# Patient Record
Sex: FEMALE | Race: WHITE | NOT HISPANIC OR LATINO | Employment: FULL TIME | URBAN - METROPOLITAN AREA
[De-identification: names, ages, dates, MRNs, and addresses within clinical notes are randomized per-mention and may not be internally consistent; named-entity substitution may affect disease eponyms.]

---

## 2023-02-10 ENCOUNTER — HOSPITAL ENCOUNTER (OUTPATIENT)
Dept: RADIOLOGY | Facility: CLINIC | Age: 63
Discharge: HOME | End: 2023-02-10
Attending: FAMILY MEDICINE
Payer: MEDICARE

## 2023-02-10 ENCOUNTER — OFFICE VISIT (OUTPATIENT)
Dept: FAMILY MEDICINE | Facility: CLINIC | Age: 63
End: 2023-02-10
Payer: MEDICARE

## 2023-02-10 VITALS
OXYGEN SATURATION: 97 % | HEART RATE: 92 BPM | HEIGHT: 66 IN | BODY MASS INDEX: 30.16 KG/M2 | WEIGHT: 187.63 LBS | SYSTOLIC BLOOD PRESSURE: 128 MMHG | DIASTOLIC BLOOD PRESSURE: 80 MMHG | TEMPERATURE: 98 F

## 2023-02-10 DIAGNOSIS — M79.89 MASS OF SOFT TISSUE: ICD-10-CM

## 2023-02-10 DIAGNOSIS — M62.838 MUSCLE SPASMS OF NECK: ICD-10-CM

## 2023-02-10 DIAGNOSIS — M79.89 MASS OF SOFT TISSUE: Primary | ICD-10-CM

## 2023-02-10 PROCEDURE — 99213 OFFICE O/P EST LOW 20 MIN: CPT | Mod: ,,, | Performed by: FAMILY MEDICINE

## 2023-02-10 PROCEDURE — 3074F SYST BP LT 130 MM HG: CPT | Mod: CPTII,,, | Performed by: FAMILY MEDICINE

## 2023-02-10 PROCEDURE — 3008F BODY MASS INDEX DOCD: CPT | Mod: CPTII,,, | Performed by: FAMILY MEDICINE

## 2023-02-10 PROCEDURE — 3008F PR BODY MASS INDEX (BMI) DOCUMENTED: ICD-10-PCS | Mod: CPTII,,, | Performed by: FAMILY MEDICINE

## 2023-02-10 PROCEDURE — 3079F DIAST BP 80-89 MM HG: CPT | Mod: CPTII,,, | Performed by: FAMILY MEDICINE

## 2023-02-10 PROCEDURE — 1159F MED LIST DOCD IN RCRD: CPT | Mod: CPTII,,, | Performed by: FAMILY MEDICINE

## 2023-02-10 PROCEDURE — 99213 PR OFFICE/OUTPT VISIT, EST, LEVL III, 20-29 MIN: ICD-10-PCS | Mod: ,,, | Performed by: FAMILY MEDICINE

## 2023-02-10 PROCEDURE — 3074F PR MOST RECENT SYSTOLIC BLOOD PRESSURE < 130 MM HG: ICD-10-PCS | Mod: CPTII,,, | Performed by: FAMILY MEDICINE

## 2023-02-10 PROCEDURE — 73140 XR FINGER 2 OR MORE VIEWS: ICD-10-PCS | Mod: 26,,, | Performed by: RADIOLOGY

## 2023-02-10 PROCEDURE — 73140 X-RAY EXAM OF FINGER(S): CPT | Mod: TC,LT,, | Performed by: FAMILY MEDICINE

## 2023-02-10 PROCEDURE — 73140 X-RAY EXAM OF FINGER(S): CPT | Mod: 26,,, | Performed by: RADIOLOGY

## 2023-02-10 PROCEDURE — 1159F PR MEDICATION LIST DOCUMENTED IN MEDICAL RECORD: ICD-10-PCS | Mod: CPTII,,, | Performed by: FAMILY MEDICINE

## 2023-02-10 PROCEDURE — 3079F PR MOST RECENT DIASTOLIC BLOOD PRESSURE 80-89 MM HG: ICD-10-PCS | Mod: CPTII,,, | Performed by: FAMILY MEDICINE

## 2023-02-10 PROCEDURE — 73140 XR FINGER 2 OR MORE VIEWS: ICD-10-PCS | Mod: TC,LT,, | Performed by: FAMILY MEDICINE

## 2023-02-10 RX ORDER — CYCLOBENZAPRINE HCL 10 MG
10 TABLET ORAL 3 TIMES DAILY PRN
Qty: 45 TABLET | Refills: 2 | Status: SHIPPED | OUTPATIENT
Start: 2023-02-10

## 2023-02-10 NOTE — PROGRESS NOTES
Subjective:       Patient ID: Regina Brown is a 63 y.o. female.    Chief Complaint: Establish Care (PT HERE TO EST CARE pt is fasting/C/o neck pain x years, would like to try flexeril)    Ms Brown is here today to establish care and be evaluated for a large cyst on her left index finger in between the proximal and the distal interphalangeal joint. She states it has been present since Ana, and has been growing ever since. She states that when shse first noticed it, it was like a little wart. She did go a dermatologist after it had been present for approx 5 years. She states that the dermatologist injected it with steroids and billed her for cauterizing it, although it was not cauterized per patient. It has continued to grow since that time.      Review of Systems   Constitutional: Negative.    HENT: Negative.     Eyes: Negative.    Respiratory: Negative.     Cardiovascular: Negative.    Gastrointestinal: Negative.    Endocrine: Negative.    Musculoskeletal:  Positive for arthralgias, back pain, myalgias, neck pain and neck stiffness.        Mass to left index finger, appears cystic      There is no problem list on file for this patient.      Objective:      Physical Exam  Constitutional:       Appearance: Normal appearance.   HENT:      Head: Normocephalic and atraumatic.      Mouth/Throat:      Mouth: Mucous membranes are moist.   Eyes:      Extraocular Movements: Extraocular movements intact.      Pupils: Pupils are equal, round, and reactive to light.   Abdominal:      Palpations: Abdomen is soft.   Musculoskeletal:      Cervical back: Tenderness present.      Comments: Left index finger with large cystic mass between the PIP and DIP joint   Skin:     General: Skin is warm.   Neurological:      General: No focal deficit present.      Mental Status: She is alert and oriented to person, place, and time.   Psychiatric:         Mood and Affect: Mood normal.         Thought Content: Thought content normal.        "  Judgment: Judgment normal.       Lab Results   Component Value Date     03/16/2014    K 4.2 03/16/2014     03/16/2014    CREATININE 0.8 03/16/2014    BUN 18 03/16/2014    CO2 24 03/16/2014     The ASCVD Risk score (Sima RAYMOND, et al., 2019) failed to calculate for the following reasons:    Cannot find a previous HDL lab    Cannot find a previous total cholesterol lab  Visit Vitals  /80 (BP Location: Left arm, Patient Position: Sitting, BP Method: Medium (Manual))   Pulse 92   Temp 98.4 °F (36.9 °C)   Ht 5' 6" (1.676 m)   Wt 85.1 kg (187 lb 9.8 oz)   SpO2 97%   BMI 30.28 kg/m²      Assessment:       1. Mass of soft tissue    2. Muscle spasms of neck        Plan:       1. Mass of soft tissue  -     Ambulatory referral/consult to Orthopedics; Future; Expected date: 02/17/2023  -     X-Ray Finger 2 or More Views; Future; Expected date: 02/10/2023    2. Muscle spasms of neck  -     cyclobenzaprine (FLEXERIL) 10 MG tablet; Take 1 tablet (10 mg total) by mouth 3 (three) times daily as needed for Muscle spasms.  Dispense: 45 tablet; Refill: 2       Follow up in about 3 months (around 5/10/2023), or if symptoms worsen or fail to improve.           "

## 2023-02-19 ENCOUNTER — PATIENT MESSAGE (OUTPATIENT)
Dept: ADMINISTRATIVE | Facility: HOSPITAL | Age: 63
End: 2023-02-19
Payer: MEDICARE

## 2023-02-19 DIAGNOSIS — Z12.11 SCREENING FOR COLON CANCER: ICD-10-CM

## 2023-02-27 DIAGNOSIS — M79.645 FINGER PAIN, LEFT: Primary | ICD-10-CM

## 2023-03-01 ENCOUNTER — OFFICE VISIT (OUTPATIENT)
Dept: ORTHOPEDICS | Facility: CLINIC | Age: 63
End: 2023-03-01
Payer: MEDICARE

## 2023-03-01 ENCOUNTER — HOSPITAL ENCOUNTER (OUTPATIENT)
Dept: RADIOLOGY | Facility: HOSPITAL | Age: 63
Discharge: HOME | End: 2023-03-01
Attending: PHYSICIAN ASSISTANT
Payer: MEDICARE

## 2023-03-01 DIAGNOSIS — M79.89 MASS OF SOFT TISSUE: ICD-10-CM

## 2023-03-01 DIAGNOSIS — M79.645 FINGER PAIN, LEFT: ICD-10-CM

## 2023-03-01 DIAGNOSIS — R22.32 MASS OF FINGER OF LEFT HAND: Primary | ICD-10-CM

## 2023-03-01 PROCEDURE — 73130 XR HAND COMPLETE 3 VIEW LEFT: ICD-10-PCS | Mod: 26,LT,, | Performed by: RADIOLOGY

## 2023-03-01 PROCEDURE — 99999 PR PBB SHADOW E&M-EST. PATIENT-LVL II: ICD-10-PCS | Mod: PBBFAC,,, | Performed by: PHYSICIAN ASSISTANT

## 2023-03-01 PROCEDURE — 99999 PR PBB SHADOW E&M-EST. PATIENT-LVL II: CPT | Mod: PBBFAC,,, | Performed by: PHYSICIAN ASSISTANT

## 2023-03-01 PROCEDURE — 99204 OFFICE O/P NEW MOD 45 MIN: CPT | Mod: S$GLB,,, | Performed by: PHYSICIAN ASSISTANT

## 2023-03-01 PROCEDURE — 73130 X-RAY EXAM OF HAND: CPT | Mod: TC,PN,LT

## 2023-03-01 PROCEDURE — 1159F PR MEDICATION LIST DOCUMENTED IN MEDICAL RECORD: ICD-10-PCS | Mod: CPTII,S$GLB,, | Performed by: PHYSICIAN ASSISTANT

## 2023-03-01 PROCEDURE — 1159F MED LIST DOCD IN RCRD: CPT | Mod: CPTII,S$GLB,, | Performed by: PHYSICIAN ASSISTANT

## 2023-03-01 PROCEDURE — 99204 PR OFFICE/OUTPT VISIT, NEW, LEVL IV, 45-59 MIN: ICD-10-PCS | Mod: S$GLB,,, | Performed by: PHYSICIAN ASSISTANT

## 2023-03-01 PROCEDURE — 73130 X-RAY EXAM OF HAND: CPT | Mod: 26,LT,, | Performed by: RADIOLOGY

## 2023-03-01 NOTE — PROGRESS NOTES
[facility]  History & Physical  Orthopedics    SUBJECTIVE:      Chief Complaint: Left index finger mass    Referring Provider: Colleen Licea Dr. is the supervising physician for this encounter/patient    History of Present Illness:  Patient is a 63 y.o. right hand dominant female who presents today with complaints of left index finger mass, present since 2005. She says it started as what looked like a small wart to the underside of the DIP, this then increased in size over a year and stayed the same. She has noticed some increased size recently. This causes no pain and she has full motion, however she would like it removed.     Onset of symptoms/DOI was 2005.    Symptoms are aggravated by  none .    Symptoms are alleviated by  none .    Symptoms consist of  mass .    The patient rates their pain as a 0/10.    Attempted treatment(s) and/or interventions include activity modifications, rest.     The patient denies any fevers, chills, N/V, D/C and presents for evaluation.       Past Medical History:   Diagnosis Date    HTN (hypertension)      Past Surgical History:   Procedure Laterality Date    APPENDECTOMY      ELBOW SURGERY Right 1998     Review of patient's allergies indicates:   Allergen Reactions    Codeine     Opioids-meperidine and related Rash     Social History     Social History Narrative    Not on file     No family history on file.      Current Outpatient Medications:     cyclobenzaprine (FLEXERIL) 10 MG tablet, Take 1 tablet (10 mg total) by mouth 3 (three) times daily as needed for Muscle spasms., Disp: 45 tablet, Rfl: 2      Review of Systems:  Constitutional: no fever or chills  Eyes: no visual changes  ENT: no nasal congestion or sore throat  Respiratory: no cough or shortness of breath  Cardiovascular: no chest pain  Gastrointestinal: no nausea or vomiting, tolerating diet  Musculoskeletal:  mass    OBJECTIVE:      Vital Signs (Most Recent):  There were no vitals  filed for this visit.  There is no height or weight on file to calculate BMI.      Physical Exam:  Constitutional: The patient appears well-developed and well-nourished. No distress.   Skin: No lesions appreciated  Head: Normocephalic and atraumatic.   Nose: Nose normal.   Ears: No deformities seen  Eyes: Conjunctivae and EOM are normal.   Neck: No tracheal deviation present.   Cardiovascular: Normal rate and intact distal pulses.    Pulmonary/Chest: Effort normal. No respiratory distress.   Abdominal: There is no guarding.   Neurological: The patient is alert.   Psychiatric: The patient has a normal mood and affect.     Left Hand/Wrist Examination:    Observation/Inspection:  Swelling  none    Deformity  Large mass noted to the volar surface of the index middle phalanx, this does appear to wrap around to the radial boarder as well.  Discoloration  none     Scars   none    Atrophy  none    HAND/WRIST EXAMINATION:  Finkelstein's Test   Neg  WHAT Test    Neg  Snuff box tenderness   Neg  Veloz's Test    Neg  Hook of Hamate Tenderness  Neg  CMC grind    Neg  Circumduction test   Neg  The mass is soft and NTTP    Neurovascular Exam:  Digits WWP, brisk CR < 3s throughout  NVI motor/LTS to M/R/U nerves, radial pulse 2+  Tinel's Test - Carpal Tunnel  Neg  Tinel's Test - Cubital Tunnel  Neg  Phalen's Test    Neg  Median Nerve Compression Test Neg    ROM hand full, painless    ROM wrist full, painless    ROM elbow full, painless    Abdomen not guarded  Respirations nonlabored  Perfusion intact    Diagnostic Results:     Imaging - I independently viewed the patient's imaging as well as the radiology report.      FINDINGS:  No acute fracture.  Slight ulnar minus variance.  Mild degenerative change thumb MCP joint and several finger IP joints.  Along the volar aspect of the index digit there is a rounded mass which is 2.1 x 1.4 cm slightly hyperdense to the adjacent soft tissues.  This is centered at the skin surface and there  is no obvious involvement of the subjacent index middle phalanx.     Impression:     Indeterminate soft tissue mass along the 2nd digit.  No clear bone involvement.  Ultrasound or contrasted MRI may add further characterization if indicated.      ASSESSMENT/PLAN:      63 y.o. yo female with Left index finger mass    Plan: The patient and I had a thorough discussion today.  We discussed the working diagnosis as well as several other potential alternative diagnoses.  Treatment options were discussed, both conservative and surgical.  Conservative treatment options would include things such as activity modifications, workplace modifications, a period of rest, oral vs topical OTC and prescription anti-inflammatory medications, occupational therapy, splinting/bracing, immobilization, corticosteroid injections, and others.  Surgical options were discussed as well.     At this time, the patient would like to proceed with a trial of MRI ordered for further evaluation and surgical planning, I will have her see Dr. Richards after to discuss and schedule surgery.    Should the patient's symptoms worsen, persist, or fail to improve they should return for reevaluation and I would be happy to see them back anytime.           Please do not hesitate to reach out to us via email, phone, or MyChart with any questions, concerns, or feedback.

## 2023-03-20 ENCOUNTER — TELEPHONE (OUTPATIENT)
Dept: ORTHOPEDICS | Facility: CLINIC | Age: 63
End: 2023-03-20
Payer: MEDICARE

## 2023-03-20 NOTE — TELEPHONE ENCOUNTER
Called and spoke with the pt. Pt wants to schedule her MRI as she got clearance from her insurance. Informed pt the first availability is on 04/09/23 at 8.30 am at [facility] which is across the street to the [facility] in [place],pt confirmed the available appt. I informed her that she needs to reschedule her f/u appt with  for MRI review. Informed pt the next availability is on 04/20/23 at 9.00 am at [facility].pt confirmed the available appt and I confirmed her appt. Pt verbalized understanding and was thankful.

## 2023-04-09 ENCOUNTER — HOSPITAL ENCOUNTER (OUTPATIENT)
Dept: RADIOLOGY | Facility: HOSPITAL | Age: 63
Discharge: HOME | End: 2023-04-09
Attending: PHYSICIAN ASSISTANT
Payer: MEDICARE

## 2023-04-09 DIAGNOSIS — R22.32 MASS OF FINGER OF LEFT HAND: ICD-10-CM

## 2023-04-09 PROCEDURE — 73220 MRI UPPR EXTREMITY W/O&W/DYE: CPT | Mod: 26,LT,, | Performed by: RADIOLOGY

## 2023-04-09 PROCEDURE — A9585 GADOBUTROL INJECTION: HCPCS | Performed by: PHYSICIAN ASSISTANT

## 2023-04-09 PROCEDURE — 73220 MRI UPPR EXTREMITY W/O&W/DYE: CPT | Mod: TC,LT

## 2023-04-09 PROCEDURE — 25500020 PHARM REV CODE 255: Performed by: PHYSICIAN ASSISTANT

## 2023-04-09 PROCEDURE — 73220 MRI HAND FINGERS W WO CONTRAST LEFT: ICD-10-PCS | Mod: 26,LT,, | Performed by: RADIOLOGY

## 2023-04-09 RX ORDER — GADOBUTROL 604.72 MG/ML
9 INJECTION INTRAVENOUS
Status: COMPLETED | OUTPATIENT
Start: 2023-04-09 | End: 2023-04-09

## 2023-04-09 RX ADMIN — GADOBUTROL 9 ML: 604.72 INJECTION INTRAVENOUS at 08:04

## 2023-04-11 ENCOUNTER — PATIENT MESSAGE (OUTPATIENT)
Dept: ADMINISTRATIVE | Facility: HOSPITAL | Age: 63
End: 2023-04-11
Payer: MEDICARE

## 2023-04-20 ENCOUNTER — OFFICE VISIT (OUTPATIENT)
Dept: ORTHOPEDICS | Facility: CLINIC | Age: 63
End: 2023-04-20
Payer: MEDICARE

## 2023-04-20 VITALS — HEIGHT: 66 IN | WEIGHT: 187 LBS | BODY MASS INDEX: 30.05 KG/M2

## 2023-04-20 DIAGNOSIS — R22.32 FINGER MASS, LEFT: ICD-10-CM

## 2023-04-20 DIAGNOSIS — R22.32 MASS OF FINGER OF LEFT HAND: ICD-10-CM

## 2023-04-20 DIAGNOSIS — M79.89 MASS OF SOFT TISSUE: Primary | ICD-10-CM

## 2023-04-20 PROCEDURE — 3008F BODY MASS INDEX DOCD: CPT | Mod: CPTII,S$GLB,, | Performed by: ORTHOPAEDIC SURGERY

## 2023-04-20 PROCEDURE — 99999 PR PBB SHADOW E&M-EST. PATIENT-LVL III: ICD-10-PCS | Mod: PBBFAC,,, | Performed by: ORTHOPAEDIC SURGERY

## 2023-04-20 PROCEDURE — 1159F PR MEDICATION LIST DOCUMENTED IN MEDICAL RECORD: ICD-10-PCS | Mod: CPTII,S$GLB,, | Performed by: ORTHOPAEDIC SURGERY

## 2023-04-20 PROCEDURE — 3008F PR BODY MASS INDEX (BMI) DOCUMENTED: ICD-10-PCS | Mod: CPTII,S$GLB,, | Performed by: ORTHOPAEDIC SURGERY

## 2023-04-20 PROCEDURE — 1159F MED LIST DOCD IN RCRD: CPT | Mod: CPTII,S$GLB,, | Performed by: ORTHOPAEDIC SURGERY

## 2023-04-20 PROCEDURE — 99214 OFFICE O/P EST MOD 30 MIN: CPT | Mod: S$GLB,,, | Performed by: ORTHOPAEDIC SURGERY

## 2023-04-20 PROCEDURE — 99999 PR PBB SHADOW E&M-EST. PATIENT-LVL III: CPT | Mod: PBBFAC,,, | Performed by: ORTHOPAEDIC SURGERY

## 2023-04-20 PROCEDURE — 99214 PR OFFICE/OUTPT VISIT, EST, LEVL IV, 30-39 MIN: ICD-10-PCS | Mod: S$GLB,,, | Performed by: ORTHOPAEDIC SURGERY

## 2023-04-20 RX ORDER — MUPIROCIN 20 MG/G
OINTMENT TOPICAL
Status: CANCELLED | OUTPATIENT
Start: 2023-04-20

## 2023-04-20 NOTE — H&P
[facility]  History & Physical  Orthopedics     SUBJECTIVE:       Chief Complaint: Left index finger mass     Referring Provider: Russo-Digeorge, Jamie L* Dr. Dunbar is the supervising physician for this encounter/patient     History of Present Illness:  Patient is a 63 y.o. right hand dominant female who presents today with complaints of left index finger mass, present since 2005. She says it started as what looked like a small wart to the underside of the DIP, this then increased in size over a year and stayed the same. She has noticed some increased size recently. This causes no pain and she has full motion, however she would like it removed.      Interval history April 20, 2023: The patient returns today for re-evaluation.  She notes that she has a longstanding history of left index finger mass.  She notes that this was initially needled by her dermatology provider to cauterize it and that she experience some growth subsequently thereafter.  She now notes that this mass has continued enlarging.  She had a recent MRI and returns for these results and re-evaluation.  No other complaints today.     Onset of symptoms/DOI was 2005.     Symptoms are aggravated by  none .     Symptoms are alleviated by  none .     Symptoms consist of  mass .     The patient rates their pain as a 0/10.     Attempted treatment(s) and/or interventions include activity modifications, rest.     The patient denies any fevers, chills, N/V, D/C and presents for evaluation.             Past Medical History:   Diagnosis Date    HTN (hypertension)              Past Surgical History:   Procedure Laterality Date    APPENDECTOMY        ELBOW SURGERY Right 1998           Review of patient's allergies indicates:   Allergen Reactions    Codeine      Opioids-meperidine and related Rash      Social History          Social History Narrative    Not on file      No family history on file.        Current Outpatient Medications:      "cyclobenzaprine (FLEXERIL) 10 MG tablet, Take 1 tablet (10 mg total) by mouth 3 (three) times daily as needed for Muscle spasms., Disp: 45 tablet, Rfl: 2        Review of Systems:  Constitutional: no fever or chills  Eyes: no visual changes  ENT: no nasal congestion or sore throat  Respiratory: no cough or shortness of breath  Cardiovascular: no chest pain  Gastrointestinal: no nausea or vomiting, tolerating diet  Musculoskeletal:  mass     OBJECTIVE:       Vital Signs (Most Recent):  Vitals       Vitals:     04/20/23 0848   Weight: 84.8 kg (187 lb)   Height: 5' 6" (1.676 m)         Body mass index is 30.18 kg/m².        Physical Exam:  Constitutional: The patient appears well-developed and well-nourished. No distress.   Skin: No lesions appreciated  Head: Normocephalic and atraumatic.   Nose: Nose normal.   Ears: No deformities seen  Eyes: Conjunctivae and EOM are normal.   Neck: No tracheal deviation present.   Cardiovascular: Normal rate and intact distal pulses.    Pulmonary/Chest: Effort normal. No respiratory distress.   Abdominal: There is no guarding.   Neurological: The patient is alert.   Psychiatric: The patient has a normal mood and affect.      Left Hand/Wrist Examination:     Observation/Inspection:  Swelling                       none                  Deformity                     Large mass noted to the volar surface of the index middle phalanx, this does appear to wrap around to the radial boarder as well.  Discoloration               none                  Scars                           none                  Atrophy                        none     HAND/WRIST EXAMINATION:  Finkelstein's Test                                Neg  WHAT Test                                         Neg  Snuff box tenderness                          Neg  Veloz's Test                                     Neg  Hook of Hamate Tenderness              Neg  CMC grind                                           Neg  Circumduction " test                              Neg  The mass is soft and NTTP     Neurovascular Exam:  Digits WWP, brisk CR < 3s throughout  NVI motor/LTS to M/R/U nerves, radial pulse 2+  Tinel's Test - Carpal Tunnel                Neg  Tinel's Test - Cubital Tunnel               Neg  Phalen's Test                                      Neg  Median Nerve Compression Test       Neg     ROM hand full, painless     ROM wrist full, painless    ROM elbow full, painless     Abdomen not guarded  Respirations nonlabored  Perfusion intact     Diagnostic Results:     Imaging - I independently viewed the patient's imaging as well as the radiology report.       FINDINGS:  No acute fracture.  Slight ulnar minus variance.  Mild degenerative change thumb MCP joint and several finger IP joints.  Along the volar aspect of the index digit there is a rounded mass which is 2.1 x 1.4 cm slightly hyperdense to the adjacent soft tissues.  This is centered at the skin surface and there is no obvious involvement of the subjacent index middle phalanx.     MRI left index finger-Impression:     1. 1.7 x 1.9 x 1.5 cm well-circumscribed lesion within the subcutaneous soft tissues along the volar and ulnar aspects of the 2nd middle phalanx/DIP joint that demonstrates MR imaging characteristics favoring a mucoid cyst.  No associated osseous remodeling.  Lesion abuts the adjacent 2nd flexor tendon without definite intratendinous extension.     Impression:     Indeterminate soft tissue mass along the 2nd digit.  No clear bone involvement.  Ultrasound or contrasted MRI may add further characterization if indicated.        ASSESSMENT/PLAN:       63 y.o. yo female with Left index finger mass     Plan: The patient and I had a thorough discussion today.  We discussed the working diagnosis as well as several other potential alternative diagnoses.  Treatment options were discussed, both conservative and surgical.  Conservative treatment options would include things such  as activity modifications, workplace modifications, a period of rest, oral vs topical OTC and prescription anti-inflammatory medications, occupational therapy, splinting/bracing, immobilization, corticosteroid injections, and others.  Surgical options were discussed as well.      At this time, the patient would like to proceed with excisional biopsy of her large left index finger mass.  We discussed the significant risk of neurovascular injury given the size of this mass and its location.  She would like to proceed with this on May 25, 2023 which I feel is reasonable.       The patient has not responded to adequate non operative treatment at this time and/or non operative treatment is not indicated. Thus, the risks, benefits and alternatives to surgery were discussed with the patient in detail.  Specific risks include but are not limited to bleeding, infection, vessel and/or nerve damage, pain, numbness, tingling, complex regional pain syndrome, compartment syndrome, failure to return to pre-injury and/or preoperative functional status, scar sensitivity, delayed healing, inability to return to work, pulley injury, tendon injury, bowstringing, partial and/or incomplete relief of symptoms, weakness, persistence of and/or worsening of symptoms, surgical failure, osteomyelitis, amputation, loss of function, stiffness, functional debility, dysfunction, decreased  strength, need for prolonged postoperative rehabilitation, need for further surgery, deep venous thrombosis, pulmonary embolism, arthritis and death.  The patient states an understanding and wishes to proceed with surgery.   All questions were answered.  No guarantees were implied or stated.  Written informed consent was obtained.        Please do not hesitate to reach out to us via email, phone, or [employer] with any questions, concerns, or feedback.

## 2023-04-20 NOTE — PROGRESS NOTES
[facility]  History & Physical  Orthopedics    SUBJECTIVE:      Chief Complaint: Left index finger mass    Referring Provider: Russo-Digeorge, Jamie L* Dr. Dunbar is the supervising physician for this encounter/patient    History of Present Illness:  Patient is a 63 y.o. right hand dominant female who presents today with complaints of left index finger mass, present since 2005. She says it started as what looked like a small wart to the underside of the DIP, this then increased in size over a year and stayed the same. She has noticed some increased size recently. This causes no pain and she has full motion, however she would like it removed.     Interval history April 20, 2023: The patient returns today for re-evaluation.  She notes that she has a longstanding history of left index finger mass.  She notes that this was initially needled by her dermatology provider to cauterize it and that she experience some growth subsequently thereafter.  She now notes that this mass has continued enlarging.  She had a recent MRI and returns for these results and re-evaluation.  No other complaints today.    Onset of symptoms/DOI was 2005.    Symptoms are aggravated by  none .    Symptoms are alleviated by  none .    Symptoms consist of  mass .    The patient rates their pain as a 0/10.    Attempted treatment(s) and/or interventions include activity modifications, rest.     The patient denies any fevers, chills, N/V, D/C and presents for evaluation.       Past Medical History:   Diagnosis Date    HTN (hypertension)      Past Surgical History:   Procedure Laterality Date    APPENDECTOMY      ELBOW SURGERY Right 1998     Review of patient's allergies indicates:   Allergen Reactions    Codeine     Opioids-meperidine and related Rash     Social History     Social History Narrative    Not on file     No family history on file.      Current Outpatient Medications:     cyclobenzaprine (FLEXERIL) 10 MG tablet,  "Take 1 tablet (10 mg total) by mouth 3 (three) times daily as needed for Muscle spasms., Disp: 45 tablet, Rfl: 2      Review of Systems:  Constitutional: no fever or chills  Eyes: no visual changes  ENT: no nasal congestion or sore throat  Respiratory: no cough or shortness of breath  Cardiovascular: no chest pain  Gastrointestinal: no nausea or vomiting, tolerating diet  Musculoskeletal:  mass    OBJECTIVE:      Vital Signs (Most Recent):  Vitals:    04/20/23 0848   Weight: 84.8 kg (187 lb)   Height: 5' 6" (1.676 m)     Body mass index is 30.18 kg/m².      Physical Exam:  Constitutional: The patient appears well-developed and well-nourished. No distress.   Skin: No lesions appreciated  Head: Normocephalic and atraumatic.   Nose: Nose normal.   Ears: No deformities seen  Eyes: Conjunctivae and EOM are normal.   Neck: No tracheal deviation present.   Cardiovascular: Normal rate and intact distal pulses.    Pulmonary/Chest: Effort normal. No respiratory distress.   Abdominal: There is no guarding.   Neurological: The patient is alert.   Psychiatric: The patient has a normal mood and affect.     Left Hand/Wrist Examination:    Observation/Inspection:  Swelling  none    Deformity  Large mass noted to the volar surface of the index middle phalanx, this does appear to wrap around to the radial boarder as well.  Discoloration  none     Scars   none    Atrophy  none    HAND/WRIST EXAMINATION:  Finkelstein's Test   Neg  WHAT Test    Neg  Snuff box tenderness   Neg  Veloz's Test    Neg  Hook of Hamate Tenderness  Neg  CMC grind    Neg  Circumduction test   Neg  The mass is soft and NTTP    Neurovascular Exam:  Digits WWP, brisk CR < 3s throughout  NVI motor/LTS to M/R/U nerves, radial pulse 2+  Tinel's Test - Carpal Tunnel  Neg  Tinel's Test - Cubital Tunnel  Neg  Phalen's Test    Neg  Median Nerve Compression Test Neg    ROM hand full, painless    ROM wrist full, painless    ROM elbow full, painless    Abdomen not " guarded  Respirations nonlabored  Perfusion intact    Diagnostic Results:     Imaging - I independently viewed the patient's imaging as well as the radiology report.      FINDINGS:  No acute fracture.  Slight ulnar minus variance.  Mild degenerative change thumb MCP joint and several finger IP joints.  Along the volar aspect of the index digit there is a rounded mass which is 2.1 x 1.4 cm slightly hyperdense to the adjacent soft tissues.  This is centered at the skin surface and there is no obvious involvement of the subjacent index middle phalanx.     MRI left index finger-Impression:     1. 1.7 x 1.9 x 1.5 cm well-circumscribed lesion within the subcutaneous soft tissues along the volar and ulnar aspects of the 2nd middle phalanx/DIP joint that demonstrates MR imaging characteristics favoring a mucoid cyst.  No associated osseous remodeling.  Lesion abuts the adjacent 2nd flexor tendon without definite intratendinous extension.    Impression:     Indeterminate soft tissue mass along the 2nd digit.  No clear bone involvement.  Ultrasound or contrasted MRI may add further characterization if indicated.      ASSESSMENT/PLAN:      63 y.o. yo female with Left index finger mass    Plan: The patient and I had a thorough discussion today.  We discussed the working diagnosis as well as several other potential alternative diagnoses.  Treatment options were discussed, both conservative and surgical.  Conservative treatment options would include things such as activity modifications, workplace modifications, a period of rest, oral vs topical OTC and prescription anti-inflammatory medications, occupational therapy, splinting/bracing, immobilization, corticosteroid injections, and others.  Surgical options were discussed as well.     At this time, the patient would like to proceed with excisional biopsy of her large left index finger mass.  We discussed the significant risk of neurovascular injury given the size of this mass  and its location.  She would like to proceed with this on May 25, 2023 which I feel is reasonable.      The patient has not responded to adequate non operative treatment at this time and/or non operative treatment is not indicated. Thus, the risks, benefits and alternatives to surgery were discussed with the patient in detail.  Specific risks include but are not limited to bleeding, infection, vessel and/or nerve damage, pain, numbness, tingling, complex regional pain syndrome, compartment syndrome, failure to return to pre-injury and/or preoperative functional status, scar sensitivity, delayed healing, inability to return to work, pulley injury, tendon injury, bowstringing, partial and/or incomplete relief of symptoms, weakness, persistence of and/or worsening of symptoms, surgical failure, osteomyelitis, amputation, loss of function, stiffness, functional debility, dysfunction, decreased  strength, need for prolonged postoperative rehabilitation, need for further surgery, deep venous thrombosis, pulmonary embolism, arthritis and death.  The patient states an understanding and wishes to proceed with surgery.   All questions were answered.  No guarantees were implied or stated.  Written informed consent was obtained.      Please do not hesitate to reach out to us via email, phone, or [email or web address] with any questions, concerns, or feedback.

## 2023-05-09 ENCOUNTER — OFFICE VISIT (OUTPATIENT)
Dept: FAMILY MEDICINE | Facility: CLINIC | Age: 63
End: 2023-05-09
Payer: MEDICARE

## 2023-05-09 ENCOUNTER — LAB VISIT (OUTPATIENT)
Dept: LAB | Facility: CLINIC | Age: 63
End: 2023-05-09
Payer: MEDICARE

## 2023-05-09 ENCOUNTER — HOSPITAL ENCOUNTER (OUTPATIENT)
Dept: RADIOLOGY | Facility: CLINIC | Age: 63
Discharge: HOME | End: 2023-05-09
Attending: FAMILY MEDICINE
Payer: MEDICARE

## 2023-05-09 ENCOUNTER — PATIENT MESSAGE (OUTPATIENT)
Dept: FAMILY MEDICINE | Facility: CLINIC | Age: 63
End: 2023-05-09

## 2023-05-09 VITALS
BODY MASS INDEX: 29.48 KG/M2 | DIASTOLIC BLOOD PRESSURE: 80 MMHG | OXYGEN SATURATION: 95 % | TEMPERATURE: 98 F | HEART RATE: 80 BPM | WEIGHT: 183.44 LBS | SYSTOLIC BLOOD PRESSURE: 124 MMHG | HEIGHT: 66 IN

## 2023-05-09 DIAGNOSIS — Z13.1 DIABETES MELLITUS SCREENING: ICD-10-CM

## 2023-05-09 DIAGNOSIS — R01.1 HEART MURMUR: ICD-10-CM

## 2023-05-09 DIAGNOSIS — Z11.59 ENCOUNTER FOR HEPATITIS C SCREENING TEST FOR LOW RISK PATIENT: ICD-10-CM

## 2023-05-09 DIAGNOSIS — Z13.29 THYROID DISORDER SCREEN: ICD-10-CM

## 2023-05-09 DIAGNOSIS — Z12.31 BREAST CANCER SCREENING BY MAMMOGRAM: ICD-10-CM

## 2023-05-09 DIAGNOSIS — Z71.6 ENCOUNTER FOR TOBACCO USE CESSATION COUNSELING: ICD-10-CM

## 2023-05-09 DIAGNOSIS — Z00.00 ROUTINE MEDICAL EXAM: ICD-10-CM

## 2023-05-09 DIAGNOSIS — Z13.220 LIPID SCREENING: Primary | ICD-10-CM

## 2023-05-09 DIAGNOSIS — Z13.220 LIPID SCREENING: ICD-10-CM

## 2023-05-09 DIAGNOSIS — E78.00 HYPERCHOLESTEREMIA: Primary | ICD-10-CM

## 2023-05-09 LAB
ALBUMIN SERPL BCP-MCNC: 4.4 G/DL (ref 3.5–5.2)
ALBUMIN/CREAT UR: NORMAL UG/MG (ref 0–30)
ALP SERPL-CCNC: 70 U/L (ref 55–135)
ALT SERPL W/O P-5'-P-CCNC: 19 U/L (ref 10–44)
ANION GAP SERPL CALC-SCNC: 13 MMOL/L (ref 8–16)
AST SERPL-CCNC: 17 U/L (ref 10–40)
BASOPHILS # BLD AUTO: 0.05 K/UL (ref 0–0.2)
BASOPHILS NFR BLD: 1 % (ref 0–1.9)
BILIRUB SERPL-MCNC: 0.7 MG/DL (ref 0.1–1)
BUN SERPL-MCNC: 9 MG/DL (ref 8–23)
CALCIUM SERPL-MCNC: 10 MG/DL (ref 8.7–10.5)
CHLORIDE SERPL-SCNC: 108 MMOL/L (ref 95–110)
CHOLEST SERPL-MCNC: 270 MG/DL (ref 120–199)
CHOLEST/HDLC SERPL: 5.5 {RATIO} (ref 2–5)
CO2 SERPL-SCNC: 22 MMOL/L (ref 23–29)
CREAT SERPL-MCNC: 0.8 MG/DL (ref 0.5–1.4)
CREAT UR-MCNC: 33 MG/DL (ref 15–325)
DIFFERENTIAL METHOD: ABNORMAL
EOSINOPHIL # BLD AUTO: 0.1 K/UL (ref 0–0.5)
EOSINOPHIL NFR BLD: 1.2 % (ref 0–8)
ERYTHROCYTE [DISTWIDTH] IN BLOOD BY AUTOMATED COUNT: 12.3 % (ref 11.5–14.5)
EST. GFR  (NO RACE VARIABLE): >60 ML/MIN/1.73 M^2
ESTIMATED AVG GLUCOSE: 97 MG/DL (ref 68–131)
GLUCOSE SERPL-MCNC: 92 MG/DL (ref 70–110)
HBA1C MFR BLD: 5 % (ref 4–5.6)
HCT VFR BLD AUTO: 47.2 % (ref 37–48.5)
HCV AB SERPL QL IA: NORMAL
HDLC SERPL-MCNC: 49 MG/DL (ref 40–75)
HDLC SERPL: 18.1 % (ref 20–50)
HGB BLD-MCNC: 15.2 G/DL (ref 12–16)
IMM GRANULOCYTES # BLD AUTO: 0.01 K/UL (ref 0–0.04)
IMM GRANULOCYTES NFR BLD AUTO: 0.2 % (ref 0–0.5)
LDLC SERPL CALC-MCNC: 192.4 MG/DL (ref 63–159)
LYMPHOCYTES # BLD AUTO: 1.6 K/UL (ref 1–4.8)
LYMPHOCYTES NFR BLD: 30.4 % (ref 18–48)
MCH RBC QN AUTO: 31.1 PG (ref 27–31)
MCHC RBC AUTO-ENTMCNC: 32.2 G/DL (ref 32–36)
MCV RBC AUTO: 97 FL (ref 82–98)
MICROALBUMIN UR DL<=1MG/L-MCNC: <5 UG/ML
MONOCYTES # BLD AUTO: 0.4 K/UL (ref 0.3–1)
MONOCYTES NFR BLD: 6.8 % (ref 4–15)
NEUTROPHILS # BLD AUTO: 3.1 K/UL (ref 1.8–7.7)
NEUTROPHILS NFR BLD: 60.4 % (ref 38–73)
NONHDLC SERPL-MCNC: 221 MG/DL
NRBC BLD-RTO: 0 /100 WBC
PLATELET # BLD AUTO: 196 K/UL (ref 150–450)
PMV BLD AUTO: 11 FL (ref 9.2–12.9)
POTASSIUM SERPL-SCNC: 4.4 MMOL/L (ref 3.5–5.1)
PROT SERPL-MCNC: 7.9 G/DL (ref 6–8.4)
RBC # BLD AUTO: 4.89 M/UL (ref 4–5.4)
SODIUM SERPL-SCNC: 143 MMOL/L (ref 136–145)
TRIGL SERPL-MCNC: 143 MG/DL (ref 30–150)
TSH SERPL DL<=0.005 MIU/L-ACNC: 1.68 UIU/ML (ref 0.4–4)
WBC # BLD AUTO: 5.17 K/UL (ref 3.9–12.7)

## 2023-05-09 PROCEDURE — 3079F PR MOST RECENT DIASTOLIC BLOOD PRESSURE 80-89 MM HG: ICD-10-PCS | Mod: CPTII,,, | Performed by: FAMILY MEDICINE

## 2023-05-09 PROCEDURE — 99214 OFFICE O/P EST MOD 30 MIN: CPT | Mod: ,,, | Performed by: FAMILY MEDICINE

## 2023-05-09 PROCEDURE — 3074F PR MOST RECENT SYSTOLIC BLOOD PRESSURE < 130 MM HG: ICD-10-PCS | Mod: CPTII,,, | Performed by: FAMILY MEDICINE

## 2023-05-09 PROCEDURE — 3079F DIAST BP 80-89 MM HG: CPT | Mod: CPTII,,, | Performed by: FAMILY MEDICINE

## 2023-05-09 PROCEDURE — 71046 X-RAY EXAM CHEST 2 VIEWS: CPT | Mod: TC,,, | Performed by: FAMILY MEDICINE

## 2023-05-09 PROCEDURE — 1159F PR MEDICATION LIST DOCUMENTED IN MEDICAL RECORD: ICD-10-PCS | Mod: CPTII,,, | Performed by: FAMILY MEDICINE

## 2023-05-09 PROCEDURE — 3008F PR BODY MASS INDEX (BMI) DOCUMENTED: ICD-10-PCS | Mod: CPTII,,, | Performed by: FAMILY MEDICINE

## 2023-05-09 PROCEDURE — 71046 X-RAY EXAM CHEST 2 VIEWS: CPT | Mod: 26,,, | Performed by: RADIOLOGY

## 2023-05-09 PROCEDURE — 99214 PR OFFICE/OUTPT VISIT, EST, LEVL IV, 30-39 MIN: ICD-10-PCS | Mod: ,,, | Performed by: FAMILY MEDICINE

## 2023-05-09 PROCEDURE — 85025 COMPLETE CBC W/AUTO DIFF WBC: CPT | Performed by: FAMILY MEDICINE

## 2023-05-09 PROCEDURE — 83036 HEMOGLOBIN GLYCOSYLATED A1C: CPT | Performed by: FAMILY MEDICINE

## 2023-05-09 PROCEDURE — 71046 XR CHEST PA AND LATERAL: ICD-10-PCS | Mod: 26,,, | Performed by: RADIOLOGY

## 2023-05-09 PROCEDURE — 80053 COMPREHEN METABOLIC PANEL: CPT | Performed by: FAMILY MEDICINE

## 2023-05-09 PROCEDURE — 82570 ASSAY OF URINE CREATININE: CPT | Performed by: FAMILY MEDICINE

## 2023-05-09 PROCEDURE — 3008F BODY MASS INDEX DOCD: CPT | Mod: CPTII,,, | Performed by: FAMILY MEDICINE

## 2023-05-09 PROCEDURE — 84443 ASSAY THYROID STIM HORMONE: CPT | Performed by: FAMILY MEDICINE

## 2023-05-09 PROCEDURE — 71046 XR CHEST PA AND LATERAL: ICD-10-PCS | Mod: TC,,, | Performed by: FAMILY MEDICINE

## 2023-05-09 PROCEDURE — 3074F SYST BP LT 130 MM HG: CPT | Mod: CPTII,,, | Performed by: FAMILY MEDICINE

## 2023-05-09 PROCEDURE — 80061 LIPID PANEL: CPT | Performed by: FAMILY MEDICINE

## 2023-05-09 PROCEDURE — 86803 HEPATITIS C AB TEST: CPT | Performed by: FAMILY MEDICINE

## 2023-05-09 PROCEDURE — 1159F MED LIST DOCD IN RCRD: CPT | Mod: CPTII,,, | Performed by: FAMILY MEDICINE

## 2023-05-09 RX ORDER — ROSUVASTATIN CALCIUM 20 MG/1
20 TABLET, COATED ORAL DAILY
Qty: 90 TABLET | Refills: 3 | Status: SHIPPED | OUTPATIENT
Start: 2023-05-09 | End: 2023-08-07 | Stop reason: SDUPTHER

## 2023-05-09 NOTE — PROGRESS NOTES
"Subjective:       Patient ID: Regina Brown is a 63 y.o. female.    Chief Complaint: Follow-up (Pt here for 3 month ck up/Pt is fasting)    Ms Brown is here is here to do a routine follow up. She has not had routine labs in the past 30+ years. She wants a disability parking application filled out for her ortho issues. She walks with a cane and suffers form OA in her hips. She is on disability for this. She also has a mass in the left index finger that will be excised on May 25th, 2023. She is behind all all of her routine care, as she states , "I do not like to be poked and prodded." She is worried about weight gain, as she has gained approx 50# in 5 years. However, she has gone into menopause, has issues with ambiulation, and gets little exercise. We will do routine labs and re-evaluate.    Follow-up  Associated symptoms include arthralgias, fatigue and myalgias. Pertinent negatives include no chest pain, coughing or headaches.   Review of Systems   Constitutional:  Positive for fatigue.   HENT:  Positive for postnasal drip and sneezing.    Respiratory:  Negative for cough and shortness of breath.    Cardiovascular:  Negative for chest pain and palpitations.   Gastrointestinal: Negative.    Genitourinary:  Positive for frequency.   Musculoskeletal:  Positive for arthralgias, back pain, gait problem and myalgias.   Allergic/Immunologic: Positive for environmental allergies.   Neurological:  Negative for dizziness and headaches.   Psychiatric/Behavioral:  Positive for sleep disturbance. The patient is nervous/anxious.      There is no problem list on file for this patient.      Objective:      Physical Exam  Constitutional:       Appearance: Normal appearance. She is obese.   HENT:      Head: Normocephalic and atraumatic.      Mouth/Throat:      Mouth: Mucous membranes are moist.   Eyes:      Pupils: Pupils are equal, round, and reactive to light.   Neck:      Vascular: Carotid bruit present.      Comments: Loud " "right carotid bruit, mild left carotid bruit  Cardiovascular:      Rate and Rhythm: Normal rate and regular rhythm.      Heart sounds: Murmur heard.      Comments: Systolic ejection mumur over the aortic valve..No Patient Care Coordination Note on file.   Refuses workup, saying that she's had it since birth  Pulmonary:      Effort: Pulmonary effort is normal.      Breath sounds: Normal breath sounds.   Musculoskeletal:      Comments: Walks with a limp, uses a cane   Skin:     General: Skin is warm and dry.   Neurological:      Mental Status: She is alert and oriented to person, place, and time. Mental status is at baseline.   Psychiatric:         Mood and Affect: Mood normal.         Behavior: Behavior normal.         Thought Content: Thought content normal.         Judgment: Judgment normal.       Lab Results   Component Value Date     03/16/2014    K 4.2 03/16/2014     03/16/2014    CREATININE 0.8 03/16/2014    BUN 18 03/16/2014    CO2 24 03/16/2014     The ASCVD Risk score (Sima RAYMOND, et al., 2019) failed to calculate for the following reasons:    Cannot find a previous HDL lab    Cannot find a previous total cholesterol lab  Visit Vitals  /80 (BP Location: Right arm, Patient Position: Sitting, BP Method: Medium (Manual))   Pulse 80   Temp 97.8 °F (36.6 °C)   Ht 5' 6" (1.676 m)   Wt 83.2 kg (183 lb 6.8 oz)   SpO2 95%   BMI 29.61 kg/m²      Assessment:       1. Lipid screening    2. Diabetes mellitus screening    3. Thyroid disorder screen    4. Routine medical exam    5. Encounter for hepatitis C screening test for low risk patient    6. Encounter for tobacco use cessation counseling    7. Breast cancer screening by mammogram        Plan:       1. Lipid screening  -     Lipid Panel; Future; Expected date: 05/09/2023    2. Diabetes mellitus screening  -     Hemoglobin A1C; Future; Expected date: 05/09/2023    3. Thyroid disorder screen  -     TSH; Future; Expected date: 05/09/2023    4. Routine " medical exam  -     Lipid Panel; Future; Expected date: 05/09/2023  -     CBC Auto Differential; Future; Expected date: 05/09/2023  -     Comprehensive Metabolic Panel; Future; Expected date: 05/09/2023  -     Hemoglobin A1C; Future; Expected date: 05/09/2023  -     TSH; Future; Expected date: 05/09/2023  -     Microalbumin/Creatinine Ratio, Urine; Future; Expected date: 05/09/2023  -     Cancel: Mammo Digital Screening Bilat w/ Gonzalez; Future; Expected date: 05/09/2023  -     Mammo Digital Screening Bilat w/ Gonzalez; Future; Expected date: 05/09/2023    5. Encounter for hepatitis C screening test for low risk patient  -     Hepatitis C Antibody; Future; Expected date: 05/09/2023    6. Encounter for tobacco use cessation counseling  -     Ambulatory referral/consult to Smoking Cessation Program; Future; Expected date: 05/16/2023    7. Breast cancer screening by mammogram  -     Mammo Digital Screening Bilat w/ Gonzalez; Future; Expected date: 05/09/2023       Follow up in about 6 months (around 11/9/2023), or if symptoms worsen or fail to improve.      Future Appointments       Date Provider Specialty Appt Notes    5/9/2023  Lab labs    6/7/2023 Jamie L. Russo-Digeorge, PA-C Orthopedics 2 wk s/p mass excision

## 2023-05-17 ENCOUNTER — PATIENT MESSAGE (OUTPATIENT)
Dept: ADMINISTRATIVE | Facility: HOSPITAL | Age: 63
End: 2023-05-17
Payer: MEDICARE

## 2023-05-23 RX ORDER — ACETAMINOPHEN 500 MG
1000 TABLET ORAL DAILY PRN
Status: ON HOLD | COMMUNITY
End: 2024-01-23 | Stop reason: HOSPADM

## 2023-05-24 ENCOUNTER — ANESTHESIA EVENT (OUTPATIENT)
Dept: SURGERY | Facility: HOSPITAL | Age: 63
End: 2023-05-24
Payer: MEDICARE

## 2023-05-25 ENCOUNTER — ANESTHESIA (OUTPATIENT)
Dept: SURGERY | Facility: HOSPITAL | Age: 63
End: 2023-05-25
Payer: MEDICARE

## 2023-05-25 ENCOUNTER — HOSPITAL ENCOUNTER (OUTPATIENT)
Facility: HOSPITAL | Age: 63
Discharge: HOME | End: 2023-05-25
Attending: ORTHOPAEDIC SURGERY | Admitting: ORTHOPAEDIC SURGERY
Payer: MEDICARE

## 2023-05-25 ENCOUNTER — PATIENT MESSAGE (OUTPATIENT)
Dept: ADMINISTRATIVE | Facility: HOSPITAL | Age: 63
End: 2023-05-25
Payer: MEDICARE

## 2023-05-25 DIAGNOSIS — R22.32 FINGER MASS, LEFT: ICD-10-CM

## 2023-05-25 DIAGNOSIS — M79.89 MASS OF SOFT TISSUE: ICD-10-CM

## 2023-05-25 DIAGNOSIS — Z01.818 PREOP TESTING: Primary | ICD-10-CM

## 2023-05-25 DIAGNOSIS — R22.32 MASS OF FINGER OF LEFT HAND: ICD-10-CM

## 2023-05-25 DIAGNOSIS — Z01.818 PREOP TESTING: ICD-10-CM

## 2023-05-25 PROCEDURE — 25000003 PHARM REV CODE 250: Performed by: NURSE ANESTHETIST, CERTIFIED REGISTERED

## 2023-05-25 PROCEDURE — 93010 ELECTROCARDIOGRAM REPORT: CPT | Mod: ,,, | Performed by: INTERNAL MEDICINE

## 2023-05-25 PROCEDURE — 99900104 DSU ONLY-NO CHARGE-EA ADD'L HR (STAT): Performed by: ORTHOPAEDIC SURGERY

## 2023-05-25 PROCEDURE — D9220A PRA ANESTHESIA: ICD-10-PCS | Mod: ANES,,, | Performed by: ANESTHESIOLOGY

## 2023-05-25 PROCEDURE — D9220A PRA ANESTHESIA: Mod: CRNA,,, | Performed by: NURSE ANESTHETIST, CERTIFIED REGISTERED

## 2023-05-25 PROCEDURE — 88304 PR  SURG PATH,LEVEL III: ICD-10-PCS | Mod: 26,,, | Performed by: PATHOLOGY

## 2023-05-25 PROCEDURE — 37000009 HC ANESTHESIA EA ADD 15 MINS: Performed by: ORTHOPAEDIC SURGERY

## 2023-05-25 PROCEDURE — 99900103 DSU ONLY-NO CHARGE-INITIAL HR (STAT): Performed by: ORTHOPAEDIC SURGERY

## 2023-05-25 PROCEDURE — 37000008 HC ANESTHESIA 1ST 15 MINUTES: Performed by: ORTHOPAEDIC SURGERY

## 2023-05-25 PROCEDURE — 63600175 PHARM REV CODE 636 W HCPCS: Performed by: NURSE ANESTHETIST, CERTIFIED REGISTERED

## 2023-05-25 PROCEDURE — 71000033 HC RECOVERY, INTIAL HOUR: Performed by: ORTHOPAEDIC SURGERY

## 2023-05-25 PROCEDURE — 36000706: Performed by: ORTHOPAEDIC SURGERY

## 2023-05-25 PROCEDURE — 93005 ELECTROCARDIOGRAM TRACING: CPT

## 2023-05-25 PROCEDURE — 25000003 PHARM REV CODE 250: Performed by: ORTHOPAEDIC SURGERY

## 2023-05-25 PROCEDURE — 36000707: Performed by: ORTHOPAEDIC SURGERY

## 2023-05-25 PROCEDURE — 88304 TISSUE EXAM BY PATHOLOGIST: CPT | Performed by: PATHOLOGY

## 2023-05-25 PROCEDURE — 26111 PR EX TUM/VASC MALF SFT TISS HAND/FNGR SUBQ 1.5+CM: ICD-10-PCS | Mod: F1,,, | Performed by: ORTHOPAEDIC SURGERY

## 2023-05-25 PROCEDURE — D9220A PRA ANESTHESIA: ICD-10-PCS | Mod: CRNA,,, | Performed by: NURSE ANESTHETIST, CERTIFIED REGISTERED

## 2023-05-25 PROCEDURE — 26111 EXC HAND LES SC 1.5 CM/>: CPT | Mod: F1,,, | Performed by: ORTHOPAEDIC SURGERY

## 2023-05-25 PROCEDURE — D9220A PRA ANESTHESIA: Mod: ANES,,, | Performed by: ANESTHESIOLOGY

## 2023-05-25 PROCEDURE — 88304 TISSUE EXAM BY PATHOLOGIST: CPT | Mod: 26,,, | Performed by: PATHOLOGY

## 2023-05-25 PROCEDURE — 94799 UNLISTED PULMONARY SVC/PX: CPT

## 2023-05-25 PROCEDURE — 25000003 PHARM REV CODE 250: Performed by: ANESTHESIOLOGY

## 2023-05-25 PROCEDURE — 71000015 HC POSTOP RECOV 1ST HR: Performed by: ORTHOPAEDIC SURGERY

## 2023-05-25 PROCEDURE — 63600175 PHARM REV CODE 636 W HCPCS: Performed by: ORTHOPAEDIC SURGERY

## 2023-05-25 PROCEDURE — 93010 EKG 12-LEAD: ICD-10-PCS | Mod: ,,, | Performed by: INTERNAL MEDICINE

## 2023-05-25 RX ORDER — OXYCODONE HYDROCHLORIDE 5 MG/1
5 TABLET ORAL ONCE AS NEEDED
Status: DISCONTINUED | OUTPATIENT
Start: 2023-05-25 | End: 2023-05-25 | Stop reason: HOSPADM

## 2023-05-25 RX ORDER — SODIUM CHLORIDE, SODIUM LACTATE, POTASSIUM CHLORIDE, CALCIUM CHLORIDE 600; 310; 30; 20 MG/100ML; MG/100ML; MG/100ML; MG/100ML
500 INJECTION, SOLUTION INTRAVENOUS ONCE
Status: DISCONTINUED | OUTPATIENT
Start: 2023-05-25 | End: 2023-05-25 | Stop reason: HOSPADM

## 2023-05-25 RX ORDER — PROCHLORPERAZINE EDISYLATE 5 MG/ML
5 INJECTION INTRAMUSCULAR; INTRAVENOUS EVERY 30 MIN PRN
Status: DISCONTINUED | OUTPATIENT
Start: 2023-05-25 | End: 2023-05-25 | Stop reason: HOSPADM

## 2023-05-25 RX ORDER — KETOROLAC TROMETHAMINE 30 MG/ML
INJECTION, SOLUTION INTRAMUSCULAR; INTRAVENOUS
Status: DISCONTINUED | OUTPATIENT
Start: 2023-05-25 | End: 2023-05-25

## 2023-05-25 RX ORDER — HYDROMORPHONE HYDROCHLORIDE 2 MG/ML
0.2 INJECTION, SOLUTION INTRAMUSCULAR; INTRAVENOUS; SUBCUTANEOUS EVERY 5 MIN PRN
Status: DISCONTINUED | OUTPATIENT
Start: 2023-05-25 | End: 2023-05-25 | Stop reason: HOSPADM

## 2023-05-25 RX ORDER — CEFAZOLIN SODIUM 2 G/50ML
2 SOLUTION INTRAVENOUS
Status: DISCONTINUED | OUTPATIENT
Start: 2023-05-25 | End: 2023-05-25 | Stop reason: HOSPADM

## 2023-05-25 RX ORDER — HYDROMORPHONE HYDROCHLORIDE 2 MG/ML
0.5 INJECTION, SOLUTION INTRAMUSCULAR; INTRAVENOUS; SUBCUTANEOUS
Status: DISCONTINUED | OUTPATIENT
Start: 2023-05-25 | End: 2023-05-25 | Stop reason: HOSPADM

## 2023-05-25 RX ORDER — ONDANSETRON HYDROCHLORIDE 2 MG/ML
INJECTION, SOLUTION INTRAMUSCULAR; INTRAVENOUS
Status: DISCONTINUED | OUTPATIENT
Start: 2023-05-25 | End: 2023-05-25

## 2023-05-25 RX ORDER — LIDOCAINE HYDROCHLORIDE 10 MG/ML
INJECTION, SOLUTION EPIDURAL; INFILTRATION; INTRACAUDAL; PERINEURAL
Status: DISCONTINUED | OUTPATIENT
Start: 2023-05-25 | End: 2023-05-25 | Stop reason: HOSPADM

## 2023-05-25 RX ORDER — PROPOFOL 10 MG/ML
VIAL (ML) INTRAVENOUS
Status: DISCONTINUED | OUTPATIENT
Start: 2023-05-25 | End: 2023-05-25

## 2023-05-25 RX ORDER — LORAZEPAM 2 MG/ML
0.25 INJECTION INTRAMUSCULAR ONCE AS NEEDED
Status: DISCONTINUED | OUTPATIENT
Start: 2023-05-25 | End: 2023-05-25 | Stop reason: HOSPADM

## 2023-05-25 RX ORDER — MIDAZOLAM HYDROCHLORIDE 1 MG/ML
INJECTION INTRAMUSCULAR; INTRAVENOUS
Status: DISCONTINUED | OUTPATIENT
Start: 2023-05-25 | End: 2023-05-25

## 2023-05-25 RX ORDER — LIDOCAINE HYDROCHLORIDE 10 MG/ML
1 INJECTION, SOLUTION EPIDURAL; INFILTRATION; INTRACAUDAL; PERINEURAL ONCE
Status: DISCONTINUED | OUTPATIENT
Start: 2023-05-25 | End: 2023-05-25 | Stop reason: HOSPADM

## 2023-05-25 RX ORDER — MEPERIDINE HYDROCHLORIDE 50 MG/ML
12.5 INJECTION INTRAMUSCULAR; INTRAVENOUS; SUBCUTANEOUS ONCE AS NEEDED
Status: DISCONTINUED | OUTPATIENT
Start: 2023-05-25 | End: 2023-05-25 | Stop reason: HOSPADM

## 2023-05-25 RX ORDER — TRAMADOL HYDROCHLORIDE 50 MG/1
50 TABLET ORAL EVERY 6 HOURS
Qty: 28 TABLET | Refills: 0 | Status: SHIPPED | OUTPATIENT
Start: 2023-05-25 | End: 2023-08-03

## 2023-05-25 RX ORDER — ONDANSETRON 2 MG/ML
4 INJECTION INTRAMUSCULAR; INTRAVENOUS ONCE AS NEEDED
Status: DISCONTINUED | OUTPATIENT
Start: 2023-05-25 | End: 2023-05-25 | Stop reason: HOSPADM

## 2023-05-25 RX ORDER — ONDANSETRON 4 MG/1
8 TABLET, ORALLY DISINTEGRATING ORAL EVERY 8 HOURS PRN
Status: DISCONTINUED | OUTPATIENT
Start: 2023-05-25 | End: 2023-05-25 | Stop reason: HOSPADM

## 2023-05-25 RX ORDER — DEXAMETHASONE SODIUM PHOSPHATE 4 MG/ML
INJECTION, SOLUTION INTRA-ARTICULAR; INTRALESIONAL; INTRAMUSCULAR; INTRAVENOUS; SOFT TISSUE
Status: DISCONTINUED | OUTPATIENT
Start: 2023-05-25 | End: 2023-05-25

## 2023-05-25 RX ORDER — ACETAMINOPHEN 10 MG/ML
INJECTION, SOLUTION INTRAVENOUS
Status: DISCONTINUED | OUTPATIENT
Start: 2023-05-25 | End: 2023-05-25

## 2023-05-25 RX ORDER — MUPIROCIN 20 MG/G
OINTMENT TOPICAL
Status: DISCONTINUED | OUTPATIENT
Start: 2023-05-25 | End: 2023-05-25 | Stop reason: HOSPADM

## 2023-05-25 RX ORDER — LIDOCAINE HYDROCHLORIDE 20 MG/ML
INJECTION INTRAVENOUS
Status: DISCONTINUED | OUTPATIENT
Start: 2023-05-25 | End: 2023-05-25

## 2023-05-25 RX ORDER — SODIUM CHLORIDE 0.9 % (FLUSH) 0.9 %
3 SYRINGE (ML) INJECTION EVERY 8 HOURS
Status: DISCONTINUED | OUTPATIENT
Start: 2023-05-25 | End: 2023-05-25 | Stop reason: HOSPADM

## 2023-05-25 RX ORDER — DIPHENHYDRAMINE HYDROCHLORIDE 50 MG/ML
12.5 INJECTION INTRAMUSCULAR; INTRAVENOUS ONCE AS NEEDED
Status: DISCONTINUED | OUTPATIENT
Start: 2023-05-25 | End: 2023-05-25 | Stop reason: HOSPADM

## 2023-05-25 RX ORDER — FENTANYL CITRATE 50 UG/ML
INJECTION, SOLUTION INTRAMUSCULAR; INTRAVENOUS
Status: DISCONTINUED | OUTPATIENT
Start: 2023-05-25 | End: 2023-05-25

## 2023-05-25 RX ORDER — SODIUM CHLORIDE, SODIUM LACTATE, POTASSIUM CHLORIDE, CALCIUM CHLORIDE 600; 310; 30; 20 MG/100ML; MG/100ML; MG/100ML; MG/100ML
10 INJECTION, SOLUTION INTRAVENOUS CONTINUOUS
Status: DISCONTINUED | OUTPATIENT
Start: 2023-05-25 | End: 2023-05-25 | Stop reason: HOSPADM

## 2023-05-25 RX ORDER — OXYCODONE HYDROCHLORIDE 10 MG/1
10 TABLET ORAL EVERY 4 HOURS PRN
Status: DISCONTINUED | OUTPATIENT
Start: 2023-05-25 | End: 2023-05-25 | Stop reason: HOSPADM

## 2023-05-25 RX ORDER — FENTANYL CITRATE 50 UG/ML
25 INJECTION, SOLUTION INTRAMUSCULAR; INTRAVENOUS EVERY 5 MIN PRN
Status: DISCONTINUED | OUTPATIENT
Start: 2023-05-25 | End: 2023-05-25 | Stop reason: HOSPADM

## 2023-05-25 RX ADMIN — PROPOFOL 20 MG: 10 INJECTION, EMULSION INTRAVENOUS at 10:05

## 2023-05-25 RX ADMIN — KETOROLAC TROMETHAMINE 15 MG: 30 INJECTION, SOLUTION INTRAMUSCULAR; INTRAVENOUS at 10:05

## 2023-05-25 RX ADMIN — ONDANSETRON 4 MG: 2 INJECTION INTRAMUSCULAR; INTRAVENOUS at 10:05

## 2023-05-25 RX ADMIN — FENTANYL CITRATE 50 MCG: 0.05 INJECTION, SOLUTION INTRAMUSCULAR; INTRAVENOUS at 10:05

## 2023-05-25 RX ADMIN — MIDAZOLAM HYDROCHLORIDE 1 MG: 1 INJECTION, SOLUTION INTRAMUSCULAR; INTRAVENOUS at 10:05

## 2023-05-25 RX ADMIN — ACETAMINOPHEN 1000 MG: 10 INJECTION, SOLUTION INTRAVENOUS at 10:05

## 2023-05-25 RX ADMIN — MUPIROCIN: 20 OINTMENT TOPICAL at 08:05

## 2023-05-25 RX ADMIN — PROPOFOL 30 MG: 10 INJECTION, EMULSION INTRAVENOUS at 10:05

## 2023-05-25 RX ADMIN — SODIUM CHLORIDE, SODIUM GLUCONATE, SODIUM ACETATE, POTASSIUM CHLORIDE AND MAGNESIUM CHLORIDE: 526; 502; 368; 37; 30 INJECTION, SOLUTION INTRAVENOUS at 09:05

## 2023-05-25 RX ADMIN — CEFAZOLIN SODIUM 2 G: 2 SOLUTION INTRAVENOUS at 10:05

## 2023-05-25 RX ADMIN — PROPOFOL 50 MG: 10 INJECTION, EMULSION INTRAVENOUS at 10:05

## 2023-05-25 RX ADMIN — LIDOCAINE HYDROCHLORIDE 20 MG: 20 INJECTION, SOLUTION INTRAVENOUS at 10:05

## 2023-05-25 RX ADMIN — GLYCOPYRROLATE 0.1 MG: 0.2 INJECTION, SOLUTION INTRAMUSCULAR; INTRAVITREAL at 10:05

## 2023-05-25 RX ADMIN — DEXAMETHASONE SODIUM PHOSPHATE 4 MG: 4 INJECTION, SOLUTION INTRA-ARTICULAR; INTRALESIONAL; INTRAMUSCULAR; INTRAVENOUS; SOFT TISSUE at 10:05

## 2023-05-25 NOTE — ANESTHESIA POSTPROCEDURE EVALUATION
Anesthesia Post Evaluation    Patient: Regina Brown    Procedure(s) Performed: Procedure(s) (LRB):  EXCISION, MASS, INDEX FINGER (Left)    Final Anesthesia Type: MAC      Patient location during evaluation: PACU  Patient participation: Yes- Able to Participate  Level of consciousness: awake and alert  Post-procedure vital signs: reviewed and stable  Pain management: adequate  Airway patency: patent    PONV status at discharge: No PONV  Anesthetic complications: no      Cardiovascular status: hemodynamically stable  Respiratory status: unassisted and room air  Hydration status: euvolemic  Follow-up not needed.          Vitals Value Taken Time   /85 05/25/23 1132   Temp 36.6 °C (97.8 °F) 05/25/23 1130   Pulse 87 05/25/23 1132   Resp 20 05/25/23 1132   SpO2 99 % 05/25/23 1132         Event Time   Out of Recovery 11:32:00         Pain/Susan Score: Pain Rating Post Med Admin: 0 (5/25/2023 11:38 AM)  Susan Score: 10 (5/25/2023 11:30 AM)  Modified Susan Score: 20 (5/25/2023 11:38 AM)

## 2023-05-25 NOTE — OP NOTE
DATE OF PROCEDURE:05/25/2023     COVID-19 attestation:  Due to the nature of this patient's condition and/or the presence of pain, debilitty, and/or dysfunction, proceeding with surgery was indicated.  Furthermore, this patient was treated during the COVID-19 pandemic.  This was discussed with the patient, they are aware of our current policies and procedures, were given the option of delaying their surgery when applicable and if acceptable, and they elect to proceed.  Delaying this patient's surgery greater than 30 days would be detrimental to their care and functional outcome and/or cause additional suffering, pain, discomfort, and/or dysfunction/debility.  This was confirmed and we thus proceeded.       SERVICE:  Orthopedic Surgery.     SURGEON:  Migel Richards M.D.     FIRST ASSISTANT:  SMA Jag     PREOPERATIVE DIAGNOSIS:    Left index finger mass     POSTOPERATIVE DIAGNOSIS:    Same     PROCEDURE(S) PERFORMED:    Excisional biopsy left index finger mass, approx 2.5cm x 2cm x 2cm     TOURNIQUET TIME:  23 minutes at 250mmHg.     ESTIMATED BLOOD LOSS:  1 mL.     IMPLANTS:  None     COMPLICATIONS:  None.     PACKS AND DRAINS:  None.     PATHOLOGIC SPECIMENS:  The mass was sent for pathology.     ANESTHESIA:  Local MAC     IV FLUIDS: Crystalloid.     CONDITION:  Stable.     MICROBIOLOGY: None.     Indications for Procedure:      The patient is a 63-year-old male who presented with a significantly symptomatic mass to the left index finger.   The patient has not responded to adequate non operative treatment at this time and/or non operative treatment is not indicated. Thus, the risks, benefits and alternatives to surgery were discussed with the patient in detail.  Specific risks include but are not limited to bleeding, infection, vessel and/or nerve damage, pain, numbness, tingling, compartment syndrome, need for additional surgery, failure to return to pre-injury and/or preoperative functional status, inability to  Please let patient know that I placed an order for COVID testing. Thank you. return to work, scar sensitivity, delayed healing, complex regional pain syndrome, weakness, pulley injury, tendon injury, bowstringing, partial and/or incomplete relief of symptoms, persistence of and/or worsening of symptoms, hardware and/or surgical failure, prominent and/or symptomatic hardware possibly necessitating future removal, osteomyelitis, amputation, loss of function, stiffness, rotational malalignment, functional debility, dysfunction, decreased  strength, need for prolonged postoperative rehabilitation, malunion, nonunion, deep venous thrombosis, pulmonary embolism, arthritis and death.  The patient states an understanding and wishes to proceed with surgery.   All questions were answered.  No guarantees were implied or stated.  Written informed consent was obtained.     Procedure in detail:     On the date of the operative intervention, the patient was evaluated in the preoperative holding area.  With their participation the left upper extremity was marked at the operative site.  I circled the mass with her participation.  SHe was in agreement that this was the mass in question for today's procedure.  The patient was then taken to the operating room where they were placed on OR table.  The left upper extremity extremity was then placed on a hand table with a nonsterile tourniquet placed high on the patient's left upper extremity.  The left upper extremity extremity was then prepped and draped in the usual sterile fashion and time-out was taken and confirmed by all present to confirm the correct patient site procedure and administration of preoperative antibiotics if ordered.  All were in agreement so I proceeded.  1% plain lidocaine was utilized for digital block to the left index finger.  After adequate analgesia, Esmarch was utilized to exsanguinate the left upper extremity tourniquet was insufflated to 250 mm mercury.  An approximately 5cm Brunner incision was made overlying the left index  finger centered at the mass.  Dissection was carried down through skin and subcutaneous tissues.  Full thickness flaps were developed.  Radial and ulnar neurovascular bundles were identified and protected.  Upon entering the subcutaneous tissues, I was able to immediately and readily identify a large, significant mass largely atop the flexor sheath but extending radially and ulnarly with an intimate relationship with the bundles.  This was able to be freed from adjacent tissues and completely removed.  It was then passed off the field for pathology specimen.  At this point in time I then examined the wound there was no evidence of any residual or remaining mass.  The wound was irrigated with copious amounts of sterile saline.  Skin was closed with 4-0 nylon suture soft dressings were applied.  Tourniquet was deflated brisk capillary refill in [place] throughout the entire left upper extremity.  The patient was then awakened from anesthesia return the post anesthesia care in stable condition.  There were no complications as attending surgeon was present performed the critical portions of the procedure.      Postop plan the patient:  The patient will be discharged home in stable condition.  Range of motion as tolerated.  Follow-up in 2 weeks.  Pathology will be followed.     Please be aware that this note has been generated with the assistance of [facility] voice-to-text.  Please excuse any spelling or grammatical errors.

## 2023-05-25 NOTE — PLAN OF CARE
Pt prepped for surgery. Consents at bedside. Incentive spirometry taught by respiratory. Pt's clothing placed in postop cabinet. Glasses and upper dentures given to pt's family. Family set up with text alerts.

## 2023-05-25 NOTE — DISCHARGE SUMMARY
[facility]  Discharge Note  Short Stay    Procedure(s) (LRB):  EXCISION, MASS - left IF large DIP (Left)      OUTCOME: Patient tolerated treatment/procedure well without complication and is now ready for discharge.    DISPOSITION: Home or Self Care    FINAL DIAGNOSIS:  <principal problem not specified>    FOLLOWUP: In clinic    DISCHARGE INSTRUCTIONS:    Discharge Procedure Orders   Diet general     Leave dressing on - Keep it clean, dry, and intact until clinic visit     Call MD for:  temperature >100.4     Call MD for:  persistent nausea and vomiting     Call MD for:  severe uncontrolled pain     Call MD for:  difficulty breathing, headache or visual disturbances     Call MD for:  redness, tenderness, or signs of infection (pain, swelling, redness, odor or green/yellow discharge around incision site)     Call MD for:  hives     Call MD for:  persistent dizziness or light-headedness     Call MD for:  extreme fatigue        TIME SPENT ON DISCHARGE: 2 minutes

## 2023-05-25 NOTE — H&P
[facility]  History & Physical  Orthopedics     SUBJECTIVE:       Chief Complaint: Left index finger mass     Referring Provider: Russo-Digeorge, Jamie L* Dr. Dunbar is the supervising physician for this encounter/patient     History of Present Illness:  Patient is a 63 y.o. right hand dominant female who presents today with complaints of left index finger mass, present since 2005. She says it started as what looked like a small wart to the underside of the DIP, this then increased in size over a year and stayed the same. She has noticed some increased size recently. This causes no pain and she has full motion, however she would like it removed.      Interval history April 20, 2023: The patient returns today for re-evaluation.  She notes that she has a longstanding history of left index finger mass.  She notes that this was initially needled by her dermatology provider to cauterize it and that she experience some growth subsequently thereafter.  She now notes that this mass has continued enlarging.  She had a recent MRI and returns for these results and re-evaluation.  No other complaints today.     Onset of symptoms/DOI was 2005.     Symptoms are aggravated by  none .     Symptoms are alleviated by  none .     Symptoms consist of  mass .     The patient rates their pain as a 0/10.     Attempted treatment(s) and/or interventions include activity modifications, rest.     The patient denies any fevers, chills, N/V, D/C and presents for evaluation.                Past Medical History:   Diagnosis Date    HTN (hypertension)                  Past Surgical History:   Procedure Laterality Date    APPENDECTOMY        ELBOW SURGERY Right 1998              Review of patient's allergies indicates:   Allergen Reactions    Codeine      Opioids-meperidine and related Rash      Social History            Social History Narrative    Not on file      No family history on file.        Current Outpatient  "Medications:     cyclobenzaprine (FLEXERIL) 10 MG tablet, Take 1 tablet (10 mg total) by mouth 3 (three) times daily as needed for Muscle spasms., Disp: 45 tablet, Rfl: 2        Review of Systems:  Constitutional: no fever or chills  Eyes: no visual changes  ENT: no nasal congestion or sore throat  Respiratory: no cough or shortness of breath  Cardiovascular: no chest pain  Gastrointestinal: no nausea or vomiting, tolerating diet  Musculoskeletal:  mass     OBJECTIVE:       Vital Signs (Most Recent):  Vitals         Vitals:     04/20/23 0848   Weight: 84.8 kg (187 lb)   Height: 5' 6" (1.676 m)         Body mass index is 30.18 kg/m².        Physical Exam:  Constitutional: The patient appears well-developed and well-nourished. No distress.   Skin: No lesions appreciated  Head: Normocephalic and atraumatic.   Nose: Nose normal.   Ears: No deformities seen  Eyes: Conjunctivae and EOM are normal.   Neck: No tracheal deviation present.   Cardiovascular: Normal rate and intact distal pulses.    Pulmonary/Chest: Effort normal. No respiratory distress.   Abdominal: There is no guarding.   Neurological: The patient is alert.   Psychiatric: The patient has a normal mood and affect.      Left Hand/Wrist Examination:     Observation/Inspection:  Swelling                       none                  Deformity                     Large mass noted to the volar surface of the index middle phalanx, this does appear to wrap around to the radial boarder as well.  Discoloration               none                  Scars                           none                  Atrophy                        none     HAND/WRIST EXAMINATION:  Finkelstein's Test                                Neg  WHAT Test                                         Neg  Snuff box tenderness                          Neg  Veloz's Test                                     Neg  Hook of Hamate Tenderness              Neg  CMC grind                                           " Neg  Circumduction test                              Neg  The mass is soft and NTTP     Neurovascular Exam:  Digits WWP, brisk CR < 3s throughout  NVI motor/LTS to M/R/U nerves, radial pulse 2+  Tinel's Test - Carpal Tunnel                Neg  Tinel's Test - Cubital Tunnel               Neg  Phalen's Test                                      Neg  Median Nerve Compression Test       Neg     ROM hand full, painless     ROM wrist full, painless    ROM elbow full, painless     Abdomen not guarded  Respirations nonlabored  Perfusion intact     Diagnostic Results:     Imaging - I independently viewed the patient's imaging as well as the radiology report.       FINDINGS:  No acute fracture.  Slight ulnar minus variance.  Mild degenerative change thumb MCP joint and several finger IP joints.  Along the volar aspect of the index digit there is a rounded mass which is 2.1 x 1.4 cm slightly hyperdense to the adjacent soft tissues.  This is centered at the skin surface and there is no obvious involvement of the subjacent index middle phalanx.     MRI left index finger-Impression:     1. 1.7 x 1.9 x 1.5 cm well-circumscribed lesion within the subcutaneous soft tissues along the volar and ulnar aspects of the 2nd middle phalanx/DIP joint that demonstrates MR imaging characteristics favoring a mucoid cyst.  No associated osseous remodeling.  Lesion abuts the adjacent 2nd flexor tendon without definite intratendinous extension.     Impression:     Indeterminate soft tissue mass along the 2nd digit.  No clear bone involvement.  Ultrasound or contrasted MRI may add further characterization if indicated.        ASSESSMENT/PLAN:       63 y.o. yo female with Left index finger mass     Plan: The patient and I had a thorough discussion today.  We discussed the working diagnosis as well as several other potential alternative diagnoses.  Treatment options were discussed, both conservative and surgical.  Conservative treatment options would  include things such as activity modifications, workplace modifications, a period of rest, oral vs topical OTC and prescription anti-inflammatory medications, occupational therapy, splinting/bracing, immobilization, corticosteroid injections, and others.  Surgical options were discussed as well.      At this time, the patient would like to proceed with excisional biopsy of her large left index finger mass.  We discussed the significant risk of neurovascular injury given the size of this mass and its location.  She would like to proceed with this on May 25, 2023 which I feel is reasonable.       The patient has not responded to adequate non operative treatment at this time and/or non operative treatment is not indicated. Thus, the risks, benefits and alternatives to surgery were discussed with the patient in detail.  Specific risks include but are not limited to bleeding, infection, vessel and/or nerve damage, pain, numbness, tingling, complex regional pain syndrome, compartment syndrome, failure to return to pre-injury and/or preoperative functional status, scar sensitivity, delayed healing, inability to return to work, pulley injury, tendon injury, bowstringing, partial and/or incomplete relief of symptoms, weakness, persistence of and/or worsening of symptoms, surgical failure, osteomyelitis, amputation, loss of function, stiffness, functional debility, dysfunction, decreased  strength, need for prolonged postoperative rehabilitation, need for further surgery, deep venous thrombosis, pulmonary embolism, arthritis and death.  The patient states an understanding and wishes to proceed with surgery.   All questions were answered.  No guarantees were implied or stated.  Written informed consent was obtained.        Please do not hesitate to reach out to us via email, phone, or [employer] with any questions, concerns, or feedback.

## 2023-05-25 NOTE — TRANSFER OF CARE
"Anesthesia Transfer of Care Note    Patient: Regina Brown    Procedure(s) Performed: Procedure(s) (LRB):  EXCISION, MASS, INDEX FINGER (Left)    Patient location: PACU    Anesthesia Type: MAC    Transport from OR: Transported from OR on room air with adequate spontaneous ventilation    Post pain: adequate analgesia    Post assessment: no apparent anesthetic complications and tolerated procedure well    Post vital signs: stable    Level of consciousness: awake    Nausea/Vomiting: no nausea/vomiting    Transfer of care protocol was followed      Last vitals:   Visit Vitals  BP (!) 141/63 (BP Location: Right arm, Patient Position: Lying)   Pulse 75   Temp 36.8 °C (98.2 °F) (Oral)   Resp (!) 21   Ht 5' 6" (1.676 m)   Wt 83 kg (183 lb)   SpO2 97%   Breastfeeding No   BMI 29.54 kg/m²     "

## 2023-05-26 ENCOUNTER — PATIENT MESSAGE (OUTPATIENT)
Dept: FAMILY MEDICINE | Facility: CLINIC | Age: 63
End: 2023-05-26
Payer: MEDICARE

## 2023-05-26 VITALS
DIASTOLIC BLOOD PRESSURE: 85 MMHG | BODY MASS INDEX: 29.41 KG/M2 | TEMPERATURE: 98 F | HEART RATE: 87 BPM | SYSTOLIC BLOOD PRESSURE: 123 MMHG | RESPIRATION RATE: 20 BRPM | OXYGEN SATURATION: 99 % | HEIGHT: 66 IN | WEIGHT: 183 LBS

## 2023-05-30 LAB
FINAL PATHOLOGIC DIAGNOSIS: NORMAL
GROSS: NORMAL
Lab: NORMAL

## 2023-06-07 ENCOUNTER — HOSPITAL ENCOUNTER (OUTPATIENT)
Dept: RADIOLOGY | Facility: HOSPITAL | Age: 63
Discharge: HOME | End: 2023-06-07
Attending: FAMILY MEDICINE
Payer: MEDICARE

## 2023-06-07 ENCOUNTER — OFFICE VISIT (OUTPATIENT)
Dept: ORTHOPEDICS | Facility: CLINIC | Age: 63
End: 2023-06-07
Payer: MEDICARE

## 2023-06-07 DIAGNOSIS — R22.32 MASS OF FINGER OF LEFT HAND: Primary | ICD-10-CM

## 2023-06-07 DIAGNOSIS — Z00.00 ROUTINE MEDICAL EXAM: ICD-10-CM

## 2023-06-07 DIAGNOSIS — Z98.890 POSTOPERATIVE STATE: ICD-10-CM

## 2023-06-07 DIAGNOSIS — Z12.31 BREAST CANCER SCREENING BY MAMMOGRAM: ICD-10-CM

## 2023-06-07 DIAGNOSIS — R92.8 ABNORMAL MAMMOGRAM: Primary | ICD-10-CM

## 2023-06-07 PROCEDURE — 99999 PR PBB SHADOW E&M-EST. PATIENT-LVL II: ICD-10-PCS | Mod: PBBFAC,,, | Performed by: PHYSICIAN ASSISTANT

## 2023-06-07 PROCEDURE — 77067 SCR MAMMO BI INCL CAD: CPT | Mod: 26,,, | Performed by: RADIOLOGY

## 2023-06-07 PROCEDURE — 99999 PR PBB SHADOW E&M-EST. PATIENT-LVL II: CPT | Mod: PBBFAC,,, | Performed by: PHYSICIAN ASSISTANT

## 2023-06-07 PROCEDURE — 77067 MAMMO DIGITAL SCREENING BILAT WITH TOMO: ICD-10-PCS | Mod: 26,,, | Performed by: RADIOLOGY

## 2023-06-07 PROCEDURE — 3044F PR MOST RECENT HEMOGLOBIN A1C LEVEL <7.0%: ICD-10-PCS | Mod: CPTII,S$GLB,, | Performed by: PHYSICIAN ASSISTANT

## 2023-06-07 PROCEDURE — 99024 POSTOP FOLLOW-UP VISIT: CPT | Mod: S$GLB,,, | Performed by: PHYSICIAN ASSISTANT

## 2023-06-07 PROCEDURE — 1159F PR MEDICATION LIST DOCUMENTED IN MEDICAL RECORD: ICD-10-PCS | Mod: CPTII,S$GLB,, | Performed by: PHYSICIAN ASSISTANT

## 2023-06-07 PROCEDURE — 1159F MED LIST DOCD IN RCRD: CPT | Mod: CPTII,S$GLB,, | Performed by: PHYSICIAN ASSISTANT

## 2023-06-07 PROCEDURE — 99024 PR POST-OP FOLLOW-UP VISIT: ICD-10-PCS | Mod: S$GLB,,, | Performed by: PHYSICIAN ASSISTANT

## 2023-06-07 PROCEDURE — 77063 MAMMO DIGITAL SCREENING BILAT WITH TOMO: ICD-10-PCS | Mod: 26,,, | Performed by: RADIOLOGY

## 2023-06-07 PROCEDURE — 3044F HG A1C LEVEL LT 7.0%: CPT | Mod: CPTII,S$GLB,, | Performed by: PHYSICIAN ASSISTANT

## 2023-06-07 PROCEDURE — 77067 SCR MAMMO BI INCL CAD: CPT | Mod: TC

## 2023-06-07 PROCEDURE — 77063 BREAST TOMOSYNTHESIS BI: CPT | Mod: 26,,, | Performed by: RADIOLOGY

## 2023-06-07 NOTE — PROGRESS NOTES
Dr. Richards is the supervising physician for this encounter/patient    Regina Brown presents for post-operative evaluation.  The patient is now 2 weeks s/p Left index finger mass excision with Dr. Richards on 5/25/23.  Overall the patient reports doing well. She reports 0/10 pain, denies any f/c/s, no numbness.     Pathology report - discussed with patient  SOFT TISSUE, LEFT INDEX FINGER, 'MASS', EXCISION:   - Epidermal inclusion cyst with evidence of prior trauma/rupture   - No evidence of malignancy    PE:    AA&O x 4.  NAD  HEENT:  NCAT, sclera nonicteric  Lungs:  Respirations are equal and unlabored.  CV:  2+ bilateral upper and lower extremity pulses.  MSK: The wound is healing well with no signs of erythema or warmth.  There is no drainage.  No clinical signs or symptoms of infection are present.  Full baseline motion of the finger. SILT. DNVI.    A/P: Status post above, doing well  1) Continue with activity as tolerated.  2) All sutures removed today. Wound care and signs of infection discussed.  3) F/U 2 weeks for wound check.  4) Call with any questions/concerns in the interim      Jamie Russo-DiGeorge PA-C

## 2023-06-08 ENCOUNTER — TELEPHONE (OUTPATIENT)
Dept: FAMILY MEDICINE | Facility: CLINIC | Age: 63
End: 2023-06-08
Payer: MEDICARE

## 2023-06-08 NOTE — TELEPHONE ENCOUNTER
Patient saw mammogram results on My Chart and is thinking she has tumor, or worse, encourage patient after the diagnostic and the U/S is done we can have clearer picture, it could just be shadow or denseness of tissue, patient seemed calmer

## 2023-06-08 NOTE — TELEPHONE ENCOUNTER
----- Message from Sara Nestor sent at 6/8/2023  9:26 AM CDT -----  Contact: PT  Type:  Test Results    Who Called: PT   Name of Test (Lab/Mammo/Etc): MAMMO  Date of Test: 06/07/23  Ordering Provider: ELISA  Where the test was performed: [facility]   Would the patient rather a call back or a response via [facility]? CALL   Best Call Back Number: [phone] (home)     Additional Information:  THANK YOU

## 2023-06-15 ENCOUNTER — HOSPITAL ENCOUNTER (OUTPATIENT)
Dept: RADIOLOGY | Facility: HOSPITAL | Age: 63
Discharge: HOME | End: 2023-06-15
Attending: FAMILY MEDICINE
Payer: MEDICARE

## 2023-06-15 DIAGNOSIS — R92.8 ABNORMAL MAMMOGRAM: Primary | ICD-10-CM

## 2023-06-15 DIAGNOSIS — R92.8 ABNORMAL MAMMOGRAM: ICD-10-CM

## 2023-06-15 PROCEDURE — 77062 MAMMO DIGITAL DIAGNOSTIC BILAT WITH TOMO: ICD-10-PCS | Mod: 26,,, | Performed by: RADIOLOGY

## 2023-06-15 PROCEDURE — 76642 US BREAST BILATERAL LIMITED: ICD-10-PCS | Mod: 26,50,, | Performed by: RADIOLOGY

## 2023-06-15 PROCEDURE — 76642 ULTRASOUND BREAST LIMITED: CPT | Mod: TC,50

## 2023-06-15 PROCEDURE — 77066 MAMMO DIGITAL DIAGNOSTIC BILAT WITH TOMO: ICD-10-PCS | Mod: 26,,, | Performed by: RADIOLOGY

## 2023-06-15 PROCEDURE — 77062 BREAST TOMOSYNTHESIS BI: CPT | Mod: TC

## 2023-06-15 PROCEDURE — 77066 DX MAMMO INCL CAD BI: CPT | Mod: 26,,, | Performed by: RADIOLOGY

## 2023-06-15 PROCEDURE — 77062 BREAST TOMOSYNTHESIS BI: CPT | Mod: 26,,, | Performed by: RADIOLOGY

## 2023-06-15 PROCEDURE — 76642 ULTRASOUND BREAST LIMITED: CPT | Mod: 26,50,, | Performed by: RADIOLOGY

## 2023-06-19 ENCOUNTER — PATIENT MESSAGE (OUTPATIENT)
Dept: FAMILY MEDICINE | Facility: CLINIC | Age: 63
End: 2023-06-19
Payer: MEDICARE

## 2023-06-19 DIAGNOSIS — R92.8 ABNORMAL MAMMOGRAM: Primary | ICD-10-CM

## 2023-06-21 ENCOUNTER — OFFICE VISIT (OUTPATIENT)
Dept: ORTHOPEDICS | Facility: CLINIC | Age: 63
End: 2023-06-21
Payer: MEDICARE

## 2023-06-21 DIAGNOSIS — R22.32 MASS OF FINGER OF LEFT HAND: Primary | ICD-10-CM

## 2023-06-21 DIAGNOSIS — Z98.890 POSTOPERATIVE STATE: ICD-10-CM

## 2023-06-21 PROCEDURE — 99024 POSTOP FOLLOW-UP VISIT: CPT | Mod: S$GLB,,, | Performed by: PHYSICIAN ASSISTANT

## 2023-06-21 PROCEDURE — 3044F HG A1C LEVEL LT 7.0%: CPT | Mod: CPTII,S$GLB,, | Performed by: PHYSICIAN ASSISTANT

## 2023-06-21 PROCEDURE — 99999 PR PBB SHADOW E&M-EST. PATIENT-LVL II: CPT | Mod: PBBFAC,,, | Performed by: PHYSICIAN ASSISTANT

## 2023-06-21 PROCEDURE — 3044F PR MOST RECENT HEMOGLOBIN A1C LEVEL <7.0%: ICD-10-PCS | Mod: CPTII,S$GLB,, | Performed by: PHYSICIAN ASSISTANT

## 2023-06-21 PROCEDURE — 99024 PR POST-OP FOLLOW-UP VISIT: ICD-10-PCS | Mod: S$GLB,,, | Performed by: PHYSICIAN ASSISTANT

## 2023-06-21 PROCEDURE — 1159F MED LIST DOCD IN RCRD: CPT | Mod: CPTII,S$GLB,, | Performed by: PHYSICIAN ASSISTANT

## 2023-06-21 PROCEDURE — 1159F PR MEDICATION LIST DOCUMENTED IN MEDICAL RECORD: ICD-10-PCS | Mod: CPTII,S$GLB,, | Performed by: PHYSICIAN ASSISTANT

## 2023-06-21 PROCEDURE — 99999 PR PBB SHADOW E&M-EST. PATIENT-LVL II: ICD-10-PCS | Mod: PBBFAC,,, | Performed by: PHYSICIAN ASSISTANT

## 2023-06-21 NOTE — PROGRESS NOTES
Dr. Richards is the supervising physician for this encounter/patient    Regina Brown presents for post-operative evaluation.  The patient is now 4 weeks s/p Left index finger mass excision with Dr. Richards on 5/25/23.  Overall the patient reports doing well. She reports 0/10 pain, denies any f/c/s, no numbness. She is back because she thinks the mass is returning.    Pathology report - discussed with patient  SOFT TISSUE, LEFT INDEX FINGER, 'MASS', EXCISION:   - Epidermal inclusion cyst with evidence of prior trauma/rupture   - No evidence of malignancy    PE:    AA&O x 4.  NAD  HEENT:  NCAT, sclera nonicteric  Lungs:  Respirations are equal and unlabored.  CV:  2+ bilateral upper and lower extremity pulses.  MSK: The wound is well healed without any signs of infection. There is one small area to the distal incision that appears like either scar tissue is present or a retained suture piece. This is NTTP.  Full baseline motion of the finger. SILT. DNVI.    A/P: Status post above, doing well. No recurrence of the mass, possible retained suture or scar tissue  1) Continue with activity as tolerated.  2) Scar massage discussed, warm compresses.  3) F/U as needed.  4) Call with any questions/concerns in the interim      Jamie Russo-DiGeorge PA-C

## 2023-06-28 ENCOUNTER — CLINICAL SUPPORT (OUTPATIENT)
Dept: SMOKING CESSATION | Facility: CLINIC | Age: 63
End: 2023-06-28

## 2023-06-28 DIAGNOSIS — F17.210 MODERATE SMOKER (20 OR LESS PER DAY): Primary | ICD-10-CM

## 2023-06-28 PROCEDURE — 99404 PREV MED CNSL INDIV APPRX 60: CPT | Mod: S$GLB,,, | Performed by: INTERNAL MEDICINE

## 2023-06-28 PROCEDURE — 99404 PR PREVENT COUNSEL,INDIV,60 MIN: ICD-10-PCS | Mod: S$GLB,,, | Performed by: INTERNAL MEDICINE

## 2023-06-28 RX ORDER — VARENICLINE TARTRATE 1 MG/1
1 TABLET, FILM COATED ORAL 2 TIMES DAILY
Qty: 56 TABLET | Refills: 0 | Status: SHIPPED | OUTPATIENT
Start: 2023-06-28 | End: 2023-07-20 | Stop reason: SDUPTHER

## 2023-06-28 NOTE — Clinical Note
Patient seen for the tobacco cessation program. This is her first attempt to stop smoking through our program. She is a cigarette smoker of 1 PPD X 47  years. She is motivated to quit the use of tobacco and has agreed to attend our 6 week tobacco cessation program.

## 2023-07-07 ENCOUNTER — PATIENT MESSAGE (OUTPATIENT)
Dept: ADMINISTRATIVE | Facility: HOSPITAL | Age: 63
End: 2023-07-07
Payer: MEDICARE

## 2023-07-07 ENCOUNTER — PATIENT OUTREACH (OUTPATIENT)
Dept: ADMINISTRATIVE | Facility: HOSPITAL | Age: 63
End: 2023-07-07
Payer: MEDICARE

## 2023-07-07 NOTE — PROGRESS NOTES
Population Health Chart Review & Patient Outreach Details:     Reason for Outreach Encounter:     [x]  Non-Compliant Report   []  Payor Report ([employer], PHN, BCBS, MSSP, MCIP, C, etc.)   []  Pre-Visit Chart Review     Updates Requested / Reviewed:     [x]  Care Everywhere    [x]     [x]  External Sources ([facility], [facility], DIS, etc.)   []  Care Team Updated    Patient Outreach Method:    [x]  Telephone Outreach Completed   [] Successful   [] Left Voicemail   [x] Unable to Contact (wrong number, no voicemail)  [x]  [facility] Outreach Sent  []  Letter Outreach Mailed  []  Fax Sent for External Records  []  External Records Upload    Health Maintenance Topics Addressed and Outreach Outcomes / Actions Taken:        []      Breast Cancer Screening []  Mammo Scheduled      []  External Records Requested     []  Added Reminder to Complete to Upcoming Primary Care Appt Notes     []  Patient Declined     []  Patient Will Call Back to Schedule     []  Patient Will Schedule with External Provider / Order Routed if Applicable             [x]       Cervical Cancer Screening []  Pap Scheduled      []  External Records Requested     []  Added Reminder to Complete to Upcoming Primary Care Appt Notes     []  Patient Declined     []  Patient Will Call Back to Schedule     []  Patient Will Schedule with External Provider               []          Colorectal Cancer Screening []  Colonoscopy Case Request or Referral Placed     []  External Records Requested     []  Added Reminder to Complete to Upcoming Primary Care Appt Notes     []  Patient Declined     []  Patient Will Call Back to Schedule     []  Patient Will Schedule with External Provider     []  Fit Kit Mailed (add the SmartPhrase under additional notes)     []  Reminded Patient to Complete Home Test             []      Diabetic Eye Exam []  Eye Camera Scheduled or Optometry Referral Placed     []  External Records Requested     []  Added Reminder to Complete  to Upcoming Primary Care Appt Notes     []  Patient Declined     []  Patient Will Call Back to Schedule     []  Patient Will Schedule with External Provider             []      Blood Pressure Control []  Primary Care Follow Up Visit Scheduled     []  Remote Blood Pressure Reading Captured     []  Added Reminder to Complete to Upcoming Primary Care Appt Notes     []  Patient Declined     []  Patient Will Call Back / Patient Will Send Portal Message with Reading     []  Patient Will Call Back to Schedule Provider Visit             []       HbA1c & Other Labs []  Lab Appt Scheduled for Due Labs     []  Primary Care Follow Up Visit Scheduled      []  Reminded Patient to Complete Home Test     []  Added Reminder to Complete to Upcoming Primary Care Appt Notes     []  Patient Declined     []  Patient Will Call Back to Schedule     []  Patient Will Schedule with External Provider / Order Routed if Applicable           []    Schedule Primary Care Appt []  Primary Care Appt Scheduled     []  Patient Declined     []  Patient Will Call Back to Schedule     []  Pt Established with External Provider & Updated Care Team             []      Medication Adherence []  Primary Care Appointment Scheduled     []  Added Reminder to Upcoming Primary Care Appt Notes     []  Patient Reminded to  Prescription     []  Patient Declined, Provider Notified if Needed     []  Sent Provider Message to Review and/or Add Exclusion to Problem List             []      Osteoporosis Screening []  DXA Appointment Scheduled     []  External Records Requested     []  Added Reminder to Complete to Upcoming Primary Care Appt Notes     []  Patient Declined     []  Patient Will Call Back to Schedule     []  Patient Will Schedule with External Provider / Order Routed if Applicable     Additional Care Coordinator Notes:         Further Action Needed If Patient Returns Outreach:

## 2023-07-20 ENCOUNTER — CLINICAL SUPPORT (OUTPATIENT)
Dept: SMOKING CESSATION | Facility: CLINIC | Age: 63
End: 2023-07-20

## 2023-07-20 DIAGNOSIS — F17.210 CIGARETTE NICOTINE DEPENDENCE, UNCOMPLICATED: Primary | ICD-10-CM

## 2023-07-20 DIAGNOSIS — F17.210 MODERATE SMOKER (20 OR LESS PER DAY): ICD-10-CM

## 2023-07-20 PROCEDURE — 99407 PR TOBACCO USE CESSATION INTENSIVE >10 MINUTES: ICD-10-PCS | Mod: S$GLB,,, | Performed by: NURSE PRACTITIONER

## 2023-07-20 PROCEDURE — 99407 BEHAV CHNG SMOKING > 10 MIN: CPT | Mod: S$GLB,,, | Performed by: NURSE PRACTITIONER

## 2023-07-20 RX ORDER — VARENICLINE TARTRATE 1 MG/1
1 TABLET, FILM COATED ORAL 2 TIMES DAILY
Qty: 56 TABLET | Refills: 0 | Status: SHIPPED | OUTPATIENT
Start: 2023-07-20 | End: 2023-08-08 | Stop reason: SDUPTHER

## 2023-08-01 ENCOUNTER — TELEPHONE (OUTPATIENT)
Dept: FAMILY MEDICINE | Facility: CLINIC | Age: 63
End: 2023-08-01
Payer: MEDICARE

## 2023-08-01 ENCOUNTER — CLINICAL SUPPORT (OUTPATIENT)
Dept: SMOKING CESSATION | Facility: CLINIC | Age: 63
End: 2023-08-01

## 2023-08-01 DIAGNOSIS — F17.210 CIGARETTE NICOTINE DEPENDENCE, UNCOMPLICATED: Primary | ICD-10-CM

## 2023-08-01 PROCEDURE — 99404 PR PREVENT COUNSEL,INDIV,60 MIN: ICD-10-PCS | Mod: ,,, | Performed by: NURSE PRACTITIONER

## 2023-08-01 PROCEDURE — 99999 PR PBB SHADOW E&M-EST. PATIENT-LVL II: CPT | Mod: PBBFAC,,,

## 2023-08-01 PROCEDURE — 99404 PREV MED CNSL INDIV APPRX 60: CPT | Mod: ,,, | Performed by: NURSE PRACTITIONER

## 2023-08-01 PROCEDURE — 99999 PR PBB SHADOW E&M-EST. PATIENT-LVL II: ICD-10-PCS | Mod: PBBFAC,,,

## 2023-08-01 NOTE — PROGRESS NOTES
Individual Follow-Up Form    8/1/2023    Quit Date: 7/1/23    Clinical Status of Patient: Outpatient    Length of Service: 60 minutes    Continuing Medication: yes  Chantix    Other Medications: none     Target Symptoms: Withdrawal and medication side effects. The following were rated moderate (3) to severe (4) on TCRS:  Moderate (3): none  Severe (4): none    Comments: #1 Patient reports remaining tobacco free since 7/1/23. She shared lighting one cigarette during this time frame and states it was nasty. She is pleased with her progress and intends to never smoke again. Her spouse is quitting on his own and is down to 5 cigarettes per day.  We discussed and reviewed strategies, cues, and triggers. Introduced the negative impact of tobacco on health, the health advantages of discontinuing the use of tobacco, time line improved health changes after a quit, withdrawal issues to expect from nicotine and habit, and ways to achieve the goal of a quit. The patient remains on the prescribed tobacco cessation medication regimen of 1 mg Chantix BID without any negative side effects at this time. The patient denies any abnormal behavioral or mental changes at this time.The patient will continue with therapy sessions and medication monitoring by CTTS. Prescribed medication management will be by physician.     Diagnosis: F17.210    Next Visit: 1 week

## 2023-08-01 NOTE — TELEPHONE ENCOUNTER
----- Message from Pebbles Carranza sent at 8/1/2023 11:56 AM CDT -----  Contact: pt  SCHEDULE    Patient needs a 3 month follow up for finger and meds, system wouldn't let me schedule with Pierce, please call her at [phone]

## 2023-08-01 NOTE — Clinical Note
Patient reports remaining tobacco free since 7/1/23. She shared lighting one cigarette during this time frame and states it was nasty. She is pleased with her progress and intends to never smoke again. Her spouse is quitting on his own and is down to 5 cigarettes per day.  We discussed and reviewed strategies, cues, and triggers. Introduced the negative impact of tobacco on health, the health advantages of discontinuing the use of tobacco, time line improved health changes after a quit, withdrawal issues to expect from nicotine and habit, and ways to achieve the goal of a quit. The patient remains on the prescribed tobacco cessation medication regimen of 1 mg Chantix BID without any negative side effects at this time. The patient denies any abnormal behavioral or mental changes at this time.The patient will continue with therapy sessions and medication monitoring by CTTS. Prescribed medication management will be by physician.

## 2023-08-01 NOTE — TELEPHONE ENCOUNTER
Unsure if we will be able to get patient scheduled for the specific time frame requested with Dr. Pelayo specifically due to provider's schedule, but can search for alternative options for patient.     Called patient - no answer; left VM requesting phone call back to the office.

## 2023-08-01 NOTE — TELEPHONE ENCOUNTER
----- Message from Nora Hawthorne sent at 8/1/2023  9:34 AM CDT -----  Regarding: 3 month follow up  Patient is wanting to do a 3 month follow up but it will not let me book due to schedule being full

## 2023-08-02 NOTE — TELEPHONE ENCOUNTER
Called patient to discuss - no answer; left VM requesting phone call back to office.     Patient returned call to office - patient is now scheduled for tomorrow morning with Dr. Pelayo at 8am.

## 2023-08-03 ENCOUNTER — OFFICE VISIT (OUTPATIENT)
Dept: FAMILY MEDICINE | Facility: CLINIC | Age: 63
End: 2023-08-03
Payer: MEDICARE

## 2023-08-03 VITALS
HEART RATE: 74 BPM | RESPIRATION RATE: 17 BRPM | WEIGHT: 179.44 LBS | DIASTOLIC BLOOD PRESSURE: 78 MMHG | SYSTOLIC BLOOD PRESSURE: 118 MMHG | HEIGHT: 66 IN | OXYGEN SATURATION: 97 % | BODY MASS INDEX: 28.84 KG/M2

## 2023-08-03 DIAGNOSIS — R92.8 ABNORMAL MAMMOGRAM: ICD-10-CM

## 2023-08-03 DIAGNOSIS — E78.00 HYPERCHOLESTEREMIA: Primary | ICD-10-CM

## 2023-08-03 DIAGNOSIS — R09.89 BRUIT OF RIGHT CAROTID ARTERY: ICD-10-CM

## 2023-08-03 PROCEDURE — 3008F PR BODY MASS INDEX (BMI) DOCUMENTED: ICD-10-PCS | Mod: CPTII,,, | Performed by: FAMILY MEDICINE

## 2023-08-03 PROCEDURE — 3008F BODY MASS INDEX DOCD: CPT | Mod: CPTII,,, | Performed by: FAMILY MEDICINE

## 2023-08-03 PROCEDURE — 3044F PR MOST RECENT HEMOGLOBIN A1C LEVEL <7.0%: ICD-10-PCS | Mod: CPTII,,, | Performed by: FAMILY MEDICINE

## 2023-08-03 PROCEDURE — 99214 OFFICE O/P EST MOD 30 MIN: CPT | Mod: ,,, | Performed by: FAMILY MEDICINE

## 2023-08-03 PROCEDURE — 99214 PR OFFICE/OUTPT VISIT, EST, LEVL IV, 30-39 MIN: ICD-10-PCS | Mod: ,,, | Performed by: FAMILY MEDICINE

## 2023-08-03 PROCEDURE — 3044F HG A1C LEVEL LT 7.0%: CPT | Mod: CPTII,,, | Performed by: FAMILY MEDICINE

## 2023-08-03 PROCEDURE — 1159F PR MEDICATION LIST DOCUMENTED IN MEDICAL RECORD: ICD-10-PCS | Mod: CPTII,,, | Performed by: FAMILY MEDICINE

## 2023-08-03 PROCEDURE — 3078F PR MOST RECENT DIASTOLIC BLOOD PRESSURE < 80 MM HG: ICD-10-PCS | Mod: CPTII,,, | Performed by: FAMILY MEDICINE

## 2023-08-03 PROCEDURE — 1159F MED LIST DOCD IN RCRD: CPT | Mod: CPTII,,, | Performed by: FAMILY MEDICINE

## 2023-08-03 PROCEDURE — 3078F DIAST BP <80 MM HG: CPT | Mod: CPTII,,, | Performed by: FAMILY MEDICINE

## 2023-08-03 PROCEDURE — 3074F SYST BP LT 130 MM HG: CPT | Mod: CPTII,,, | Performed by: FAMILY MEDICINE

## 2023-08-03 PROCEDURE — 3074F PR MOST RECENT SYSTOLIC BLOOD PRESSURE < 130 MM HG: ICD-10-PCS | Mod: CPTII,,, | Performed by: FAMILY MEDICINE

## 2023-08-03 NOTE — PROGRESS NOTES
Subjective:       Patient ID: Regina Brown is a 63 y.o. female.    Chief Complaint: Follow-up    Ms. Brown is a 63-year-old female who is here for a three-month follow-up.  She presented approximately 3 months ago for a routine visit as she would not seen a physician in over 30 years.  The appropriate labs were done she was found to have hypercholesterolemia and she was placed on rosuvastatin.  She was also referred to ortho for a mass on her left index finger.  This mass was subsequently excised.  Ms Brown is also allergic/non-tolerant to all opiates. She has nausea/vomiting/dizziness and delirium with opiates. She states she has smoked marijuana with her major surgeries and it helped a lot. She also was scheduled for a mammogram which was abnormal, and a six-month interval mammogram was recommended and is scheduled for 12/2023.    Ms Brown was referred to the smoking cessation clinic and she has not smoked a cigarette for 3 weeks        Review of Systems   Constitutional:  Negative for activity change, appetite change, chills, diaphoresis and fever.   HENT:  Negative for congestion, ear pain, postnasal drip, rhinorrhea and sore throat.    Eyes:  Negative for pain, discharge, redness and itching.   Respiratory:  Negative for cough and shortness of breath.    Cardiovascular:  Negative for chest pain, palpitations and leg swelling.   Gastrointestinal:  Negative for abdominal distention, abdominal pain, constipation, diarrhea and nausea.   Genitourinary:  Negative for difficulty urinating, dysuria, frequency and urgency.   Skin:  Negative for color change, rash and wound.   All other systems reviewed and are negative.      There is no problem list on file for this patient.      Objective:      Physical Exam  Constitutional:       Appearance: Normal appearance.   HENT:      Head: Normocephalic and atraumatic.      Nose: Nose normal.      Mouth/Throat:      Mouth: Mucous membranes are moist.   Eyes:      Pupils:  "Pupils are equal, round, and reactive to light.   Neck:      Vascular: Carotid bruit present.      Comments: Right carotid bruit  Cardiovascular:      Rate and Rhythm: Normal rate and regular rhythm.      Pulses: Normal pulses.      Heart sounds: Normal heart sounds.   Pulmonary:      Effort: Pulmonary effort is normal.      Breath sounds: Normal breath sounds.   Skin:     General: Skin is warm.   Neurological:      General: No focal deficit present.      Mental Status: She is alert and oriented to person, place, and time. Mental status is at baseline.   Psychiatric:         Mood and Affect: Mood normal.         Behavior: Behavior normal.         Lab Results   Component Value Date    WBC 5.17 05/09/2023    HGB 15.2 05/09/2023    HCT 47.2 05/09/2023     05/09/2023    CHOL 270 (H) 05/09/2023    TRIG 143 05/09/2023    HDL 49 05/09/2023    ALT 19 05/09/2023    AST 17 05/09/2023     05/09/2023    K 4.4 05/09/2023     05/09/2023    CREATININE 0.8 05/09/2023    BUN 9 05/09/2023    CO2 22 (L) 05/09/2023    TSH 1.682 05/09/2023    HGBA1C 5.0 05/09/2023     The 10-year ASCVD risk score (Sima RAYMOND, et al., 2019) is: 4.5%    Values used to calculate the score:      Age: 63 years      Sex: Female      Is Non- : No      Diabetic: No      Tobacco smoker: No      Systolic Blood Pressure: 118 mmHg      Is BP treated: No      HDL Cholesterol: 49 mg/dL      Total Cholesterol: 270 mg/dL  Visit Vitals  /78 (BP Location: Right arm, Patient Position: Sitting, BP Method: Medium (Manual))   Pulse 74   Resp 17   Ht 5' 6" (1.676 m)   Wt 81.4 kg (179 lb 7.3 oz)   SpO2 97%   BMI 28.96 kg/m²      Assessment:       1. Hypercholesteremia    2. Abnormal mammogram    3. Bruit of right carotid artery        Plan:       1. Hypercholesteremia  -     Lipid Panel; Future; Expected date: 08/03/2023  -     Ambulatory referral/consult to Cardiology; Future; Expected date: 08/10/2023    2. Abnormal " mammogram  -follow up mammo scheduled for 12/2023    3. Bruit of right carotid artery  -     Ambulatory referral/consult to Cardiology; Future; Expected date: 08/10/2023       Follow up in about 6 months (around 2/3/2024), or if symptoms worsen or fail to improve.      Future Appointments       Date Provider Specialty Appt Notes    8/8/2023 [facility]    8/15/2023 Khushboo Kyle [facility]    8/22/2023 Khushboo [facility]    8/29/2023 Khushboo Kyle [facility]

## 2023-08-04 ENCOUNTER — PATIENT MESSAGE (OUTPATIENT)
Dept: FAMILY MEDICINE | Facility: CLINIC | Age: 63
End: 2023-08-04
Payer: MEDICARE

## 2023-08-04 ENCOUNTER — LAB VISIT (OUTPATIENT)
Dept: LAB | Facility: CLINIC | Age: 63
End: 2023-08-04
Payer: MEDICARE

## 2023-08-04 DIAGNOSIS — E78.00 HYPERCHOLESTEREMIA: ICD-10-CM

## 2023-08-04 LAB
CHOLEST SERPL-MCNC: 164 MG/DL (ref 120–199)
CHOLEST/HDLC SERPL: 3.1 {RATIO} (ref 2–5)
HDLC SERPL-MCNC: 53 MG/DL (ref 40–75)
HDLC SERPL: 32.3 % (ref 20–50)
LDLC SERPL CALC-MCNC: 95.2 MG/DL (ref 63–159)
NONHDLC SERPL-MCNC: 111 MG/DL
TRIGL SERPL-MCNC: 79 MG/DL (ref 30–150)

## 2023-08-04 PROCEDURE — 80061 LIPID PANEL: CPT | Performed by: FAMILY MEDICINE

## 2023-08-07 NOTE — PROGRESS NOTES
Written by Lesly Pelayo MD on 8/4/2023  3:40 PM CDT  Seen by patient Regina Brown on 8/7/2023 11:52 AM

## 2023-08-07 NOTE — TELEPHONE ENCOUNTER
----- Message from Alanis Bae sent at 8/7/2023 10:45 AM CDT -----  Contact: Patient  Type:  Needs Medical Advice    Who Called: Patient     Pharmacy name and phone #:    [id number] - [place], [place] - [address]  [address]  [place] [address]  Phone: [phone] Fax: [phone]      Would the patient rather a call back or a response via [facility]? Call    Best Call Back Number: [phone] (home)     Additional Information: Patient needs to know if she will continue certain meds due to her test results. Please call to advise

## 2023-08-08 ENCOUNTER — CLINICAL SUPPORT (OUTPATIENT)
Dept: SMOKING CESSATION | Facility: CLINIC | Age: 63
End: 2023-08-08

## 2023-08-08 DIAGNOSIS — F17.210 CIGARETTE NICOTINE DEPENDENCE, UNCOMPLICATED: Primary | ICD-10-CM

## 2023-08-08 PROCEDURE — 99403 PR PREVENT COUNSEL,INDIV,45 MIN: ICD-10-PCS | Mod: ,,, | Performed by: NURSE PRACTITIONER

## 2023-08-08 PROCEDURE — 99999 PR PBB SHADOW E&M-EST. PATIENT-LVL II: ICD-10-PCS | Mod: PBBFAC,,,

## 2023-08-08 PROCEDURE — 99403 PREV MED CNSL INDIV APPRX 45: CPT | Mod: ,,, | Performed by: NURSE PRACTITIONER

## 2023-08-08 PROCEDURE — 99999 PR PBB SHADOW E&M-EST. PATIENT-LVL II: CPT | Mod: PBBFAC,,,

## 2023-08-08 RX ORDER — BUPROPION HYDROCHLORIDE 150 MG/1
150 TABLET, EXTENDED RELEASE ORAL 2 TIMES DAILY
Qty: 60 TABLET | Refills: 0 | Status: SHIPPED | OUTPATIENT
Start: 2023-08-08 | End: 2023-09-20

## 2023-08-08 RX ORDER — VARENICLINE TARTRATE 1 MG/1
1 TABLET, FILM COATED ORAL 2 TIMES DAILY
Qty: 56 TABLET | Refills: 0 | Status: SHIPPED | OUTPATIENT
Start: 2023-08-08 | End: 2023-09-20

## 2023-08-08 RX ORDER — ROSUVASTATIN CALCIUM 20 MG/1
20 TABLET, COATED ORAL DAILY
Qty: 90 TABLET | Refills: 3 | Status: SHIPPED | OUTPATIENT
Start: 2023-08-08 | End: 2023-11-09 | Stop reason: SDUPTHER

## 2023-08-08 NOTE — Clinical Note
Patient remains tobacco free since 7/1/23 but reports having strong urges for a cigarette. We discussed and reviewed strategies, cues, and triggers. Introduced the negative impact of tobacco on health, the health advantages of discontinuing the use of tobacco, time line improved health changes after a quit, withdrawal issues to expect from nicotine and habit, and ways to achieve the goal of a quit. The patient remains on the prescribed tobacco cessation medication regimen of 1 mg Chantix BID without any negative side effects at this time. Added 150 mg Wellbutrin SR BID per patient request. The patient denies any abnormal behavioral or mental changes at this time. The patient will continue with therapy sessions and medication monitoring by CTTS. Prescribed medication management will be by physician.

## 2023-08-08 NOTE — PROGRESS NOTES
Individual Follow-Up Form    8/8/2023    Quit Date: 7/1/23    Clinical Status of Patient: Outpatient    Length of Service: 45 minutes    Continuing Medication: yes  Chantix    Other Medications: none     Target Symptoms: Withdrawal and medication side effects. The following were rated moderate (3) to severe (4) on TCRS:  Moderate (3): none  Severe (4): none    Comments: #2 Patient remains tobacco free since 7/1/23 but reports having strong urges for a cigarette. Her spouse is trying to stop smoking too. We discussed and reviewed strategies, cues, and triggers. Introduced the negative impact of tobacco on health, the health advantages of discontinuing the use of tobacco, time line improved health changes after a quit, withdrawal issues to expect from nicotine and habit, and ways to achieve the goal of a quit. The patient remains on the prescribed tobacco cessation medication regimen of 1 mg Chantix BID without any negative side effects at this time. Added 150 mg Wellbutrin SR BID per patient request. The patient denies any abnormal behavioral or mental changes at this time. The patient will continue with therapy sessions and medication monitoring by CTTS. Prescribed medication management will be by physician.     Diagnosis: F17.210    Next Visit: 1 week

## 2023-08-15 ENCOUNTER — TELEPHONE (OUTPATIENT)
Dept: SMOKING CESSATION | Facility: CLINIC | Age: 63
End: 2023-08-15
Payer: MEDICARE

## 2023-08-15 ENCOUNTER — CLINICAL SUPPORT (OUTPATIENT)
Dept: SMOKING CESSATION | Facility: CLINIC | Age: 63
End: 2023-08-15

## 2023-08-15 DIAGNOSIS — F17.210 CIGARETTE NICOTINE DEPENDENCE, UNCOMPLICATED: Primary | ICD-10-CM

## 2023-08-15 PROCEDURE — 99999 PR PBB SHADOW E&M-EST. PATIENT-LVL II: ICD-10-PCS | Mod: PBBFAC,,,

## 2023-08-15 PROCEDURE — 99404 PREV MED CNSL INDIV APPRX 60: CPT | Mod: ,,, | Performed by: NURSE PRACTITIONER

## 2023-08-15 PROCEDURE — 99999 PR PBB SHADOW E&M-EST. PATIENT-LVL II: CPT | Mod: PBBFAC,,,

## 2023-08-15 PROCEDURE — 99404 PR PREVENT COUNSEL,INDIV,60 MIN: ICD-10-PCS | Mod: ,,, | Performed by: NURSE PRACTITIONER

## 2023-08-15 NOTE — Clinical Note
Patient reports remaining tobacco free since 7/1/23 after smoking 1 pack per day for 47 years. Her spouse was trying to stop smoking with her but started smoking again. I extended an offer for her to attend some of our group sessions. We discussed and reviewed strategies, cues, and triggers. Introduced the negative impact of tobacco on health, the health advantages of discontinuing the use of tobacco, time line improved health changes after a quit, withdrawal issues to expect from nicotine and habit, and ways to achieve the goal of a quit.The patient remains on the prescribed tobacco cessation medication regimen of 1 mg Chantix BID without any negative side effects at this time. The patient denies any abnormal behavioral or mental changes at this time.The patient will continue with therapy sessions and medication monitoring by CTTS. Prescribed medication management will be by physician.

## 2023-08-15 NOTE — PROGRESS NOTES
Individual Follow-Up Form    8/15/2023    Quit Date: 7/1/23    Clinical Status of Patient: Outpatient    Length of Service: 60 minutes    Continuing Medication: yes  Chantix or Wellbutrin    Other Medications: none     Target Symptoms: Withdrawal and medication side effects. The following were rated moderate (3) to severe (4) on TCRS:  Moderate (3): none  Severe (4): none    Comments: #2 Patient reports remaining tobacco free since 7/1/23 after smoking 1 pack per day for 47 years. Her spouse was trying to stop smoking with her but started smoking again. I extended an offer for her to attend some of our group sessions. We discussed and reviewed strategies, cues, and triggers. Introduced the negative impact of tobacco on health, the health advantages of discontinuing the use of tobacco, time line improved health changes after a quit, withdrawal issues to expect from nicotine and habit, and ways to achieve the goal of a quit.The patient remains on the prescribed tobacco cessation medication regimen of 1 mg Chantix BID without any negative side effects at this time. The patient denies any abnormal behavioral or mental changes at this time.The patient will continue with therapy sessions and medication monitoring by CTTS. Prescribed medication management will be by physician.     Diagnosis: F17.210    Next Visit: 1 week

## 2023-09-20 ENCOUNTER — OFFICE VISIT (OUTPATIENT)
Dept: OBSTETRICS AND GYNECOLOGY | Facility: CLINIC | Age: 63
End: 2023-09-20
Payer: MEDICARE

## 2023-09-20 VITALS
BODY MASS INDEX: 28.98 KG/M2 | WEIGHT: 180.31 LBS | SYSTOLIC BLOOD PRESSURE: 120 MMHG | HEIGHT: 66 IN | DIASTOLIC BLOOD PRESSURE: 70 MMHG

## 2023-09-20 DIAGNOSIS — Z01.419 WELL WOMAN EXAM WITH ROUTINE GYNECOLOGICAL EXAM: Primary | ICD-10-CM

## 2023-09-20 DIAGNOSIS — R01.1 HEART MURMUR: ICD-10-CM

## 2023-09-20 DIAGNOSIS — Z12.4 SCREENING FOR MALIGNANT NEOPLASM OF CERVIX: ICD-10-CM

## 2023-09-20 DIAGNOSIS — N95.1 MENOPAUSAL SYMPTOMS: ICD-10-CM

## 2023-09-20 PROCEDURE — 1159F MED LIST DOCD IN RCRD: CPT | Mod: CPTII,S$GLB,, | Performed by: STUDENT IN AN ORGANIZED HEALTH CARE EDUCATION/TRAINING PROGRAM

## 2023-09-20 PROCEDURE — 99386 PREV VISIT NEW AGE 40-64: CPT | Mod: S$GLB,,, | Performed by: STUDENT IN AN ORGANIZED HEALTH CARE EDUCATION/TRAINING PROGRAM

## 2023-09-20 PROCEDURE — 88141 PR  CYTOPATH CERV/VAG INTERPRET: ICD-10-PCS | Mod: ,,, | Performed by: PATHOLOGY

## 2023-09-20 PROCEDURE — 88175 CYTOPATH C/V AUTO FLUID REDO: CPT | Performed by: PATHOLOGY

## 2023-09-20 PROCEDURE — 87624 HPV HI-RISK TYP POOLED RSLT: CPT | Performed by: STUDENT IN AN ORGANIZED HEALTH CARE EDUCATION/TRAINING PROGRAM

## 2023-09-20 PROCEDURE — 99386 PR PREVENTIVE VISIT,NEW,40-64: ICD-10-PCS | Mod: S$GLB,,, | Performed by: STUDENT IN AN ORGANIZED HEALTH CARE EDUCATION/TRAINING PROGRAM

## 2023-09-20 PROCEDURE — 1160F PR REVIEW ALL MEDS BY PRESCRIBER/CLIN PHARMACIST DOCUMENTED: ICD-10-PCS | Mod: CPTII,S$GLB,, | Performed by: STUDENT IN AN ORGANIZED HEALTH CARE EDUCATION/TRAINING PROGRAM

## 2023-09-20 PROCEDURE — 3074F SYST BP LT 130 MM HG: CPT | Mod: CPTII,S$GLB,, | Performed by: STUDENT IN AN ORGANIZED HEALTH CARE EDUCATION/TRAINING PROGRAM

## 2023-09-20 PROCEDURE — 1159F PR MEDICATION LIST DOCUMENTED IN MEDICAL RECORD: ICD-10-PCS | Mod: CPTII,S$GLB,, | Performed by: STUDENT IN AN ORGANIZED HEALTH CARE EDUCATION/TRAINING PROGRAM

## 2023-09-20 PROCEDURE — 3044F PR MOST RECENT HEMOGLOBIN A1C LEVEL <7.0%: ICD-10-PCS | Mod: CPTII,S$GLB,, | Performed by: STUDENT IN AN ORGANIZED HEALTH CARE EDUCATION/TRAINING PROGRAM

## 2023-09-20 PROCEDURE — 1160F RVW MEDS BY RX/DR IN RCRD: CPT | Mod: CPTII,S$GLB,, | Performed by: STUDENT IN AN ORGANIZED HEALTH CARE EDUCATION/TRAINING PROGRAM

## 2023-09-20 PROCEDURE — 3008F BODY MASS INDEX DOCD: CPT | Mod: CPTII,S$GLB,, | Performed by: STUDENT IN AN ORGANIZED HEALTH CARE EDUCATION/TRAINING PROGRAM

## 2023-09-20 PROCEDURE — 88141 CYTOPATH C/V INTERPRET: CPT | Mod: ,,, | Performed by: PATHOLOGY

## 2023-09-20 PROCEDURE — 99999 PR PBB SHADOW E&M-EST. PATIENT-LVL III: CPT | Mod: PBBFAC,,, | Performed by: STUDENT IN AN ORGANIZED HEALTH CARE EDUCATION/TRAINING PROGRAM

## 2023-09-20 PROCEDURE — 99999 PR PBB SHADOW E&M-EST. PATIENT-LVL III: ICD-10-PCS | Mod: PBBFAC,,, | Performed by: STUDENT IN AN ORGANIZED HEALTH CARE EDUCATION/TRAINING PROGRAM

## 2023-09-20 PROCEDURE — 3078F PR MOST RECENT DIASTOLIC BLOOD PRESSURE < 80 MM HG: ICD-10-PCS | Mod: CPTII,S$GLB,, | Performed by: STUDENT IN AN ORGANIZED HEALTH CARE EDUCATION/TRAINING PROGRAM

## 2023-09-20 PROCEDURE — 3044F HG A1C LEVEL LT 7.0%: CPT | Mod: CPTII,S$GLB,, | Performed by: STUDENT IN AN ORGANIZED HEALTH CARE EDUCATION/TRAINING PROGRAM

## 2023-09-20 PROCEDURE — 3008F PR BODY MASS INDEX (BMI) DOCUMENTED: ICD-10-PCS | Mod: CPTII,S$GLB,, | Performed by: STUDENT IN AN ORGANIZED HEALTH CARE EDUCATION/TRAINING PROGRAM

## 2023-09-20 PROCEDURE — 3078F DIAST BP <80 MM HG: CPT | Mod: CPTII,S$GLB,, | Performed by: STUDENT IN AN ORGANIZED HEALTH CARE EDUCATION/TRAINING PROGRAM

## 2023-09-20 PROCEDURE — 3074F PR MOST RECENT SYSTOLIC BLOOD PRESSURE < 130 MM HG: ICD-10-PCS | Mod: CPTII,S$GLB,, | Performed by: STUDENT IN AN ORGANIZED HEALTH CARE EDUCATION/TRAINING PROGRAM

## 2023-09-20 NOTE — PROGRESS NOTES
"[facility]    Subjective:     Chief Complaint:   Chief Complaint   Patient presents with    Annual Exam       Patient's last menstrual period was No LMP recorded. Patient is postmenopausal.  Contraception: Postmenopausal.  HRT: None.    2023    Regina Brown is a 63 y.o. female  who presents for an annual exam.  She participates in regular exercise: yardwork.  She does smoke down to 4 cigarettes/day but is in smoking cessation.  She wears seatbelts.  She is not taking a multivitamin, vitamin D, and calcium.  She denies any domestic violence.    She reports hot flashes, night sweats, "crabby moods" and weight gain for the past few months.  She would like treatment for her hot flashes and night sweats.   Menopause at 58. Denies any vaginal bleeding or pelvic pain.    Last Pap: . Denies any history of abnormal pap smears.  Last mammogram: 6-15-23. Results: repeat imaging in 6 months.    FH:  Breast cancer: none.  Colon cancer: none.  Endometrial cancer: none.  Ovarian cancer: none.      OB History    Para Term  AB Living   3 3 3     3   SAB IAB Ectopic Multiple Live Births           3      # Outcome Date GA Lbr Kaveh/2nd Weight Sex Delivery Anes PTL Lv   3 Term     M Vag-Spont None  CY   2 Term     F Vag-Spont None  CY   1 Term     F Vag-Spont EPI  CY       Past Medical History:   Diagnosis Date    High cholesterol     HTN (hypertension)     Murmur, heart      Past Surgical History:   Procedure Laterality Date    APPENDECTOMY      ELBOW SURGERY Right     FINGER MASS EXCISION Left 2023    Procedure: EXCISION, MASS, FINGER;  Surgeon: Migel Richards MD;  Location: [facility];  Service: Orthopedics;  Laterality: Left;     Review of patient's allergies indicates:   Allergen Reactions    Aspirin      STOMACH BURNING    Codeine     Opioids-meperidine and related Rash       Social History     Socioeconomic History    Marital status: Single "   Tobacco Use    Smoking status: Former     Current packs/day: 0.00     Average packs/day: 1 pack/day for 47.5 years (47.5 ttl pk-yrs)     Types: Cigarettes     Start date: 1976     Quit date: 2023     Years since quittin.2     Passive exposure: Never    Smokeless tobacco: Never    Tobacco comments:     23 Active participant with smoking cessation. 23 QUIT SMOKING 23.    Substance and Sexual Activity    Alcohol use: No    Drug use: Yes     Types: Marijuana    Sexual activity: Yes     Partners: Male       History reviewed. No pertinent family history.    Medications  Current Outpatient Medications on File Prior to Visit   Medication Sig Dispense Refill Last Dose    acetaminophen (TYLENOL) 500 MG tablet Take 500 mg by mouth.   Taking    cyclobenzaprine (FLEXERIL) 10 MG tablet Take 1 tablet (10 mg total) by mouth 3 (three) times daily as needed for Muscle spasms. 45 tablet 2 Taking    rosuvastatin (CRESTOR) 20 MG tablet Take 1 tablet (20 mg total) by mouth once daily. 90 tablet 3 Taking    [DISCONTINUED] buPROPion (WELLBUTRIN SR) 150 MG TBSR 12 hr tablet Take 1 tablet (150 mg total) by mouth 2 (two) times daily. 60 tablet 0     [DISCONTINUED] varenicline (CHANTIX) 1 mg Tab Take 1 tablet (1 mg total) by mouth 2 (two) times daily. 56 tablet 0        Review of Systems   Constitutional: Negative for appetite change, fever and unexpected weight change.   Respiratory: Negative for cough and shortness of breath.    Cardiovascular: Negative for chest pain and palpitations.   Gastrointestinal: Negative for abdominal distention, constipation, nausea and vomiting.   Genitourinary: Negative for dyspareunia, dysuria, hematuria and pelvic pain.        GYN ROS per HPI.   Musculoskeletal: Negative for gait problem and myalgias.   Skin: Negative for rash.   Neurological: Negative for dizziness, light-headedness and headaches.   Psychiatric/Behavioral: The patient is not nervous/anxious.      Objective:     BP  "120/70 (BP Location: Left arm, Patient Position: Sitting, BP Method: Medium (Manual))   Ht 5' 6" (1.676 m)   Wt 81.8 kg (180 lb 5.4 oz)   BMI 29.11 kg/m²     Physical Exam  Exam conducted with a chaperone present.   Constitutional:       General: She is not in acute distress.  HENT:      Head: Normocephalic.   Eyes:      General: No scleral icterus.  Cardiovascular:      Rate and Rhythm: Normal rate.      Heart sounds: Murmur heard.   Pulmonary:      Effort: Pulmonary effort is normal. No respiratory distress.   Genitourinary:     General: Normal vulva.      Pubic Area: No rash.       Labia:         Right: No rash or tenderness.         Left: No rash or tenderness.       Vagina: No tenderness or bleeding.      Cervix: Cervical bleeding (Some oozing outside of cervical os) present. No cervical motion tenderness.      Uterus: Not tender.       Adnexa:         Right: No mass or tenderness.          Left: No mass or tenderness.           Neurological:      General: No focal deficit present.      Mental Status: She is alert.   Psychiatric:         Mood and Affect: Mood normal.         Assessment:     1. Well woman exam with routine gynecological exam    2. Screening for malignant neoplasm of cervix    3. Menopausal symptoms    4. Heart murmur      Plan:     63 y.o. female presents today for her annual exam.     1. Well woman exam with routine gynecological exam    2. Screening for malignant neoplasm of cervix  - Liquid-Based Pap Smear, Screening  - HPV High Risk Genotypes, PCR    3. Menopausal symptoms  - fezolinetant 45 mg Tab; Take 45 mg by mouth once daily.  Dispense: 90 tablet; Refill: 0    4. Heart murmur      Follow up for evaluation with Dr. Fragoso.       The above was reviewed and discussed with the patient.    Annual exam and screening issues based on the patient's age and family history were discussed.     Discussed physical exam findings with the patient. We discussed either performing a biopsy vs " awaiting pap smear results. We decided to wait for pap smear results. I suspect she will need a colposcopy. There was some blood oozing out of the cervical os. No cervical ectropion noted. A 0.5 cm circular lesion was noticed on cervix around 2:00. Lesion was not tender, but slightly friable. I recommended she go ahead and schedule a follow up with Dr. Fragoso for possible colposcopy.     Discussed that menopausal symptoms occur due to lower estrogen levels in the body.  Symptoms include but are not limited to hot flashes, night sweats, sleep disturbances, weight gain, and irregular cycles/menstrual changes.  Labs would not provide additional information since she is in menopause. Recent TSH 5-9-2023 was normal. We discussed conservative versus medical management in regards to hot flashes.  Options included HRT, Veozah, gabapentin, antidepressants and plant based options.    At this time, patient elects to try Veozah. She was provided with ordering information.     Discussed the presence of a mild heart murmur. She reports the murmur has been present for years and she has seen a cardiologist about it. Denies any chest pain, palpitations, SOB, ALBA, diaphoresis, trouble swallowing, extreme headache, nausea, vomiting or numbness/tingling in her extremities. I recommended her going to see a cardiologist if she has any issues with the murmur.      - Pap and HPV testing performed.   - Mammogram 6-15-23. Repeat imaging in 6 months. Orders placed by PCP.   - Colonoscopy pt did FIT KIT but results were not received. Recommended following up with PCP.   - Tobacco cessation: in classes now.    - DEXA N/A.  - Counseled to take daily multivitamin. If patient is of reproductive age and not on contraception, to take prenatal vitamin. Patient has been counseled on the vitamin D and calcium requirements per ACOG recommendations.    Age         Calcium(mg/day)       Vitamin D (IU/day)  9-18                1300                        600  19-50              1000                       600  51-70              1200                       600  >70                  1200                       800    [facility].     The patient's questions were answered, and she agrees with the current plan.      The patient was counseled today on the new ACS guidelines for cervical cytology screening as well as the current recommendations for breast cancer screening. She was counseled to follow up with her PCP for other routine health maintenance.      Miriam Hardwick PA-C  09/20/2023

## 2023-09-26 LAB
HPV HR 12 DNA SPEC QL NAA+PROBE: NEGATIVE
HPV16 AG SPEC QL: POSITIVE
HPV18 DNA SPEC QL NAA+PROBE: NEGATIVE

## 2023-09-27 LAB
FINAL PATHOLOGIC DIAGNOSIS: ABNORMAL
Lab: ABNORMAL

## 2023-09-28 ENCOUNTER — TELEPHONE (OUTPATIENT)
Dept: OBSTETRICS AND GYNECOLOGY | Facility: CLINIC | Age: 63
End: 2023-09-28
Payer: MEDICARE

## 2023-09-28 NOTE — TELEPHONE ENCOUNTER
----- Message from Miriam Hardwick PA-C sent at 9/27/2023  4:45 PM CDT -----  Your pap smear was slightly abnormal and you tested positive for high risk HPV type 16. The recommendation is to get a closer look at the cervix with a procedure called a colposcopy. This is an office based procedure with one of my collaborating physicians.   Keep follow up scheduled Dr. Fragoso.  Please change note in computer to reflect the need for colposcopy

## 2023-10-04 ENCOUNTER — OFFICE VISIT (OUTPATIENT)
Dept: OBSTETRICS AND GYNECOLOGY | Facility: CLINIC | Age: 63
End: 2023-10-04
Payer: MEDICARE

## 2023-10-04 ENCOUNTER — CLINICAL SUPPORT (OUTPATIENT)
Dept: SMOKING CESSATION | Facility: CLINIC | Age: 63
End: 2023-10-04

## 2023-10-04 VITALS
RESPIRATION RATE: 16 BRPM | HEIGHT: 66 IN | BODY MASS INDEX: 28.77 KG/M2 | SYSTOLIC BLOOD PRESSURE: 144 MMHG | WEIGHT: 179 LBS | DIASTOLIC BLOOD PRESSURE: 90 MMHG

## 2023-10-04 DIAGNOSIS — F17.200 NICOTINE DEPENDENCE: Primary | ICD-10-CM

## 2023-10-04 DIAGNOSIS — R87.611 PAP SMEAR OF CERVIX WITH ASCUS, CANNOT EXCLUDE HGSIL: Primary | ICD-10-CM

## 2023-10-04 DIAGNOSIS — B97.7 HPV IN FEMALE: ICD-10-CM

## 2023-10-04 PROCEDURE — 3008F BODY MASS INDEX DOCD: CPT | Mod: CPTII,S$GLB,, | Performed by: OBSTETRICS & GYNECOLOGY

## 2023-10-04 PROCEDURE — 99407 BEHAV CHNG SMOKING > 10 MIN: CPT | Mod: S$GLB,,,

## 2023-10-04 PROCEDURE — 57454 PR COLPOSC,CERVIX W/ADJ VAG,W/BX & CURRETAG: ICD-10-PCS | Mod: S$GLB,,, | Performed by: OBSTETRICS & GYNECOLOGY

## 2023-10-04 PROCEDURE — 57454 BX/CURETT OF CERVIX W/SCOPE: CPT | Mod: S$GLB,,, | Performed by: OBSTETRICS & GYNECOLOGY

## 2023-10-04 PROCEDURE — 99999 PR PBB SHADOW E&M-EST. PATIENT-LVL I: ICD-10-PCS | Mod: PBBFAC,,,

## 2023-10-04 PROCEDURE — 88305 TISSUE EXAM BY PATHOLOGIST: CPT | Mod: 59 | Performed by: PATHOLOGY

## 2023-10-04 PROCEDURE — 88342 CHG IMMUNOCYTOCHEMISTRY: ICD-10-PCS | Mod: 26,,, | Performed by: PATHOLOGY

## 2023-10-04 PROCEDURE — 88305 TISSUE EXAM BY PATHOLOGIST: ICD-10-PCS | Mod: 26,,, | Performed by: PATHOLOGY

## 2023-10-04 PROCEDURE — 1159F MED LIST DOCD IN RCRD: CPT | Mod: CPTII,S$GLB,, | Performed by: OBSTETRICS & GYNECOLOGY

## 2023-10-04 PROCEDURE — 88342 IMHCHEM/IMCYTCHM 1ST ANTB: CPT | Mod: 26,,, | Performed by: PATHOLOGY

## 2023-10-04 PROCEDURE — 3008F PR BODY MASS INDEX (BMI) DOCUMENTED: ICD-10-PCS | Mod: CPTII,S$GLB,, | Performed by: OBSTETRICS & GYNECOLOGY

## 2023-10-04 PROCEDURE — 99999 PR PBB SHADOW E&M-EST. PATIENT-LVL I: CPT | Mod: PBBFAC,,,

## 2023-10-04 PROCEDURE — 88305 TISSUE EXAM BY PATHOLOGIST: CPT | Mod: 26,,, | Performed by: PATHOLOGY

## 2023-10-04 PROCEDURE — 3077F PR MOST RECENT SYSTOLIC BLOOD PRESSURE >= 140 MM HG: ICD-10-PCS | Mod: CPTII,S$GLB,, | Performed by: OBSTETRICS & GYNECOLOGY

## 2023-10-04 PROCEDURE — 99214 PR OFFICE/OUTPT VISIT, EST, LEVL IV, 30-39 MIN: ICD-10-PCS | Mod: 25,S$GLB,, | Performed by: OBSTETRICS & GYNECOLOGY

## 2023-10-04 PROCEDURE — 3080F DIAST BP >= 90 MM HG: CPT | Mod: CPTII,S$GLB,, | Performed by: OBSTETRICS & GYNECOLOGY

## 2023-10-04 PROCEDURE — 1159F PR MEDICATION LIST DOCUMENTED IN MEDICAL RECORD: ICD-10-PCS | Mod: CPTII,S$GLB,, | Performed by: OBSTETRICS & GYNECOLOGY

## 2023-10-04 PROCEDURE — 99999 PR PBB SHADOW E&M-EST. PATIENT-LVL III: ICD-10-PCS | Mod: PBBFAC,,, | Performed by: OBSTETRICS & GYNECOLOGY

## 2023-10-04 PROCEDURE — 3044F PR MOST RECENT HEMOGLOBIN A1C LEVEL <7.0%: ICD-10-PCS | Mod: CPTII,S$GLB,, | Performed by: OBSTETRICS & GYNECOLOGY

## 2023-10-04 PROCEDURE — 3077F SYST BP >= 140 MM HG: CPT | Mod: CPTII,S$GLB,, | Performed by: OBSTETRICS & GYNECOLOGY

## 2023-10-04 PROCEDURE — 3080F PR MOST RECENT DIASTOLIC BLOOD PRESSURE >= 90 MM HG: ICD-10-PCS | Mod: CPTII,S$GLB,, | Performed by: OBSTETRICS & GYNECOLOGY

## 2023-10-04 PROCEDURE — 88342 IMHCHEM/IMCYTCHM 1ST ANTB: CPT | Mod: 59 | Performed by: PATHOLOGY

## 2023-10-04 PROCEDURE — 99407 PR TOBACCO USE CESSATION INTENSIVE >10 MINUTES: ICD-10-PCS | Mod: S$GLB,,,

## 2023-10-04 PROCEDURE — 99214 OFFICE O/P EST MOD 30 MIN: CPT | Mod: 25,S$GLB,, | Performed by: OBSTETRICS & GYNECOLOGY

## 2023-10-04 PROCEDURE — 3044F HG A1C LEVEL LT 7.0%: CPT | Mod: CPTII,S$GLB,, | Performed by: OBSTETRICS & GYNECOLOGY

## 2023-10-04 PROCEDURE — 99999 PR PBB SHADOW E&M-EST. PATIENT-LVL III: CPT | Mod: PBBFAC,,, | Performed by: OBSTETRICS & GYNECOLOGY

## 2023-10-04 NOTE — PROGRESS NOTES
Called pt to f/u on her 3 month smoking cessation quit status. Pt stated she is still smoking, but is down to 4 cpd and still working on her quit. Informed her of benefit period, phone follow ups, and contact information. Will complete smart form and will continue to follow up on quit #1 episode.

## 2023-10-04 NOTE — PROGRESS NOTES
Subjective:   Chief Complaint:  Abnormal Pap Smear       Patient ID: Regina Brown is a  63 y.o. female.    Date: 10/04/2023     HRT: None    She was recently seen by ADRIANA Sahu for her annual exam. [as the patient is new to me her previous office note and chart were reviewed.]  She reports no gynecologic evaluation or Pap smear since .    She presents today due to a recent abnormal Pap smear.    Her Pap smear from 2023 noted atypical squamous cells can not exclude a high-grade intraepithelial lesion. / HPV was positive for high-risk type 16.  Negative for type 18..    History of previous abnormal Pap smears:  Negative    GYN & OB History  No LMP recorded. Patient is postmenopausal.     OB History    Para Term  AB Living   3 3 3     3   SAB IAB Ectopic Multiple Live Births           3      # Outcome Date GA Lbr Kaveh/2nd Weight Sex Delivery Anes PTL Lv   3 Term     M Vag-Spont None  CY   2 Term     F Vag-Spont None  CY   1 Term     F Vag-Spont EPI  CY      Obstetric Comments   Vaginal delivery x 3       Past Medical History:   Diagnosis Date    Abnormal Pap smear of cervix     High cholesterol     HTN (hypertension)     Murmur, heart         Past Surgical History:   Procedure Laterality Date    APPENDECTOMY      ELBOW SURGERY Right     FINGER MASS EXCISION Left 2023    Procedure: EXCISION, MASS, FINGER;  Surgeon: Migel Richards MD;  Location: [facility];  Service: Orthopedics;  Laterality: Left;        Review of Systems  Review of Systems   Constitutional:  Negative for fever and unexpected weight change.   Respiratory: Negative.     Cardiovascular:  Negative for chest pain and palpitations.   Gastrointestinal:  Negative for nausea and vomiting.   Genitourinary:  Negative for dysuria, hematuria and urgency.        Gyn as per HPI   Neurological:  Negative for headaches.           Objective:      Vitals:    10/04/23 0943   BP: (!) 144/90   Resp: 16        Physical Exam:   Constitutional: She appears well-developed and well-nourished.    HENT:   Head: Normocephalic.     Neck: No thyroid mass present.    Cardiovascular:  Normal rate.             Pulmonary/Chest: Effort normal. No respiratory distress.        Abdominal: Soft. There is no abdominal tenderness.     Genitourinary: Vaginal atrophy noted.                 Musculoskeletal: Normal range of motion.      Right lower leg: No edema.      Left lower leg: No edema.       Neurological: She is alert.   No gross lesions noted.    Skin: Skin is warm and dry.    Psychiatric: She has a normal mood and affect. Her speech is normal and behavior is normal. Mood normal.         Pre-Procedure Time Out  Patient identification confirmed.  The planned procedure and procedure site were discussed.  The pros, cons, risks, benefits, alternatives, and indications of the office procedure were discussed in detail with the patient.  We discussed the possibility of allergic reactions, bleeding, infection, and other issues unique to this procedure were discussed.    All participating parties are in agreed with the current procedure plan.  Verbal consent from the patient was obtained.       COLPOSCOPY:    PRE-COLPOSCOPY PROCEDURE COUNSELING:  Prior to the procedure, the significance of the patient's abnormal pap test findings, HPV, the need for colposcopy and possible biopsies to determine a diagnosis and plan of care, treatments available, the minimal risks of bleeding and infection with a colposcopy, alternatives to colposcopy were reviewed and discussed with the patient.  Her questions were answered and she is in agreement with the current plan.    Procedure:  A speculum was placed and the cervix and vaginal were completely visualized.  Atrophic changes seen otherwise no abnormalities of the vaginal vault were noted.      The transformation zone clearly visualized.   The green filter activated: vascular abnormalities: not  seen    Green filter disengaged and acetic acid was applied to the cervix in the usual fashion:  AcetoWhite epithelium seen as per diagram.  Biopsy performed as per diagram.  An ECC performed.    Hemostasis was obtained with silver nitrate and direct pressure.  The speculum was removed.  The patient tolerated the procedure well.      Assessment:        1. Pap smear of cervix with ASCUS, cannot exclude HGSIL    2. HPV in female         Plan:      Pap smear of cervix with ASCUS, cannot exclude HGSIL  -     Specimen to Pathology, Ob/Gyn    HPV in female  -     Specimen to Pathology, Ob/Gyn       Follow up for As needed based on the results of today's testing..     The above was reviewed and discussed with the patient.    Prior to the colposcopy we discussed the significance of her Pap smear abnormality (ASCUS can not rule out high-grade lesion with positive high-risk HPV type 16).  She tolerated the colposcopy without difficulty.    POST COLPOSCOPY COUNSELING:   Manage post colposcopy cramping with NSAIDs and Tylenol  Avoid anything in vagina (intercourse, douching, tampons) one week after the procedure.  Expect a vaginal discharge for several days.  Report bleeding heavier than a period, worsening pain, fever > 101.0 F, or foul-smelling vaginal discharge.    Findings on colposcopy reviewed discussed.  We will base further evaluation treatment on results of today's pathology     The patient's questions regarding the above were answered and she is in agreement with the current plan.    Phoenix Fragoso MD  Department OBGYN [facility]

## 2023-10-10 LAB
FINAL PATHOLOGIC DIAGNOSIS: NORMAL
GROSS: NORMAL
Lab: NORMAL
MICROSCOPIC EXAM: NORMAL

## 2023-10-10 NOTE — PROGRESS NOTES
Contact this patient and schedule for office appointment to discuss results of cervical biopsy and further treatment options

## 2023-10-19 ENCOUNTER — OFFICE VISIT (OUTPATIENT)
Dept: OBSTETRICS AND GYNECOLOGY | Facility: CLINIC | Age: 63
End: 2023-10-19
Payer: MEDICARE

## 2023-10-19 VITALS
BODY MASS INDEX: 28.03 KG/M2 | RESPIRATION RATE: 12 BRPM | SYSTOLIC BLOOD PRESSURE: 120 MMHG | WEIGHT: 174.38 LBS | DIASTOLIC BLOOD PRESSURE: 100 MMHG | HEIGHT: 66 IN

## 2023-10-19 DIAGNOSIS — R87.611 PAP SMEAR OF CERVIX WITH ASCUS, CANNOT EXCLUDE HGSIL: ICD-10-CM

## 2023-10-19 DIAGNOSIS — B97.7 HPV IN FEMALE: ICD-10-CM

## 2023-10-19 DIAGNOSIS — N87.1 HIGH GRADE SQUAMOUS INTRAEPITHELIAL LESION (HGSIL), GRADE 2 CIN, ON BIOPSY OF CERVIX: ICD-10-CM

## 2023-10-19 DIAGNOSIS — D06.9 HIGH GRADE SQUAMOUS INTRAEPITHELIAL LESION (HGSIL), GRADE 3 CIN, ON BIOPSY OF CERVIX: Primary | ICD-10-CM

## 2023-10-19 PROCEDURE — 99214 OFFICE O/P EST MOD 30 MIN: CPT | Mod: S$GLB,,, | Performed by: OBSTETRICS & GYNECOLOGY

## 2023-10-19 PROCEDURE — 3044F HG A1C LEVEL LT 7.0%: CPT | Mod: CPTII,S$GLB,, | Performed by: OBSTETRICS & GYNECOLOGY

## 2023-10-19 PROCEDURE — 1159F MED LIST DOCD IN RCRD: CPT | Mod: CPTII,S$GLB,, | Performed by: OBSTETRICS & GYNECOLOGY

## 2023-10-19 PROCEDURE — 3074F SYST BP LT 130 MM HG: CPT | Mod: CPTII,S$GLB,, | Performed by: OBSTETRICS & GYNECOLOGY

## 2023-10-19 PROCEDURE — 1159F PR MEDICATION LIST DOCUMENTED IN MEDICAL RECORD: ICD-10-PCS | Mod: CPTII,S$GLB,, | Performed by: OBSTETRICS & GYNECOLOGY

## 2023-10-19 PROCEDURE — 3044F PR MOST RECENT HEMOGLOBIN A1C LEVEL <7.0%: ICD-10-PCS | Mod: CPTII,S$GLB,, | Performed by: OBSTETRICS & GYNECOLOGY

## 2023-10-19 PROCEDURE — 3074F PR MOST RECENT SYSTOLIC BLOOD PRESSURE < 130 MM HG: ICD-10-PCS | Mod: CPTII,S$GLB,, | Performed by: OBSTETRICS & GYNECOLOGY

## 2023-10-19 PROCEDURE — 3080F DIAST BP >= 90 MM HG: CPT | Mod: CPTII,S$GLB,, | Performed by: OBSTETRICS & GYNECOLOGY

## 2023-10-19 PROCEDURE — 99999 PR PBB SHADOW E&M-EST. PATIENT-LVL IV: ICD-10-PCS | Mod: PBBFAC,,, | Performed by: OBSTETRICS & GYNECOLOGY

## 2023-10-19 PROCEDURE — 3008F BODY MASS INDEX DOCD: CPT | Mod: CPTII,S$GLB,, | Performed by: OBSTETRICS & GYNECOLOGY

## 2023-10-19 PROCEDURE — 99999 PR PBB SHADOW E&M-EST. PATIENT-LVL IV: CPT | Mod: PBBFAC,,, | Performed by: OBSTETRICS & GYNECOLOGY

## 2023-10-19 PROCEDURE — 3080F PR MOST RECENT DIASTOLIC BLOOD PRESSURE >= 90 MM HG: ICD-10-PCS | Mod: CPTII,S$GLB,, | Performed by: OBSTETRICS & GYNECOLOGY

## 2023-10-19 PROCEDURE — 99214 PR OFFICE/OUTPT VISIT, EST, LEVL IV, 30-39 MIN: ICD-10-PCS | Mod: S$GLB,,, | Performed by: OBSTETRICS & GYNECOLOGY

## 2023-10-19 PROCEDURE — 3008F PR BODY MASS INDEX (BMI) DOCUMENTED: ICD-10-PCS | Mod: CPTII,S$GLB,, | Performed by: OBSTETRICS & GYNECOLOGY

## 2023-10-19 NOTE — PROGRESS NOTES
Subjective:   Chief Complaint:  Follow-up (Colpo follow up)       Patient ID: Regina Brown is a  63 y.o. female.    Date: 10/21/2023     HRT: None    She was recently seen by ADRIANA Sahu for her annual exam. [as the patient is new to me her previous office note and chart were reviewed.]  She reports no gynecologic evaluation or Pap smear since .    She presents today due to a recent abnormal Pap smear.    Her Pap smear from 2023 noted atypical squamous cells can not exclude a high-grade intraepithelial lesion. / HPV was positive for high-risk type 16.  Negative for type 18..    History of previous abnormal Pap smears:  Negative    Date: 10/19/2023    The patient presents today for follow-up.  She was last seen as above, time she underwent colposcopic evaluation due to an abnormal Pap smear.    She presents today to discuss results her pathology as well as further evaluation and treatment options.    Final Pathologic Diagnosis 1.  Endocervical curettage: Fragments of ectocervix with severe squamous dysplasia (GIA 3/HSIL) admixed with scant benign endocervix   2. Cervix at 5, biopsy:     Severe squamous dysplasia (GIA 3/HSIL)      GYN & OB History  No LMP recorded. Patient is postmenopausal.     OB History    Para Term  AB Living   3 3 3     3   SAB IAB Ectopic Multiple Live Births           3      # Outcome Date GA Lbr Kaveh/2nd Weight Sex Delivery Anes PTL Lv   3 Term     M Vag-Spont None  CY   2 Term     F Vag-Spont None  CY   1 Term     F Vag-Spont EPI  CY      Obstetric Comments   Vaginal delivery x 3       Past Medical History:   Diagnosis Date    Abnormal Pap smear of cervix     High cholesterol     HTN (hypertension)     Murmur, heart         Past Surgical History:   Procedure Laterality Date    APPENDECTOMY  1976    ELBOW SURGERY Right 1998    FINGER MASS EXCISION Left 2023    Procedure: EXCISION, MASS, FINGER;  Surgeon: Migel Richards MD;  Location:  [facility];  Service: Orthopedics;  Laterality: Left;        Review of Systems  Review of Systems   Constitutional:  Negative for fever and unexpected weight change.   Respiratory: Negative.     Cardiovascular:  Negative for chest pain and palpitations.   Gastrointestinal:  Negative for nausea and vomiting.   Genitourinary:  Negative for dysuria, hematuria and urgency.        Gyn as per HPI   Neurological:  Negative for headaches.           Objective:      Vitals:    10/19/23 1043   BP: (!) 120/100   Resp: 12       Physical Exam:   Constitutional: She appears well-developed and well-nourished.    HENT:   Head: Normocephalic.     Neck: No thyroid mass present.    Cardiovascular:  Normal rate.             Pulmonary/Chest: Effort normal. No respiratory distress.        Abdominal: Soft. There is no abdominal tenderness.     Genitourinary: Vaginal atrophy noted.                 Musculoskeletal: Normal range of motion.      Right lower leg: No edema.      Left lower leg: No edema.       Neurological: She is alert.   No gross lesions noted.    Skin: Skin is warm and dry.    Psychiatric: She has a normal mood and affect. Her speech is normal and behavior is normal. Mood normal.       Pap smear from 9/20/2023 noted atypical squamous cells can not exclude a high-grade intraepithelial lesion. / HPV was positive for high-risk type 16.  Negative for type 18..    Final Pathologic Diagnosis 1.  Endocervical curettage: Fragments of ectocervix with severe squamous dysplasia (GIA 3/HSIL) admixed with scant benign endocervix   2. Cervix at 5, biopsy:     Severe squamous dysplasia (GIA 3/HSIL)        Assessment:        1. High grade squamous intraepithelial lesion (HGSIL), grade 3 GIA, on biopsy of cervix    2. Pap smear of cervix with ASCUS, cannot exclude HGSIL    3. HPV in female    4. High grade squamous intraepithelial lesion (HGSIL), grade 2 GIA, on biopsy of cervix         Plan:      High grade squamous intraepithelial lesion  (HGSIL), grade 3 GIA, on biopsy of cervix  -     Place in Outpatient; Standing  -     Case Request Operating Room: LEEP CONIZATION, CERVIX AND OTHER INDICATED PROCEDURES  -     Full code; Standing  -     Vital signs; Standing  -     Bed rest with bathroom privileges; Standing  -     Insert peripheral IV; Standing  -     Notify Physician/Vital Signs Parameters Urine output less than 0.5mL/kg/hr (with indwelling catheter) or 30 mL/hr (without indwelling catheter) or blood glucose greater than 200 mg/dL; Standing  -     Notify physician; Standing  -     Notify Physician - Potential Need of Opioid Reversal; Standing  -     Diet NPO; Standing  -     Place sequential compression device; Standing    Pap smear of cervix with ASCUS, cannot exclude HGSIL    HPV in female    High grade squamous intraepithelial lesion (HGSIL), grade 2 GIA, on biopsy of cervix    Other orders  -     0.9%  NaCl infusion  -     IP VTE LOW RISK PATIENT; Standing  -     mupirocin 2 % ointment       Follow up for As needed or 2 weeks following surgery.     The above was reviewed and discussed with the patient.    We discussed the results of her Pap smear from 9/20/2023 noted atypical squamous cells can not exclude a high-grade intraepithelial lesion. / HPV was positive for high-risk type 16.  Negative for type 18.  And the findings of severe dysplasia, GIA three/HGSIL on both cervical biopsy and endocervical curettage.    Conservative, lack of med medical, and surgical interventions were discussed, and the patient would like to proceed with surgical intervention.  She will be scheduled for a LEEP CONIZATION OF THE CERVIX AND OTHER INDICATED PROCEDURES    The pros, cons, risks, benefits, alternatives, and indications of the surgical procedure were discussed in detail with the patient.  We discussed the possibility of allergic reactions, bleeding, infection damage to cervix, uterus, bladder, ureters, bowel, and other abdominal pelvic organs.  Issues  unique to this procedure were discussed.    The patient's questions regarding above were answered and she agrees with this plan.  The patient was provided with the informed consent form and given times reviewed the form.  Questions were answered and consent was obtained    Phoenix Fragoso MD  Department OBGYN [facility]

## 2023-10-21 RX ORDER — SODIUM CHLORIDE 9 MG/ML
INJECTION, SOLUTION INTRAVENOUS CONTINUOUS
Status: CANCELLED | OUTPATIENT
Start: 2023-10-21

## 2023-10-21 RX ORDER — MUPIROCIN 20 MG/G
OINTMENT TOPICAL
Status: CANCELLED | OUTPATIENT
Start: 2023-10-21

## 2023-11-09 DIAGNOSIS — E78.00 HYPERCHOLESTEREMIA: ICD-10-CM

## 2023-11-09 RX ORDER — ROSUVASTATIN CALCIUM 20 MG/1
20 TABLET, COATED ORAL DAILY
Qty: 90 TABLET | Refills: 3 | Status: SHIPPED | OUTPATIENT
Start: 2023-11-09 | End: 2024-11-08

## 2023-11-16 ENCOUNTER — TELEPHONE (OUTPATIENT)
Dept: OBSTETRICS AND GYNECOLOGY | Facility: CLINIC | Age: 63
End: 2023-11-16
Payer: MEDICARE

## 2023-11-16 NOTE — TELEPHONE ENCOUNTER
Contacted pt and notified of procedure date of 11/27/23. Post-op appt scheduled and questions answered. Pt voiced understanding.

## 2023-11-26 ENCOUNTER — ANESTHESIA EVENT (OUTPATIENT)
Dept: SURGERY | Facility: HOSPITAL | Age: 63
End: 2023-11-26
Payer: MEDICARE

## 2023-11-27 ENCOUNTER — ANESTHESIA (OUTPATIENT)
Dept: SURGERY | Facility: HOSPITAL | Age: 63
End: 2023-11-27
Payer: MEDICARE

## 2023-11-27 ENCOUNTER — TELEPHONE (OUTPATIENT)
Dept: OBSTETRICS AND GYNECOLOGY | Facility: CLINIC | Age: 63
End: 2023-11-27
Payer: MEDICARE

## 2023-11-27 ENCOUNTER — HOSPITAL ENCOUNTER (OUTPATIENT)
Facility: HOSPITAL | Age: 63
Discharge: HOME | End: 2023-11-27
Attending: OBSTETRICS & GYNECOLOGY | Admitting: OBSTETRICS & GYNECOLOGY
Payer: MEDICARE

## 2023-11-27 DIAGNOSIS — N87.1 HIGH GRADE SQUAMOUS INTRAEPITHELIAL LESION (HGSIL), GRADE 2 CIN, ON BIOPSY OF CERVIX: ICD-10-CM

## 2023-11-27 DIAGNOSIS — D06.9 HIGH GRADE SQUAMOUS INTRAEPITHELIAL LESION (HGSIL), GRADE 3 CIN, ON BIOPSY OF CERVIX: Primary | ICD-10-CM

## 2023-11-27 PROCEDURE — D9220A PRA ANESTHESIA: ICD-10-PCS | Mod: ANES,,, | Performed by: ANESTHESIOLOGY

## 2023-11-27 PROCEDURE — D9220A PRA ANESTHESIA: Mod: ANES,,, | Performed by: ANESTHESIOLOGY

## 2023-11-27 PROCEDURE — 63600175 PHARM REV CODE 636 W HCPCS: Performed by: STUDENT IN AN ORGANIZED HEALTH CARE EDUCATION/TRAINING PROGRAM

## 2023-11-27 PROCEDURE — 25000003 PHARM REV CODE 250: Performed by: STUDENT IN AN ORGANIZED HEALTH CARE EDUCATION/TRAINING PROGRAM

## 2023-11-27 PROCEDURE — 25000003 PHARM REV CODE 250: Performed by: ANESTHESIOLOGY

## 2023-11-27 PROCEDURE — 57522 PR CONIZATION CERVIX,LOOP ELECTRD: ICD-10-PCS | Mod: ,,, | Performed by: OBSTETRICS & GYNECOLOGY

## 2023-11-27 PROCEDURE — 36000706: Performed by: OBSTETRICS & GYNECOLOGY

## 2023-11-27 PROCEDURE — 25000003 PHARM REV CODE 250: Performed by: OBSTETRICS & GYNECOLOGY

## 2023-11-27 PROCEDURE — 37000009 HC ANESTHESIA EA ADD 15 MINS: Performed by: OBSTETRICS & GYNECOLOGY

## 2023-11-27 PROCEDURE — 63600175 PHARM REV CODE 636 W HCPCS: Performed by: ANESTHESIOLOGY

## 2023-11-27 PROCEDURE — 27200651 HC AIRWAY, LMA: Performed by: ANESTHESIOLOGY

## 2023-11-27 PROCEDURE — D9220A PRA ANESTHESIA: ICD-10-PCS | Mod: CRNA,,, | Performed by: STUDENT IN AN ORGANIZED HEALTH CARE EDUCATION/TRAINING PROGRAM

## 2023-11-27 PROCEDURE — 36000707: Performed by: OBSTETRICS & GYNECOLOGY

## 2023-11-27 PROCEDURE — 57522 CONIZATION OF CERVIX: CPT | Mod: ,,, | Performed by: OBSTETRICS & GYNECOLOGY

## 2023-11-27 PROCEDURE — D9220A PRA ANESTHESIA: Mod: CRNA,,, | Performed by: STUDENT IN AN ORGANIZED HEALTH CARE EDUCATION/TRAINING PROGRAM

## 2023-11-27 PROCEDURE — 37000008 HC ANESTHESIA 1ST 15 MINUTES: Performed by: OBSTETRICS & GYNECOLOGY

## 2023-11-27 PROCEDURE — 71000033 HC RECOVERY, INTIAL HOUR: Performed by: OBSTETRICS & GYNECOLOGY

## 2023-11-27 RX ORDER — ONDANSETRON 2 MG/ML
INJECTION INTRAMUSCULAR; INTRAVENOUS
Status: DISCONTINUED | OUTPATIENT
Start: 2023-11-27 | End: 2023-11-27

## 2023-11-27 RX ORDER — ACETAMINOPHEN 10 MG/ML
INJECTION, SOLUTION INTRAVENOUS
Status: DISCONTINUED | OUTPATIENT
Start: 2023-11-27 | End: 2023-11-27

## 2023-11-27 RX ORDER — FENTANYL CITRATE 50 UG/ML
INJECTION, SOLUTION INTRAMUSCULAR; INTRAVENOUS
Status: DISCONTINUED | OUTPATIENT
Start: 2023-11-27 | End: 2023-11-27

## 2023-11-27 RX ORDER — SODIUM CHLORIDE 9 MG/ML
INJECTION, SOLUTION INTRAVENOUS CONTINUOUS
Status: DISCONTINUED | OUTPATIENT
Start: 2023-11-27 | End: 2023-11-27 | Stop reason: HOSPADM

## 2023-11-27 RX ORDER — FENTANYL CITRATE 50 UG/ML
25 INJECTION, SOLUTION INTRAMUSCULAR; INTRAVENOUS EVERY 5 MIN PRN
Status: DISCONTINUED | OUTPATIENT
Start: 2023-11-27 | End: 2023-11-27 | Stop reason: HOSPADM

## 2023-11-27 RX ORDER — LIDOCAINE HYDROCHLORIDE 20 MG/ML
INJECTION INTRAVENOUS
Status: DISCONTINUED | OUTPATIENT
Start: 2023-11-27 | End: 2023-11-27

## 2023-11-27 RX ORDER — SODIUM CHLORIDE 0.9 G/100ML
IRRIGANT IRRIGATION
Status: DISCONTINUED | OUTPATIENT
Start: 2023-11-27 | End: 2023-11-27 | Stop reason: HOSPADM

## 2023-11-27 RX ORDER — LIDOCAINE HYDROCHLORIDE 10 MG/ML
1 INJECTION, SOLUTION EPIDURAL; INFILTRATION; INTRACAUDAL; PERINEURAL ONCE
Status: COMPLETED | OUTPATIENT
Start: 2023-11-27 | End: 2023-11-27

## 2023-11-27 RX ORDER — PROPOFOL 10 MG/ML
VIAL (ML) INTRAVENOUS
Status: DISCONTINUED | OUTPATIENT
Start: 2023-11-27 | End: 2023-11-27

## 2023-11-27 RX ORDER — DEXAMETHASONE SODIUM PHOSPHATE 4 MG/ML
INJECTION, SOLUTION INTRA-ARTICULAR; INTRALESIONAL; INTRAMUSCULAR; INTRAVENOUS; SOFT TISSUE
Status: DISCONTINUED | OUTPATIENT
Start: 2023-11-27 | End: 2023-11-27

## 2023-11-27 RX ORDER — MIDAZOLAM HYDROCHLORIDE 1 MG/ML
INJECTION INTRAMUSCULAR; INTRAVENOUS
Status: DISCONTINUED | OUTPATIENT
Start: 2023-11-27 | End: 2023-11-27

## 2023-11-27 RX ORDER — ONDANSETRON 2 MG/ML
4 INJECTION INTRAMUSCULAR; INTRAVENOUS ONCE AS NEEDED
Status: DISCONTINUED | OUTPATIENT
Start: 2023-11-27 | End: 2023-11-27 | Stop reason: HOSPADM

## 2023-11-27 RX ORDER — MUPIROCIN 20 MG/G
OINTMENT TOPICAL
Status: DISCONTINUED | OUTPATIENT
Start: 2023-11-27 | End: 2023-11-27 | Stop reason: HOSPADM

## 2023-11-27 RX ORDER — ACETAMINOPHEN AND CODEINE PHOSPHATE 300; 30 MG/1; MG/1
1 TABLET ORAL EVERY 4 HOURS PRN
Qty: 12 TABLET | Refills: 0 | Status: SHIPPED | OUTPATIENT
Start: 2023-11-27 | End: 2023-12-07

## 2023-11-27 RX ORDER — SODIUM CHLORIDE, SODIUM LACTATE, POTASSIUM CHLORIDE, CALCIUM CHLORIDE 600; 310; 30; 20 MG/100ML; MG/100ML; MG/100ML; MG/100ML
INJECTION, SOLUTION INTRAVENOUS CONTINUOUS
Status: DISCONTINUED | OUTPATIENT
Start: 2023-11-27 | End: 2023-11-27 | Stop reason: HOSPADM

## 2023-11-27 RX ORDER — SODIUM CHLORIDE, SODIUM LACTATE, POTASSIUM CHLORIDE, CALCIUM CHLORIDE 600; 310; 30; 20 MG/100ML; MG/100ML; MG/100ML; MG/100ML
125 INJECTION, SOLUTION INTRAVENOUS CONTINUOUS
Status: DISCONTINUED | OUTPATIENT
Start: 2023-11-27 | End: 2023-11-27 | Stop reason: HOSPADM

## 2023-11-27 RX ORDER — OXYCODONE HYDROCHLORIDE 5 MG/1
5 TABLET ORAL ONCE AS NEEDED
Status: DISCONTINUED | OUTPATIENT
Start: 2023-11-27 | End: 2023-11-27 | Stop reason: HOSPADM

## 2023-11-27 RX ORDER — DIPHENHYDRAMINE HYDROCHLORIDE 50 MG/ML
INJECTION INTRAMUSCULAR; INTRAVENOUS
Status: DISCONTINUED | OUTPATIENT
Start: 2023-11-27 | End: 2023-11-27

## 2023-11-27 RX ADMIN — ACETAMINOPHEN 1000 MG: 10 INJECTION, SOLUTION INTRAVENOUS at 10:11

## 2023-11-27 RX ADMIN — FENTANYL CITRATE 25 MCG: 50 INJECTION, SOLUTION INTRAMUSCULAR; INTRAVENOUS at 10:11

## 2023-11-27 RX ADMIN — ONDANSETRON 4 MG: 2 INJECTION, SOLUTION INTRAMUSCULAR; INTRAVENOUS at 09:11

## 2023-11-27 RX ADMIN — SODIUM CHLORIDE, POTASSIUM CHLORIDE, SODIUM LACTATE AND CALCIUM CHLORIDE: 600; 310; 30; 20 INJECTION, SOLUTION INTRAVENOUS at 08:11

## 2023-11-27 RX ADMIN — MUPIROCIN: 20 OINTMENT TOPICAL at 09:11

## 2023-11-27 RX ADMIN — PROPOFOL 150 MG: 10 INJECTION, EMULSION INTRAVENOUS at 09:11

## 2023-11-27 RX ADMIN — LIDOCAINE HYDROCHLORIDE 10 MG: 10 INJECTION, SOLUTION EPIDURAL; INFILTRATION; INTRACAUDAL; PERINEURAL at 08:11

## 2023-11-27 RX ADMIN — DEXAMETHASONE SODIUM PHOSPHATE 4 MG: 4 INJECTION, SOLUTION INTRAMUSCULAR; INTRAVENOUS at 10:11

## 2023-11-27 RX ADMIN — MIDAZOLAM 2 MG: 1 INJECTION INTRAMUSCULAR; INTRAVENOUS at 09:11

## 2023-11-27 RX ADMIN — LIDOCAINE HYDROCHLORIDE 80 MG: 20 INJECTION, SOLUTION INTRAVENOUS at 09:11

## 2023-11-27 RX ADMIN — DIPHENHYDRAMINE HYDROCHLORIDE 6.25 MG: 50 INJECTION, SOLUTION INTRAMUSCULAR; INTRAVENOUS at 10:11

## 2023-11-27 NOTE — OP NOTE
[facility]  Department of Obstetrics & Gynecology  Operative Note      PATIENT NAME: Regina Brown    MRN: [id number]  TODAY'S DATE: 11/27/2023  ADMIT DATE: 11/27/2023                              OPERATIVE REPORT:  11/27/2023    SURGEON: Phoenix Fragoso MD    ASSISTANT:  None    PREOPERATIVE DIAGNOSIS:    1. High grade squamous intraepithelial lesion (HGSIL), grade 3 GIA, on biopsy of cervix   2. Pap smear of cervix with ASCUS, cannot exclude HGSIL   3. HPV in female   4. High grade squamous intraepithelial lesion (HGSIL), grade 2 GIA, on biopsy of cervix     POSTOPERATIVE DIAGNOSIS:    1. High grade squamous intraepithelial lesion (HGSIL), grade 3 GIA, on biopsy of cervix   2. Pap smear of cervix with ASCUS, cannot exclude HGSIL   3. HPV in female   4. High grade squamous intraepithelial lesion (HGSIL), grade 2 GIA, on biopsy of cervix     OPERATION:    1. LOOP Electrosurgical Excision Procedure (LEEP conization)  2. Endocervical Curettage    ANESTHESIA:  General with LMA .    ESTIMATED BLOOD LOSS: Minimal    FLUIDS: 700 mL of crystalloid.    URINE OUTPUT: 50 mL.    FINDINGS:     No visible vulvar or vaginal abnormalities were noted.  Atrophic changes only     Prior to the procedure the patient's colposcopy note was reviewed evaluating the previous biopsy site of area of concern.    PATHOLOGY: Cervical conization and endocervical curettage     PROCEDURE IN DETAIL:     The patient was initially seen in the preoperative area.  The pros, cons, risks, benefits, alternatives, and indications of the procedure reviewed and discussed.  The patient's questions were answered at this time and she is in agreement with proceeding with surgery.    The patient was taken to the [facility] operating room, where general anesthesia with LMA was initiated. The patient was prepped and draped in the usual sterile fashion in the dorsal lithotomy position in the [facility].  As above, the patient's  bladder was drained of clear urine.    A safety / time-out procedure was now performed, with all participating parties in agreement as to the surgical plan.  Sequential compression hose were in place.    A non-conduction speculum was introduced into the vagina.  Lugol's solution was unavailable at this time.  The  cervix was thoroughly visualized.  The appropriate size loop was selected and transformation zone, including the area of concern was excised in usual fashion.  This occurred under direct visualization after previous evaluation of the colposcopy findings.      Following conization, an endocervical curettage, covering all four quadrants, was performed to evaluate the remaining endocervical canal.      Hemostasis was now obtained with the ball Bovie.    Final evaluation noted good hemostasis.  All instruments were removed from the vagina.     The patient tolerated procedure well.    Counts correct x 2 with no complications per nursing.    Phoenix Fragoso MD  Department OBGYN [facility]

## 2023-11-27 NOTE — ANESTHESIA PREPROCEDURE EVALUATION
11/27/2023  Regina Brown is a 63 y.o., female.      Pre-op Assessment    I have reviewed the Patient Summary Reports.     I have reviewed the Nursing Notes. I have reviewed the NPO Status.   I have reviewed the Medications.     Review of Systems  Anesthesia Hx:  No problems with previous Anesthesia                Social:  Former Smoker, Recreational Drugs       Hematology/Oncology:  Hematology Normal   Oncology Normal                                   Cardiovascular:     Hypertension                                  Hypertension         Pulmonary:        Sleep Apnea     Obstructive Sleep Apnea (MARYSE).           Renal/:  Renal/ Normal                 Musculoskeletal:  Musculoskeletal Normal                Neurological:  Neurology Normal                                      Dermatological:  Skin Normal    Psych:  Psychiatric Normal                    Physical Exam  General: Well nourished, Cooperative, Alert and Oriented    Airway:  Mallampati: II   Mouth Opening: Normal  TM Distance: Normal  Tongue: Normal    Dental:  Dentures    Chest/Lungs:  Normal Respiratory Rate    Heart:  Rate: Normal        Anesthesia Plan  Type of Anesthesia, risks & benefits discussed:    Anesthesia Type: Gen Supraglottic Airway  Intra-op Monitoring Plan: Standard ASA Monitors  Post Op Pain Control Plan: multimodal analgesia and IV/PO Opioids PRN  Induction:  IV  Airway Plan: , Post-Induction  Informed Consent: Informed consent signed with the Patient and all parties understand the risks and agree with anesthesia plan.  All questions answered.   ASA Score: 2    Ready For Surgery From Anesthesia Perspective.     .

## 2023-11-27 NOTE — PLAN OF CARE
Pt DC to car by WC , friend here to take pt home. Pt aware of DC instructions, follow up and to get med from pharmacy. Ambulated to BR prior to DC.

## 2023-11-27 NOTE — ANESTHESIA PROCEDURE NOTES
Intubation    Date/Time: 11/27/2023 9:58 AM    Performed by: Livier Pereira CRNA  Authorized by: Richmond Alicea MD    Intubation:     Induction:  Intravenous    Mask Ventilation:  Not attempted    Attempts:  1    Attempted By:  CRNA    Difficult Airway Encountered?: No      Complications:  None    Airway Device:  Supraglottic airway/LMA    Airway Device Size:  3.0    Secured at:  The lips    Placement Verified By:  Capnometry    Complicating Factors:  None    Findings Post-Intubation:  BS equal bilateral and atraumatic/condition of teeth unchanged

## 2023-11-27 NOTE — TELEPHONE ENCOUNTER
Advised Mame with pharmacy ok to fill prescribed medication for post operative pain. Mame voiced understanding.

## 2023-11-27 NOTE — ANESTHESIA POSTPROCEDURE EVALUATION
Anesthesia Post Evaluation    Patient: Regina Brown    Procedure(s) Performed: Procedure(s) (LRB):  LEEP CONIZATION, CERVIX AND OTHER INDICATED PROCEDURES, Endocervical Curettage (N/A)    Final Anesthesia Type: general      Patient location during evaluation: PACU  Patient participation: Yes- Able to Participate  Level of consciousness: awake and alert  Post-procedure vital signs: reviewed and stable  Pain management: adequate  Airway patency: patent    PONV status at discharge: No PONV  Anesthetic complications: no      Cardiovascular status: hemodynamically stable  Respiratory status: unassisted and room air  Hydration status: euvolemic  Follow-up not needed.          Vitals Value Taken Time   /60 11/27/23 1055   Temp 36.2 °C (97.1 °F) 11/27/23 1031   Pulse 76 11/27/23 1100   Resp 18 11/27/23 1100   SpO2 96 % 11/27/23 1100         Event Time   Out of Recovery 11:15:00         Pain/Susan Score: Susan Score: 10 (11/27/2023 11:15 AM)

## 2023-11-27 NOTE — TELEPHONE ENCOUNTER
----- Message from Dannielle Yulia sent at 11/27/2023  9:52 AM CST -----  Regarding: pharmacy  Contact: Mame with [facility]  Type:  Pharmacy Calling to Clarify an RX    Name of Caller:  [facility]  Pharmacy Name:    Prescription Name:  acetaminophen-codeine 300-30mg (TYLENOL #3) 300-30 mg Tab  What do they need to clarify?:  interaction  Best Call Back Number:  [phone]  Additional Information:  Please call Mame to advise.  Thanks!

## 2023-11-27 NOTE — H&P
Subjective:   Chief Complaint:  Follow-up (Colpo follow up)           Subjective   Patient ID: Regina Brown is a  63 y.o. female.     Date: 10/21/2023      HRT: None     She was recently seen by ADRIANA Sahu for her annual exam. [as the patient is new to me her previous office note and chart were reviewed.]  She reports no gynecologic evaluation or Pap smear since .     She presents today due to a recent abnormal Pap smear.     Her Pap smear from 2023 noted atypical squamous cells can not exclude a high-grade intraepithelial lesion. / HPV was positive for high-risk type 16.  Negative for type 18..     History of previous abnormal Pap smears:  Negative     Date: 10/19/2023     The patient presents today for follow-up.  She was last seen as above, time she underwent colposcopic evaluation due to an abnormal Pap smear.    She presents today to discuss results her pathology as well as further evaluation and treatment options.     Final Pathologic Diagnosis 1.  Endocervical curettage: Fragments of ectocervix with severe squamous dysplasia (GIA 3/HSIL) admixed with scant benign endocervix   2. Cervix at 5, biopsy:     Severe squamous dysplasia (GIA 3/HSIL)       GYN & OB History  No LMP recorded. Patient is postmenopausal.                       OB History    Para Term  AB Living   3 3 3     3   SAB IAB Ectopic Multiple Live Births              3          # Outcome Date GA Lbr Kaveh/2nd Weight Sex Delivery Anes PTL Lv   3 Term        M Vag-Spont None   CY   2 Term        F Vag-Spont None   CY   1 Term        F Vag-Spont EPI   CY       Obstetric Comments   Vaginal delivery x 3              Past Medical History:   Diagnosis Date    Abnormal Pap smear of cervix      High cholesterol      HTN (hypertension)      Murmur, heart                 Past Surgical History:   Procedure Laterality Date    APPENDECTOMY       ELBOW SURGERY Right 1998    FINGER MASS EXCISION Left  05/25/2023     Procedure: EXCISION, MASS, FINGER;  Surgeon: Migel Richards MD;  Location: [facility];  Service: Orthopedics;  Laterality: Left;         Review of Systems  Review of Systems   Constitutional:  Negative for fever and unexpected weight change.   Respiratory: Negative.     Cardiovascular:  Negative for chest pain and palpitations.   Gastrointestinal:  Negative for nausea and vomiting.   Genitourinary:  Negative for dysuria, hematuria and urgency.        Gyn as per HPI   Neurological:  Negative for headaches.               Objective:      Objective       Vitals:     10/19/23 1043   BP: (!) 120/100   Resp: 12         Physical Exam:   Constitutional: She appears well-developed and well-nourished.    HENT:   Head: Normocephalic.     Neck: No thyroid mass present.    Cardiovascular:  Normal rate.             Pulmonary/Chest: Effort normal. No respiratory distress.         Abdominal: Soft. There is no abdominal tenderness.     Genitourinary: Vaginal atrophy noted.                  Musculoskeletal: Normal range of motion.      Right lower leg: No edema.      Left lower leg: No edema.       Neurological: She is alert.   No gross lesions noted.    Skin: Skin is warm and dry.    Psychiatric: She has a normal mood and affect. Her speech is normal and behavior is normal. Mood normal.         Pap smear from 9/20/2023 noted atypical squamous cells can not exclude a high-grade intraepithelial lesion. / HPV was positive for high-risk type 16.  Negative for type 18..     Final Pathologic Diagnosis 1.  Endocervical curettage: Fragments of ectocervix with severe squamous dysplasia (GIA 3/HSIL) admixed with scant benign endocervix   2. Cervix at 5, biopsy:     Severe squamous dysplasia (GIA 3/HSIL)          Assessment:         Assessment   1. High grade squamous intraepithelial lesion (HGSIL), grade 3 GIA, on biopsy of cervix    2. Pap smear of cervix with ASCUS, cannot exclude HGSIL    3. HPV in female    4. High grade  squamous intraepithelial lesion (HGSIL), grade 2 GIA, on biopsy of cervix          Plan:         Plan   High grade squamous intraepithelial lesion (HGSIL), grade 3 GIA, on biopsy of cervix  -     Place in Outpatient; Standing  -     Case Request Operating Room: LEEP CONIZATION, CERVIX AND OTHER INDICATED PROCEDURES  -     Full code; Standing  -     Vital signs; Standing  -     Bed rest with bathroom privileges; Standing  -     Insert peripheral IV; Standing  -     Notify Physician/Vital Signs Parameters Urine output less than 0.5mL/kg/hr (with indwelling catheter) or 30 mL/hr (without indwelling catheter) or blood glucose greater than 200 mg/dL; Standing  -     Notify physician; Standing  -     Notify Physician - Potential Need of Opioid Reversal; Standing  -     Diet NPO; Standing  -     Place sequential compression device; Standing     Pap smear of cervix with ASCUS, cannot exclude HGSIL     HPV in female     High grade squamous intraepithelial lesion (HGSIL), grade 2 GIA, on biopsy of cervix     Other orders  -     0.9%  NaCl infusion  -     IP VTE LOW RISK PATIENT; Standing  -     mupirocin 2 % ointment        Follow up for As needed or 2 weeks following surgery.      The above was reviewed and discussed with the patient.     We discussed the results of her Pap smear from 9/20/2023 noted atypical squamous cells can not exclude a high-grade intraepithelial lesion. / HPV was positive for high-risk type 16.  Negative for type 18.  And the findings of severe dysplasia, GIA three/HGSIL on both cervical biopsy and endocervical curettage.     Conservative, lack of med medical, and surgical interventions were discussed, and the patient would like to proceed with surgical intervention.  She will be scheduled for a LEEP CONIZATION OF THE CERVIX AND OTHER INDICATED PROCEDURES     The pros, cons, risks, benefits, alternatives, and indications of the surgical procedure were discussed in detail with the patient.  We discussed  the possibility of allergic reactions, bleeding, infection damage to cervix, uterus, bladder, ureters, bowel, and other abdominal pelvic organs.  Issues unique to this procedure were discussed.     The patient's questions regarding above were answered and she agrees with this plan.  The patient was provided with the informed consent form and given times reviewed the form.  Questions were answered and consent was obtained     Phoenix Fragoso MD  Department OBGYN  [facility]

## 2023-11-27 NOTE — DISCHARGE SUMMARY
[facility] - Periop Services  Discharge Note  Short Stay    Procedure(s) (LRB):  LEEP CONIZATION, CERVIX AND OTHER INDICATED PROCEDURES, Endocervical Curettage (N/A)      OUTCOME: Patient tolerated treatment/procedure well without complication and is now ready for discharge.    DISPOSITION: Home or Self Care    FINAL DIAGNOSIS:  High grade squamous intraepithelial lesion (HGSIL), grade 3 GIA, on biopsy of cervix    FOLLOWUP: In clinic    DISCHARGE INSTRUCTIONS:    Discharge Procedure Orders   Diet general     Sponge bath only until clinic visit     Keep surgical extremity elevated     Ice to affected area     Lifting restrictions     Other restrictions (specify):   Order Comments: Discharge Instructions:  Follow-up in 2 weeks or as needed.  Call immediately for any abnormal pain bleeding fever chills nausea vomiting or other significant postoperative issues.    Pelvic rest until follow-up.  No tub baths until follow-up.  Diet as tolerated     Call MD for:  temperature >100.4     Call MD for:  persistent nausea and vomiting     Call MD for:  severe uncontrolled pain     Call MD for:  difficulty breathing, headache or visual disturbances     Call MD for:  redness, tenderness, or signs of infection (pain, swelling, redness, odor or green/yellow discharge around incision site)     Call MD for:  hives     Call MD for:  persistent dizziness or light-headedness     Call MD for:  extreme fatigue     Call MD for:   Order Comments: Discharge Instructions:  Follow-up in 2 weeks or as needed.  Call immediately for any abnormal pain bleeding fever chills nausea vomiting or other significant postoperative issues.    Pelvic rest until follow-up.  No tub baths until follow-up.  Diet as tolerated     No dressing needed     Activity as tolerated     Shower on day dressing removed (No bath)   Order Comments: No tub baths until patient seen for postoperative evaluation in the office         Clinical Reference Documents  Added to Patient Instructions         Document    LOOP ELECTROSURGICAL EXCISION PROCEDURE DISCHARGE INSTRUCTIONS (ENGLISH)            TIME SPENT ON DISCHARGE: 10 minutes    Discharge medications:  In light of the patient's allergy prescription for Tylenol # 3 sent to pharmacy.  The patient has used this in the past without difficulty.    The patient may be discharged home when she is ambulating, tolerating clear liquids, able to take pain medications by mouth, has no significant nausea and is in agreement with discharge plan.

## 2023-11-27 NOTE — INTERVAL H&P NOTE
The patient has been examined and the H&P has been reviewed:    I concur with the findings and no changes have occurred since H&P was written.    Surgery risks, benefits and alternative options discussed and understood by patient/family.    Pap smear from 9/20/2023 noted atypical squamous cells can not exclude a high-grade intraepithelial lesion. / HPV was positive for high-risk type 16.  Negative for type 18..     Final Pathologic Diagnosis 1.  Endocervical curettage: Fragments of ectocervix with severe squamous dysplasia (GIA 3/HSIL) admixed with scant benign endocervix   2. Cervix at 5, biopsy:     Severe squamous dysplasia (GIA 3/HSIL)     Assessment:  1. High grade squamous intraepithelial lesion (HGSIL), grade 3 GIA, on biopsy of cervix   2. Pap smear of cervix with ASCUS, cannot exclude HGSIL   3. HPV in female   4. High grade squamous intraepithelial lesion (HGSIL), grade 2 GIA, on biopsy of cervix      Plan:LEEP CONIZATION OF THE CERVIX AND OTHER INDICATED PROCEDURES      There are no hospital problems to display for this patient.

## 2023-11-27 NOTE — TRANSFER OF CARE
"Anesthesia Transfer of Care Note    Patient: Regina Brown    Procedure(s) Performed: Procedure(s) (LRB):  LEEP CONIZATION, CERVIX AND OTHER INDICATED PROCEDURES, Endocervical Curettage (N/A)    Patient location: PACU    Anesthesia Type: general    Transport from OR: Transported from OR on 2-3 L/min O2 by NC with adequate spontaneous ventilation    Post pain: adequate analgesia    Post assessment: no apparent anesthetic complications    Post vital signs: stable    Level of consciousness: responds to stimulation, awake and alert    Nausea/Vomiting: no nausea/vomiting    Complications: none    Transfer of care protocol was followed      Last vitals: Visit Vitals  BP (!) 149/95   Pulse 87   Temp 36.2 °C (97.1 °F) (Skin)   Resp 18   Ht 5' 6" (1.676 m)   Wt 79.1 kg (174 lb 6.1 oz)   SpO2 95%   Breastfeeding No   BMI 28.15 kg/m²     "

## 2023-11-28 ENCOUNTER — TELEPHONE (OUTPATIENT)
Dept: OBSTETRICS AND GYNECOLOGY | Facility: CLINIC | Age: 63
End: 2023-11-28
Payer: MEDICARE

## 2023-11-28 ENCOUNTER — TELEPHONE (OUTPATIENT)
Dept: GYNECOLOGIC ONCOLOGY | Facility: CLINIC | Age: 63
End: 2023-11-28
Payer: MEDICARE

## 2023-11-28 VITALS
HEART RATE: 76 BPM | SYSTOLIC BLOOD PRESSURE: 133 MMHG | DIASTOLIC BLOOD PRESSURE: 60 MMHG | OXYGEN SATURATION: 96 % | HEIGHT: 66 IN | RESPIRATION RATE: 18 BRPM | WEIGHT: 174.38 LBS | TEMPERATURE: 97 F | BODY MASS INDEX: 28.03 KG/M2

## 2023-11-28 DIAGNOSIS — C53.8 MALIGNANT NEOPLASM OF OVERLAPPING SITES OF CERVIX: Primary | ICD-10-CM

## 2023-11-28 NOTE — NURSING
New referral received from Dr Fragoso for recent diagnosis of cervical cancer. Pt called and name and  verified. Explained my role as nurse navigator and direct number provided. Options for GYN/ONC providers, all clinic locations and soonest availability discussed with pt. Pt scheduled to see Dr De La Torre in the next available appointment. Patient encouraged to call for any questions or concerns about her care or appointments. Pt verbalized understanding. Date time and location of appointment reviewed with pt.    Oncology Navigation   Intake  Cancer Type: Gynecologic (cervical)  Internal / External Referral: Internal (Dr Phoenix Fragoso)  Date of Referral: 23  Initial Nurse Navigator Contact: 23  Referral to Initial Contact Timeline (days): 0  Date Worked: 23  First Appointment Available: 23  Appointment Date: 23  First Available Date vs. Scheduled Date (days): 0     Treatment  Current Status: Active        Procedures: Biopsy; Other  Biopsy Schedule Date: 23 (CERVIX, CONIZATION: INVASIVE, MODERATE TO POORLY DIFFERENTIATED SQUAMOUS CELL CARCINOMA  - MARGINS POSITIVE FOR CARCINOMA  2. ENDOCERVIX, CURETTAGE: FRAGMENTS OF SQUAMOUS CELL CARCINOMA)  Other Schedule Date: 10/04/23 (Endocervical curettage:  Fragments of ectocervix with severe squamous dysplasia (GIA 3/HSIL) admixed with scant benign  endocervix Cervix at 5, biopsy:  Severe squamous dysplasia (GIA 3/HSIL))           Acuity      Follow Up  No follow-ups on file.

## 2023-11-28 NOTE — TELEPHONE ENCOUNTER
The patient is status post LEEP conization with pathology confirming moderate poorly differentiated squamous cell carcinoma    DIAGNOSIS:     1. CERVIX, CONIZATION:   - INVASIVE, MODERATE TO POORLY DIFFERENTIATED SQUAMOUS CELL CARCINOMA   - MARGINS POSITIVE FOR CARCINOMA     2. ENDOCERVIX, CURETTAGE:   - FRAGMENTS OF SQUAMOUS CELL CARCINOMA     In light of these findings the patient was called and notified of her diagnosis.  We discussed the significance of this diagnosis and the plan for referral to Gynecologic Oncology.      The patient's questions regarding the above were answered.  The patient was felt to be inappropriate psychological status following this diagnosis.      Phoenix Fragoso MD  OBGYN [facility]

## 2023-11-30 ENCOUNTER — OFFICE VISIT (OUTPATIENT)
Dept: GYNECOLOGIC ONCOLOGY | Facility: CLINIC | Age: 63
End: 2023-11-30
Payer: MEDICARE

## 2023-11-30 VITALS
WEIGHT: 173.94 LBS | OXYGEN SATURATION: 97 % | HEART RATE: 99 BPM | BODY MASS INDEX: 27.95 KG/M2 | TEMPERATURE: 98 F | RESPIRATION RATE: 16 BRPM | HEIGHT: 66 IN | DIASTOLIC BLOOD PRESSURE: 85 MMHG | SYSTOLIC BLOOD PRESSURE: 155 MMHG

## 2023-11-30 DIAGNOSIS — R45.89 ANXIETY ABOUT HEALTH: Primary | ICD-10-CM

## 2023-11-30 DIAGNOSIS — C53.8 MALIGNANT NEOPLASM OF OVERLAPPING SITES OF CERVIX: ICD-10-CM

## 2023-11-30 PROCEDURE — 1159F MED LIST DOCD IN RCRD: CPT | Mod: CPTII,S$GLB,, | Performed by: OBSTETRICS & GYNECOLOGY

## 2023-11-30 PROCEDURE — 99205 OFFICE O/P NEW HI 60 MIN: CPT | Mod: 24,S$GLB,, | Performed by: OBSTETRICS & GYNECOLOGY

## 2023-11-30 PROCEDURE — 3008F BODY MASS INDEX DOCD: CPT | Mod: CPTII,S$GLB,, | Performed by: OBSTETRICS & GYNECOLOGY

## 2023-11-30 PROCEDURE — 3079F DIAST BP 80-89 MM HG: CPT | Mod: CPTII,S$GLB,, | Performed by: OBSTETRICS & GYNECOLOGY

## 2023-11-30 PROCEDURE — 3079F PR MOST RECENT DIASTOLIC BLOOD PRESSURE 80-89 MM HG: ICD-10-PCS | Mod: CPTII,S$GLB,, | Performed by: OBSTETRICS & GYNECOLOGY

## 2023-11-30 PROCEDURE — 3044F PR MOST RECENT HEMOGLOBIN A1C LEVEL <7.0%: ICD-10-PCS | Mod: CPTII,S$GLB,, | Performed by: OBSTETRICS & GYNECOLOGY

## 2023-11-30 PROCEDURE — 3077F PR MOST RECENT SYSTOLIC BLOOD PRESSURE >= 140 MM HG: ICD-10-PCS | Mod: CPTII,S$GLB,, | Performed by: OBSTETRICS & GYNECOLOGY

## 2023-11-30 PROCEDURE — 3077F SYST BP >= 140 MM HG: CPT | Mod: CPTII,S$GLB,, | Performed by: OBSTETRICS & GYNECOLOGY

## 2023-11-30 PROCEDURE — 1159F PR MEDICATION LIST DOCUMENTED IN MEDICAL RECORD: ICD-10-PCS | Mod: CPTII,S$GLB,, | Performed by: OBSTETRICS & GYNECOLOGY

## 2023-11-30 PROCEDURE — 99205 PR OFFICE/OUTPT VISIT, NEW, LEVL V, 60-74 MIN: ICD-10-PCS | Mod: 24,S$GLB,, | Performed by: OBSTETRICS & GYNECOLOGY

## 2023-11-30 PROCEDURE — 99999 PR PBB SHADOW E&M-EST. PATIENT-LVL IV: ICD-10-PCS | Mod: PBBFAC,,, | Performed by: OBSTETRICS & GYNECOLOGY

## 2023-11-30 PROCEDURE — 99999 PR PBB SHADOW E&M-EST. PATIENT-LVL IV: CPT | Mod: PBBFAC,,, | Performed by: OBSTETRICS & GYNECOLOGY

## 2023-11-30 PROCEDURE — 3044F HG A1C LEVEL LT 7.0%: CPT | Mod: CPTII,S$GLB,, | Performed by: OBSTETRICS & GYNECOLOGY

## 2023-11-30 PROCEDURE — 3008F PR BODY MASS INDEX (BMI) DOCUMENTED: ICD-10-PCS | Mod: CPTII,S$GLB,, | Performed by: OBSTETRICS & GYNECOLOGY

## 2023-11-30 RX ORDER — LORAZEPAM 0.5 MG/1
0.5 TABLET ORAL EVERY 8 HOURS PRN
Qty: 30 TABLET | Refills: 0 | Status: SHIPPED | OUTPATIENT
Start: 2023-11-30 | End: 2024-02-14

## 2023-11-30 NOTE — PROGRESS NOTES
Subjective:      Patient ID: Regina Brown is a 63 y.o. female.    Chief Complaint: Cervical Cancer and Other      HPI  Referred by Dr. Fragoso for newly diagnosed squamous cell carcinoma of the cervix.     Pap smear from 2023 noted atypical squamous cells can not exclude a high-grade intraepithelial lesion. HPV was positive for high-risk type 16. Negative for type 18.     Colposcopic biopsies 10/4/2023  Final Pathologic Diagnosis 1.  Endocervical curettage: Fragments of ectocervix with severe squamous dysplasia (GIA 3/HSIL) admixed with scant benign endocervix   2. Cervix at 5, biopsy:     Severe squamous dysplasia (GIA 3/HSIL)      Underwent LEEP 2023   1. CERVIX, CONIZATION:   - INVASIVE, MODERATE TO POORLY DIFFERENTIATED SQUAMOUS CELL CARCINOMA   - MARGINS POSITIVE FOR CARCINOMA     2. ENDOCERVIX, CURETTAGE:   - FRAGMENTS OF SQUAMOUS CELL CARCINOMA     Prior abdominal surgeries include open appendectomy as a child.  x 3.   Review of Systems   Constitutional:  Negative for appetite change, chills, fatigue and fever.   HENT:  Negative for mouth sores.    Respiratory:  Negative for cough and shortness of breath.    Cardiovascular:  Negative for leg swelling.   Gastrointestinal:  Negative for abdominal pain, blood in stool, constipation and diarrhea.   Endocrine: Negative for cold intolerance.   Genitourinary:  Negative for dysuria and vaginal bleeding.   Musculoskeletal:  Negative for myalgias.   Skin:  Negative for rash.   Allergic/Immunologic: Negative.    Neurological:  Negative for weakness and numbness.   Hematological:  Negative for adenopathy. Does not bruise/bleed easily.   Psychiatric/Behavioral:  Negative for confusion.        Past Medical History:   Diagnosis Date    Abnormal Pap smear of cervix     High cholesterol     HTN (hypertension)     Murmur, heart     Sleep apnea     cpap     Past Surgical History:   Procedure Laterality Date    APPENDECTOMY      CONIZATION OF CERVIX USING  LOOP ELECTROSURGICAL EXCISION PROCEDURE (LEEP) N/A 2023    Procedure: LEEP CONIZATION, CERVIX AND OTHER INDICATED PROCEDURES, Endocervical Curettage;  Surgeon: Phoenix Fragoso MD;  Location: [facility] OR;  Service: OB/GYN;  Laterality: N/A;    ELBOW SURGERY Right 1998    FINGER MASS EXCISION Left 2023    Procedure: EXCISION, MASS, FINGER;  Surgeon: Migel Richards MD;  Location: [facility] OR;  Service: Orthopedics;  Laterality: Left;     No family history on file.  Social History     Socioeconomic History    Marital status: Single   Tobacco Use    Smoking status: Former     Current packs/day: 0.00     Average packs/day: 1 pack/day for 47.5 years (47.5 ttl pk-yrs)     Types: Cigarettes     Start date: 1976     Quit date: 2023     Years since quittin.4     Passive exposure: Never    Smokeless tobacco: Never    Tobacco comments:     23 Active participant with smoking cessation. 23 QUIT SMOKING 23.    Substance and Sexual Activity    Alcohol use: No    Drug use: Yes     Types: Marijuana    Sexual activity: Yes     Partners: Male     Current Outpatient Medications   Medication Sig    acetaminophen (TYLENOL) 500 MG tablet Take 500 mg by mouth.    cyclobenzaprine (FLEXERIL) 10 MG tablet Take 1 tablet (10 mg total) by mouth 3 (three) times daily as needed for Muscle spasms.    rosuvastatin (CRESTOR) 20 MG tablet Take 1 tablet (20 mg total) by mouth once daily.    LORazepam (ATIVAN) 0.5 MG tablet Take 1 tablet (0.5 mg total) by mouth every 8 (eight) hours as needed for Anxiety.     No current facility-administered medications for this visit.     Review of patient's allergies indicates:   Allergen Reactions    Aspirin      STOMACH BURNING    Lortab [hydrocodone-acetaminophen] Other (See Comments)     Dizziness and nausea    Opioids-meperidine and related Rash       Objective:   Physical Exam:   Constitutional: She is oriented to person, place, and time. She appears well-developed and  well-nourished.    HENT:   Head: Normocephalic and atraumatic.    Eyes: Pupils are equal, round, and reactive to light. EOM are normal.    Neck: No thyromegaly present.    Cardiovascular:  Normal rate, regular rhythm and intact distal pulses.             Pulmonary/Chest: Effort normal and breath sounds normal. No respiratory distress. She has no wheezes.        Abdominal: Soft. Bowel sounds are normal. She exhibits no distension and no mass. There is no abdominal tenderness.     Genitourinary:    Vagina, uterus and rectum normal.      Pelvic exam was performed with patient supine.   There is no lesion on the right labia. There is no lesion on the left labia.       Right adnexum displays no mass. Left adnexum displays no mass.    Genitourinary Comments: Cervix with evidence of prior LEEP excision. No gross visible lesions. Vagina and parametria free of any visible or palpable tumor. Very mobile pelvic organs.              Musculoskeletal: Normal range of motion and moves all extremeties.      Lymphadenopathy:     She has no cervical adenopathy. No inguinal adenopathy noted on the right or left side.        Right: No supraclavicular adenopathy present.        Left: No supraclavicular adenopathy present.    Neurological: She is alert and oriented to person, place, and time.    Skin: Skin is warm and dry. No rash noted.    Psychiatric: She has a normal mood and affect.       Assessment:     1. Anxiety about health    2. Malignant neoplasm of overlapping sites of cervix        Plan:     Orders Placed This Encounter   Procedures    NM PET CT FDG Skull Base to Mid Thigh           Clinical Stage IB based on exam, size of LEEP specimen and positive margins. I discussed the natural history of HPV and cervical cancer. Will obtain PET for metastatic survey. We initiated discussions regarding treatment for cervical cancers to include surgery or chemoradiation. Further treatment planning pending metastatic imaging.     I spent  approximately 60 minutes reviewing the available records and evaluating the patient, out of which over 50% of the time was spent face to face with the patient in counseling and coordinating this patient's care.

## 2023-12-07 ENCOUNTER — TELEPHONE (OUTPATIENT)
Dept: SMOKING CESSATION | Facility: CLINIC | Age: 63
End: 2023-12-07
Payer: MEDICARE

## 2023-12-07 NOTE — TELEPHONE ENCOUNTER
1st attempt left message regarding smoking cessation 6 month telephone follow up for quit 1 episode.

## 2023-12-08 ENCOUNTER — HOSPITAL ENCOUNTER (OUTPATIENT)
Dept: RADIOLOGY | Facility: HOSPITAL | Age: 63
Discharge: HOME | End: 2023-12-08
Attending: OBSTETRICS & GYNECOLOGY
Payer: MEDICARE

## 2023-12-08 DIAGNOSIS — C53.8 MALIGNANT NEOPLASM OF OVERLAPPING SITES OF CERVIX: ICD-10-CM

## 2023-12-08 LAB — GLUCOSE SERPL-MCNC: 119 MG/DL (ref 70–110)

## 2023-12-08 PROCEDURE — 78815 PET IMAGE W/CT SKULL-THIGH: CPT | Mod: 26,PI,, | Performed by: RADIOLOGY

## 2023-12-08 PROCEDURE — 78815 NM PET CT FDG SKULL BASE TO MID THIGH: ICD-10-PCS | Mod: 26,PI,, | Performed by: RADIOLOGY

## 2023-12-08 PROCEDURE — A9552 F18 FDG: HCPCS | Mod: PN

## 2023-12-08 NOTE — PROGRESS NOTES
PET Imaging Questionnaire    Are you a Diabetic? Recent Blood Sugar level? No    Are you anemic? Bone Marrow Stimulation Meds? No    Have you had a CT Scan, if so when & where was your last one? Yes -     Have you had a PET Scan, if so when & where was your last one? No    Chemotherapy or currently on Chemotherapy? No    Radiation therapy? No    Surgical History:   Past Surgical History:   Procedure Laterality Date    APPENDECTOMY  1976    CONIZATION OF CERVIX USING LOOP ELECTROSURGICAL EXCISION PROCEDURE (LEEP) N/A 11/27/2023    Procedure: LEEP CONIZATION, CERVIX AND OTHER INDICATED PROCEDURES, Endocervical Curettage;  Surgeon: Phoenix Fragoso MD;  Location: [facility] OR;  Service: OB/GYN;  Laterality: N/A;    ELBOW SURGERY Right 1998    FINGER MASS EXCISION Left 05/25/2023    Procedure: EXCISION, MASS, FINGER;  Surgeon: Migel Richards MD;  Location: [facility] OR;  Service: Orthopedics;  Laterality: Left;        Have you been fasting for at least 6 hours? Yes    Is there any chance you may be pregnant or breastfeeding? No    Assay: 13.85 MCi@:8:07   Injection Site: RT ARM    Residual: 1.18 mCi@: 8:09   Technologist: Jay Patel Injected:12.67mCi

## 2023-12-09 ENCOUNTER — PATIENT MESSAGE (OUTPATIENT)
Dept: GYNECOLOGIC ONCOLOGY | Facility: CLINIC | Age: 63
End: 2023-12-09
Payer: MEDICARE

## 2023-12-09 ENCOUNTER — TELEPHONE (OUTPATIENT)
Dept: GYNECOLOGIC ONCOLOGY | Facility: CLINIC | Age: 63
End: 2023-12-09
Payer: MEDICARE

## 2023-12-11 ENCOUNTER — TELEPHONE (OUTPATIENT)
Dept: GYNECOLOGIC ONCOLOGY | Facility: CLINIC | Age: 63
End: 2023-12-11
Payer: MEDICARE

## 2023-12-11 DIAGNOSIS — C53.9 CERVICAL CANCER: Primary | ICD-10-CM

## 2023-12-11 DIAGNOSIS — C53.8 MALIGNANT NEOPLASM OF OVERLAPPING SITES OF CERVIX: ICD-10-CM
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
## 2023-12-13 ENCOUNTER — TELEPHONE (OUTPATIENT)
Dept: PREADMISSION TESTING | Facility: HOSPITAL | Age: 63
End: 2023-12-13

## 2023-12-13 NOTE — TELEPHONE ENCOUNTER
----- Message from Abby Ramirez RN sent at 12/12/2023  3:03 PM CST -----  Surgery date: 1/22/2024  PreOp appt:  12/14/2023  Surgeon:  Wil    Please call Pt and schedule the following preop appts:    [facility]  POC  Lab  EKG  Or NP    Thank you!  Abby

## 2023-12-14 ENCOUNTER — OFFICE VISIT (OUTPATIENT)
Dept: GYNECOLOGIC ONCOLOGY | Facility: CLINIC | Age: 63
End: 2023-12-14
Payer: MEDICARE

## 2023-12-14 VITALS
BODY MASS INDEX: 27.92 KG/M2 | DIASTOLIC BLOOD PRESSURE: 88 MMHG | RESPIRATION RATE: 16 BRPM | HEIGHT: 66 IN | HEART RATE: 96 BPM | SYSTOLIC BLOOD PRESSURE: 149 MMHG | WEIGHT: 173.75 LBS | TEMPERATURE: 97 F | OXYGEN SATURATION: 97 %

## 2023-12-14 DIAGNOSIS — C53.8 MALIGNANT NEOPLASM OF OVERLAPPING SITES OF CERVIX: Primary | ICD-10-CM

## 2023-12-14 PROCEDURE — 3044F HG A1C LEVEL LT 7.0%: CPT | Mod: CPTII,S$GLB,, | Performed by: OBSTETRICS & GYNECOLOGY

## 2023-12-14 PROCEDURE — 3008F PR BODY MASS INDEX (BMI) DOCUMENTED: ICD-10-PCS | Mod: CPTII,S$GLB,, | Performed by: OBSTETRICS & GYNECOLOGY

## 2023-12-14 PROCEDURE — 99214 PR OFFICE/OUTPT VISIT, EST, LEVL IV, 30-39 MIN: ICD-10-PCS | Mod: 24,S$GLB,, | Performed by: OBSTETRICS & GYNECOLOGY

## 2023-12-14 PROCEDURE — 1159F MED LIST DOCD IN RCRD: CPT | Mod: CPTII,S$GLB,, | Performed by: OBSTETRICS & GYNECOLOGY

## 2023-12-14 PROCEDURE — 1159F PR MEDICATION LIST DOCUMENTED IN MEDICAL RECORD: ICD-10-PCS | Mod: CPTII,S$GLB,, | Performed by: OBSTETRICS & GYNECOLOGY

## 2023-12-14 PROCEDURE — 3079F PR MOST RECENT DIASTOLIC BLOOD PRESSURE 80-89 MM HG: ICD-10-PCS | Mod: CPTII,S$GLB,, | Performed by: OBSTETRICS & GYNECOLOGY

## 2023-12-14 PROCEDURE — 3008F BODY MASS INDEX DOCD: CPT | Mod: CPTII,S$GLB,, | Performed by: OBSTETRICS & GYNECOLOGY

## 2023-12-14 PROCEDURE — 3079F DIAST BP 80-89 MM HG: CPT | Mod: CPTII,S$GLB,, | Performed by: OBSTETRICS & GYNECOLOGY

## 2023-12-14 PROCEDURE — 3077F PR MOST RECENT SYSTOLIC BLOOD PRESSURE >= 140 MM HG: ICD-10-PCS | Mod: CPTII,S$GLB,, | Performed by: OBSTETRICS & GYNECOLOGY

## 2023-12-14 PROCEDURE — 3044F PR MOST RECENT HEMOGLOBIN A1C LEVEL <7.0%: ICD-10-PCS | Mod: CPTII,S$GLB,, | Performed by: OBSTETRICS & GYNECOLOGY

## 2023-12-14 PROCEDURE — 99999 PR PBB SHADOW E&M-EST. PATIENT-LVL III: CPT | Mod: PBBFAC,,, | Performed by: OBSTETRICS & GYNECOLOGY

## 2023-12-14 PROCEDURE — 3077F SYST BP >= 140 MM HG: CPT | Mod: CPTII,S$GLB,, | Performed by: OBSTETRICS & GYNECOLOGY

## 2023-12-14 PROCEDURE — 99214 OFFICE O/P EST MOD 30 MIN: CPT | Mod: 24,S$GLB,, | Performed by: OBSTETRICS & GYNECOLOGY

## 2023-12-14 PROCEDURE — 99999 PR PBB SHADOW E&M-EST. PATIENT-LVL III: ICD-10-PCS | Mod: PBBFAC,,, | Performed by: OBSTETRICS & GYNECOLOGY

## 2023-12-14 NOTE — PROGRESS NOTES
Subjective:      Patient ID: Regina Brown is a 63 y.o. female.    Chief Complaint: Pre-op Exam      HPI  Presents today for follow up treatment planning.     PET 2023  - Circumferential hypermetabolism of the cervix consistent with the patient's known squamous cell carcinoma.  There is no significant extension of abnormal hypermetabolism into the surrounding soft tissues and there is no evidence of locoregional lymphadenopathy or distant metastatic disease..    ____________________________  Referred by Dr. Fragoso for newly diagnosed squamous cell carcinoma of the cervix.      Pap smear from 2023 noted atypical squamous cells can not exclude a high-grade intraepithelial lesion. HPV was positive for high-risk type 16. Negative for type 18.      Colposcopic biopsies 10/4/2023  Final Pathologic Diagnosis 1.  Endocervical curettage: Fragments of ectocervix with severe squamous dysplasia (GIA 3/HSIL) admixed with scant benign endocervix   2. Cervix at 5, biopsy:     Severe squamous dysplasia (GIA 3/HSIL)       Underwent LEEP 2023   1. CERVIX, CONIZATION:   - INVASIVE, MODERATE TO POORLY DIFFERENTIATED SQUAMOUS CELL CARCINOMA   - MARGINS POSITIVE FOR CARCINOMA     2. ENDOCERVIX, CURETTAGE:   - FRAGMENTS OF SQUAMOUS CELL CARCINOMA      Prior abdominal surgeries include open appendectomy as a child.  x 3.   Review of Systems   Constitutional:  Negative for appetite change, chills, fatigue and fever.   HENT:  Negative for mouth sores.    Respiratory:  Negative for cough and shortness of breath.    Cardiovascular:  Negative for leg swelling.   Gastrointestinal:  Negative for abdominal pain, blood in stool, constipation and diarrhea.   Endocrine: Negative for cold intolerance.   Genitourinary:  Negative for dysuria and vaginal bleeding.   Musculoskeletal:  Negative for myalgias.   Skin:  Negative for rash.   Allergic/Immunologic: Negative.    Neurological:  Negative for weakness and numbness.    Hematological:  Negative for adenopathy. Does not bruise/bleed easily.   Psychiatric/Behavioral:  Negative for confusion.        Objective:   Physical Exam:   Constitutional: She is oriented to person, place, and time. She appears well-developed and well-nourished.    HENT:   Head: Normocephalic and atraumatic.    Eyes: Pupils are equal, round, and reactive to light. EOM are normal.    Neck: No thyromegaly present.    Cardiovascular:  Normal rate, regular rhythm and intact distal pulses.             Pulmonary/Chest: Effort normal and breath sounds normal. No respiratory distress. She has no wheezes.        Abdominal: Soft. Bowel sounds are normal. She exhibits no distension and no mass. There is no abdominal tenderness.             Musculoskeletal: Normal range of motion and moves all extremeties.      Lymphadenopathy:     She has no cervical adenopathy.        Right: No supraclavicular adenopathy present.        Left: No supraclavicular adenopathy present.    Neurological: She is alert and oriented to person, place, and time.    Skin: Skin is warm and dry. No rash noted.    Psychiatric: She has a normal mood and affect.       Assessment:     1. Malignant neoplasm of overlapping sites of cervix        Plan:   No orders of the defined types were placed in this encounter.    I discussed with the patient definitive therapy for cervical cancer, which can consist of a radical hysterectomy or radiation therapy.  The advantage of surgery would be avoiding radiation therapy.  However, approximately 15-25% of patients who have stage IB1 tumors will be found to have shant metastasis. Other risk factors that might warrant postoperative radiation might also be found in approximately 40% of patients.  The patient was interested in pursuing surgery.  I outlined a plan that  would consist of an exploratory laparotomy, with sentinel lymph node mapping and lymph node excision. If this demonstrates no obvious evidence of  malignancy, a radical hysterectomy will be performed.  If, however, there is evidence of pelvic shant metastasis, the radical hysterectomy will be abandoned.    The risks, benefits, and indications of the procedure were discussed with the patient and her family members if present.  These included bleeding, transfusion, infection, damage to surrounding tissues (bowel, bladder, ureter), wound separation, lymphedema, conversion to laparotomy if laparoscopic, perioperative cardiac events, VTE, pneumonia, and possible death. I specifically discussed the risks of immediate and subsequent long-term bladder atony and the need to initiate radiation with abdominal muscle contracture, bleeding, infection and urinary fistula. She voiced understanding, all questions were answered and consents were signed.      I spent approximately 30 minutes reviewing the available records and evaluating the patient, out of which over 50% of the time was spent face to face with the patient in counseling and coordinating this patient's care.

## 2023-12-25 RX ORDER — FAMOTIDINE 20 MG/1
20 TABLET, FILM COATED ORAL
Status: CANCELLED | OUTPATIENT
Start: 2023-12-25

## 2023-12-25 RX ORDER — HEPARIN SODIUM 5000 [USP'U]/ML
5000 INJECTION, SOLUTION INTRAVENOUS; SUBCUTANEOUS ONCE
Status: CANCELLED | OUTPATIENT
Start: 2024-01-22

## 2023-12-25 RX ORDER — SODIUM CHLORIDE 9 MG/ML
INJECTION, SOLUTION INTRAVENOUS CONTINUOUS
Status: CANCELLED | OUTPATIENT
Start: 2023-12-25

## 2023-12-25 RX ORDER — MUPIROCIN 20 MG/G
OINTMENT TOPICAL
Status: CANCELLED | OUTPATIENT
Start: 2023-12-25

## 2024-01-09 NOTE — DISCHARGE INSTRUCTIONS
Your surgery has been scheduled for:____1/22/2024______________________________________    You should report to:  ____[facility], located on the [place] side of the first floor of the           [facility] ([phone])  __x__[facility], located on the [facility] side of the            Second floor of the [facility] ([phone])  ____3rd Floor [facility] located on the [facility] side of the [facility] [phone]  ____[facility]: located [address] [place] [address]. [address]     Please Note   Tell your doctor if you take Aspirin, products containing Aspirin, herbal medications  or blood thinners, such as Coumadin, Ticlid, or Plavix.  (Consult your provider regarding holding or stopping before surgery).  Arrange for someone to drive you home following surgery.  You will not be allowed to leave the surgical facility alone or drive yourself home following sedation and anesthesia.          Before Surgery  Stop taking all herbal medications, vitamins, and supplements 7 days prior to surgery  No Motrin/Advil (Ibuprofen) 7 days before surgery  No Aleve (Naproxen) 7 days before surgery  Stop Taking Asprin, products containing Asprin ___7__days before surgery  Stop taking blood thinners_______days before surgery  No Goody's/BC  Powder 7 days before surgery  Refrain from drinking alcoholic beverages for 24hours before and after surgery  Stop or limit smoking ___7______days before surgery  You may take Tylenol for pain    Night before Surgery  Do not eat or drink after midnight  Take a shower or bath (shower is recommended).  Bathe with Hibiclens soap or an antibacterial soap from the neck down.  If not supplied by your surgeon, hibiclens soap will need to be purchased over the counter in pharmacy.  Rinse soap off thoroughly.  Shampoo your hair with your regular shampoo    The Day  of Surgery  Take another bath or shower with hibiclens or any antibacterial soap, to reduce the chance of infection.  Take heart and blood pressure medications with a small sip of water, as advised by the perioperative team.  Do not take fluid pills  You may brush your teeth and rinse your mouth, but do not swall any additional water.   Do not apply perfumes, powder, body lotions or deodorant on the day of surgery.  Nail polish should be removed.  Do not wear makeup or moisturizer  Wear comfortable clothes, such as a button front shirt and loose fitting pants.  Leave all jewelry, including body piercings, and valuables at home.    Bring any devices you will neeed after surgery such as crutches or canes.  If you have sleep apnea, please bring your CPAP machine  In the event that your physical condition changes including the onset of a cold or respiratory illness, or if you have to delay or cancel your surgery, please notify your surgeon.         Anesthesia: General Anesthesia     You are watched continuously during your procedure by your anesthesia provider.     Youre due to have surgery. During surgery, youll be given medicine called anesthesia or anesthetic. This will keep you comfortable and pain-free. Your anesthesia provider will use general anesthesia.  What is general anesthesia?  General anesthesia puts you into a state like deep sleep. It goes into the bloodstream (IV anesthetics), into the lungs (gas anesthetics), or both. You feel nothing during the procedure. You will not remember it. During the procedure, the anesthesia provider monitors you continuously. He or she checks your heart rate and rhythm, blood pressure, breathing, and blood oxygen.  IV anesthetics. IV anesthetics are given through an IV line in your arm. Theyre often given first. This is so you are asleep before a gas anesthetic is started. Some kinds of IV anesthetics relieve pain. Others relax you. Your doctor will decide which kind is  best in your case.  Gas anesthetics. Gas anesthetics are breathed into the lungs. They are often used to keep you asleep. They can be given through a facemask or a tube placed in your larynx or trachea (breathing tube).  If you have a facemask, your anesthesia provider will most likely place it over your nose and mouth while youre still awake. Youll breathe oxygen through the mask as your IV anesthetic is started. Gas anesthetic may be added through the mask.  If you have a tube in the larynx or trachea, it will be inserted into your throat after youre asleep.  Anesthesia tools and medicines  You will likely have:  IV anesthetics. These are put into an IV line into your bloodstream.  Gas anesthetics. You breathe these anesthetics into your lungs, where they pass into your bloodstream.  Pulse oximeter. This is a small clip that is attached to the end of your finger. This measures your blood oxygen level.  Electrocardiography leads (electrodes). These are small sticky pads that are placed on your chest. They record your heart rate and rhythm.  Blood pressure cuff. This reads your blood pressure.  Risks and possible complications  General anesthesia has some risks. These include:  Breathing problems  Nausea and vomiting  Sore throat or hoarseness (usually temporary)  Allergic reaction to the anesthetic  Irregular heartbeat (rare)  Cardiac arrest (rare)   Anesthesia safety  Follow all instructions you are given for how long not to eat or drink before your procedure.  Be sure your doctor knows what medicines and drugs you take. This includes over-the-counter medicines, herbs, supplements, alcohol or other drugs. You will be asked when those were last taken.  Have an adult family member or friend drive you home after the procedure.  For the first 24 hours after your surgery:  Do not drive or use heavy equipment.  Do not make important decisions or sign legal documents. If important decisions or signing legal documents  is necessary during the first 24 hours after surgery, have a trusted family member or spouse act on your behalf.  Avoid alcohol.  Have a responsible adult stay with you. He or she can watch for problems and help keep you safe.  Date Last Reviewed: 12/1/2016  © 1614-9982 [employer]. [address] [place] [address], [place] [address]. All rights reserved. This information is not intended as a substitute for professional medical care. Always follow your healthcare professional's instructions.

## 2024-01-10 ENCOUNTER — HOSPITAL ENCOUNTER (OUTPATIENT)
Dept: CARDIOLOGY | Facility: CLINIC | Age: 64
Discharge: HOME | End: 2024-01-10
Payer: MEDICARE

## 2024-01-10 ENCOUNTER — OFFICE VISIT (OUTPATIENT)
Dept: INTERNAL MEDICINE | Facility: CLINIC | Age: 64
End: 2024-01-10
Payer: MEDICARE

## 2024-01-10 ENCOUNTER — ANESTHESIA EVENT (OUTPATIENT)
Dept: SURGERY | Facility: HOSPITAL | Age: 64
DRG: 740 | End: 2024-01-10
Payer: MEDICARE

## 2024-01-10 ENCOUNTER — HOSPITAL ENCOUNTER (OUTPATIENT)
Dept: PREADMISSION TESTING | Facility: HOSPITAL | Age: 64
Discharge: HOME | End: 2024-01-10
Attending: OBSTETRICS & GYNECOLOGY
Payer: MEDICARE

## 2024-01-10 VITALS
HEIGHT: 66 IN | HEART RATE: 86 BPM | TEMPERATURE: 98 F | DIASTOLIC BLOOD PRESSURE: 82 MMHG | WEIGHT: 165 LBS | OXYGEN SATURATION: 98 % | RESPIRATION RATE: 18 BRPM | BODY MASS INDEX: 26.52 KG/M2 | SYSTOLIC BLOOD PRESSURE: 132 MMHG

## 2024-01-10 DIAGNOSIS — R09.89 BILATERAL CAROTID BRUITS: ICD-10-CM

## 2024-01-10 DIAGNOSIS — E78.5 HYPERLIPIDEMIA, UNSPECIFIED HYPERLIPIDEMIA TYPE: ICD-10-CM

## 2024-01-10 DIAGNOSIS — Z87.891 FORMER SMOKER: ICD-10-CM

## 2024-01-10 DIAGNOSIS — F41.9 ANXIETY: ICD-10-CM

## 2024-01-10 DIAGNOSIS — G89.29 NECK PAIN, CHRONIC: ICD-10-CM

## 2024-01-10 DIAGNOSIS — M54.2 NECK PAIN, CHRONIC: ICD-10-CM

## 2024-01-10 DIAGNOSIS — M19.90 OSTEOARTHRITIS, UNSPECIFIED OSTEOARTHRITIS TYPE, UNSPECIFIED SITE: ICD-10-CM

## 2024-01-10 DIAGNOSIS — R01.1 HEART MURMUR: ICD-10-CM

## 2024-01-10 DIAGNOSIS — Z01.818 PREOPERATIVE TESTING: ICD-10-CM

## 2024-01-10 DIAGNOSIS — G47.30 SLEEP APNEA, UNSPECIFIED TYPE: ICD-10-CM

## 2024-01-10 DIAGNOSIS — Z01.818 PREOP EXAMINATION: Primary | ICD-10-CM

## 2024-01-10 DIAGNOSIS — R23.2 HOT FLASHES: ICD-10-CM

## 2024-01-10 PROCEDURE — 93005 ELECTROCARDIOGRAM TRACING: CPT | Mod: S$GLB,,, | Performed by: ANESTHESIOLOGY

## 2024-01-10 PROCEDURE — 93010 ELECTROCARDIOGRAM REPORT: CPT | Mod: S$GLB,,, | Performed by: INTERNAL MEDICINE

## 2024-01-10 PROCEDURE — 99999 PR PBB SHADOW E&M-EST. PATIENT-LVL IV: CPT | Mod: PBBFAC,,, | Performed by: HOSPITALIST

## 2024-01-10 PROCEDURE — 93005 ELECTROCARDIOGRAM TRACING: CPT | Mod: PBBFAC | Performed by: INTERNAL MEDICINE

## 2024-01-10 PROCEDURE — 99214 OFFICE O/P EST MOD 30 MIN: CPT | Mod: S$PBB,,, | Performed by: HOSPITALIST

## 2024-01-10 PROCEDURE — 99214 OFFICE O/P EST MOD 30 MIN: CPT | Mod: PBBFAC | Performed by: HOSPITALIST
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
## 2024-01-10 NOTE — ASSESSMENT & PLAN NOTE
She quit tobacco smoking in July of 2023 after smoking for a long time  She quit has she wanted to quit     I suggested to consider stopping  smoking tobacco for its benefits in the [facility] operative period and in the long term    I  Informed about risk of wound healing problem ,infection,lung complications,thrombosis with tobacco use     Suggested quitting tobacco     Offered tobacco cessation service     Not known to have COPD, Bronchitis, Emphysema, wheezing , chronic phlegm   No inhaler, oxygen use      She feels good about her breathing

## 2024-01-10 NOTE — ASSESSMENT & PLAN NOTE
She has a heart murmur and to her understanding her family members had heart murmur also but they do not have bad heart problems  She is known to have a heart murmur since she was born  She has not known to have rheumatic fever    She had surgeries in the past in the heart murmur did not cause any  Problem to her during previous surgeries and pregnancies     she was physically active person and still stays physically active with no problems like chest pain chest tightness short of breath dizziness lightheadedness    We discussed echocardiogram but given that she is doing very well we have deferred it at this time    After discussing further I have requested 2D echocardiogram

## 2024-01-10 NOTE — OUTPATIENT SUBJECTIVE & OBJECTIVE
"Outpatient Subjective & Objective     Chief complaint-Preoperative evaluation, Perioperative Medical management, complication reduction plan     Active cardiac conditions- none    Revised cardiac risk index predictors- high-risk type of surgery    Functional capacity -Examples of physical activity , she stays active,  can take 1 flight of stairs----- She can undertake all the above activities without  chest pain,chest tightness, Shortness of breath ,dizziness,lightheadedness making her exercise tolerance more,   than 4 Mets.       Review of Systems   Constitutional:  Negative for chills and fever.        No unusual weight changes     HENT:          Sleep apnea   Eyes:         No new visual changes   Respiratory:          No cough , phlegm    No Hemoptysis   Cardiovascular:         As noted   Gastrointestinal:         No overt GI/ blood losses  Bowel movements- Regular   Not pregnant to her understanding   Endocrine:        Prednisone use > 20 mg daily for 3 weeks- None   Genitourinary:  Negative for dysuria.        No urinary hesitancy    Musculoskeletal:         As above      Skin:  Negative for rash.   Neurological:  Negative for syncope.        No unilateral weakness   Hematological:         Current use of Anticoagulants  None   Psychiatric/Behavioral:             No SI/HI   No vascular stenting      No family history on file.      No anesthesia, bleeding, cardiac problems , PONVwith previous surgeries/procedures.  Medications and Allergies reviewed in epic.   FH- No anesthesia,bleeding / venous thrombosis ,  in family    Physical Exam  Blood pressure 132/82, pulse 86, temperature 98.1 °F (36.7 °C), temperature source Oral, resp. rate 18, height 5' 6" (1.676 m), weight 74.8 kg (165 lb), SpO2 98 %.  I offered a blanket and the presence of a chaperone during physical examination   She was comfortable to proceed with the exam without the the presence of a chaperone        Physical Exam  Constitutional- Vitals - " Body mass index is 26.63 kg/m².,   Vitals:    01/10/24 0859   BP: 132/82   Pulse: 86   Resp: 18   Temp: 98.1 °F (36.7 °C)     General appearance-Conscious,Coherent  Eyes- No conjunctival icterus,pupils  round  and reactive to light   ENT-Oral cavity- moist  and  upper denture   , Hearing grossly normal   Neck- No thyromegaly ,Trachea -central, No jugular venous distension,    Rt carotid bruit  and  Left carotid bruit   Cardiovascular -Heart Sounds- auscultated both reclined as well as sitting,  Normal ,  systolic murmur aortic area , and  systolic murmur pulmonary area   , No gallop rhythm   Respiratory - Normal Respiratory Effort, Normal breath sounds,  no wheeze , and  no forced expiratory wheeze    Peripheral pitting pedal edema-- none , no calf pain   Gastrointestinal -Soft abdomen, No palpable masses, Non Tender,Liver,Spleen not palpable. No-- free fluid and shifting dullness  Musculoskeletal- No finger Clubbing. Strength grossly normal   Lymphatic-No Palpable cervical, axillary,Inguinal lymphadenopathy   Psychiatric - normal effect,Orientation  Rt Dorsalis pedis pulses-palpable    Lt Dorsalis pedis pulses- palpable   Rt Posterior tibial pulses -palpable   Left posterior tibial pulses -palpable   Miscellaneous -  no asterixis,  no dupuytren's contracture,  no renal bruit, and  Tattoo   Investigations  Lab and Imaging have been reviewed in [facility].    Review of Medicine tests    EKG- I had independently reviewed the EKG from-- today      Review of clinical lab tests:  Lab Results   Component Value Date    CREATININE 0.8 01/10/2024    HGB 14.7 01/10/2024     01/10/2024         Review of old records- Was done and information gathered regards to events leading to surgery and health conditions of significance in the perioperative period.    Outpatient Subjective & Objective
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
## 2024-01-10 NOTE — ASSESSMENT & PLAN NOTE
He has hot flashes and vaginal dryness  They do not seem to be troubling her  He has mood swings  I suggested follow-up

## 2024-01-10 NOTE — ASSESSMENT & PLAN NOTE
She has carotid bruit him on both sides, right more than the left   To her understanding it appears that she is known to have that but she has not had an ultrasound scan   She has had no stroke or mini-stroke   She has on a statin   I am checking a carotid duplex

## 2024-01-10 NOTE — ASSESSMENT & PLAN NOTE
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

## 2024-01-10 NOTE — ASSESSMENT & PLAN NOTE
She gets anxious when she thinks about her cancer and she takes Xanax as needed not on a regular basis  She takes Xanax about once a week and I discussed driving safety and being on heights and machinery

## 2024-01-10 NOTE — ASSESSMENT & PLAN NOTE
Sleep apnea .Uses CPAP .Suggested bringing for hospital use . Informed the risk of worsening sleep apnea in the perioperative period and suggest using CPAP  use any time in 24 hrs ( day or night )for planned sleep       I suggest  caution with usage of medication that can cause respiratory suppression in the perioperative period  potential ramifications of untreated sleep apnea, which could include daytime sleepiness, hypertension, heart disease and stroke were discussed    Avoidance of  supine sleep, weight gain , alcoholic beverages , care with , sedative , CNS depressant use indicated  since all of these can worsen MARYSE

## 2024-01-10 NOTE — ASSESSMENT & PLAN NOTE
She lost about 12 lb in the last 12 months intentionally    Weight related conditions     Known to have     Hyperlipidemia   Sleep apnea       Not troubled with / Not known to have       Type 2 Diabetes   Acid reflux   Fatty liver       Encouraged weight loss

## 2024-01-18 ENCOUNTER — TELEPHONE (OUTPATIENT)
Dept: GYNECOLOGIC ONCOLOGY | Facility: CLINIC | Age: 64
End: 2024-01-18
Payer: MEDICARE
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
## 2024-01-22 ENCOUNTER — ANESTHESIA (OUTPATIENT)
Dept: SURGERY | Facility: HOSPITAL | Age: 64
DRG: 740 | End: 2024-01-22
Payer: MEDICARE

## 2024-01-22 ENCOUNTER — HOSPITAL ENCOUNTER (INPATIENT)
Facility: HOSPITAL | Age: 64
LOS: 2 days | Discharge: HOME | DRG: 740 | End: 2024-01-24
Attending: OBSTETRICS & GYNECOLOGY | Admitting: OBSTETRICS & GYNECOLOGY
Payer: MEDICARE

## 2024-01-22 DIAGNOSIS — C53.8 MALIGNANT NEOPLASM OF OVERLAPPING SITES OF CERVIX: ICD-10-CM

## 2024-01-22 DIAGNOSIS — Z01.818 PREOPERATIVE TESTING: ICD-10-CM

## 2024-01-22 DIAGNOSIS — Z90.710 STATUS POST HYSTERECTOMY: Primary | ICD-10-CM

## 2024-01-22 LAB
ABO + RH BLD: NORMAL
BLD GP AB SCN CELLS X3 SERPL QL: NORMAL
SARS-COV-2 RDRP RESP QL NAA+PROBE: NEGATIVE
SPECIMEN OUTDATE: NORMAL

## 2024-01-22 PROCEDURE — 27201037 HC PRESSURE MONITORING SET UP

## 2024-01-22 PROCEDURE — 25000003 PHARM REV CODE 250: Performed by: OBSTETRICS & GYNECOLOGY

## 2024-01-22 PROCEDURE — 63600175 PHARM REV CODE 636 W HCPCS: Performed by: STUDENT IN AN ORGANIZED HEALTH CARE EDUCATION/TRAINING PROGRAM

## 2024-01-22 PROCEDURE — 36620 INSERTION CATHETER ARTERY: CPT | Mod: 59,,, | Performed by: ANESTHESIOLOGY

## 2024-01-22 PROCEDURE — D9220A PRA ANESTHESIA: Mod: ,,, | Performed by: ANESTHESIOLOGY

## 2024-01-22 PROCEDURE — 71000016 HC POSTOP RECOV ADDL HR: Performed by: OBSTETRICS & GYNECOLOGY

## 2024-01-22 PROCEDURE — 36415 COLL VENOUS BLD VENIPUNCTURE: CPT | Performed by: OBSTETRICS & GYNECOLOGY

## 2024-01-22 PROCEDURE — 63600175 PHARM REV CODE 636 W HCPCS

## 2024-01-22 PROCEDURE — 63600175 PHARM REV CODE 636 W HCPCS: Performed by: OBSTETRICS & GYNECOLOGY

## 2024-01-22 PROCEDURE — 86850 RBC ANTIBODY SCREEN: CPT | Performed by: OBSTETRICS & GYNECOLOGY

## 2024-01-22 PROCEDURE — 25000003 PHARM REV CODE 250: Performed by: STUDENT IN AN ORGANIZED HEALTH CARE EDUCATION/TRAINING PROGRAM

## 2024-01-22 PROCEDURE — 88305 TISSUE EXAM BY PATHOLOGIST: CPT | Mod: 59 | Performed by: PATHOLOGY

## 2024-01-22 PROCEDURE — 88342 IMHCHEM/IMCYTCHM 1ST ANTB: CPT | Mod: 26,,, | Performed by: PATHOLOGY

## 2024-01-22 PROCEDURE — 25000003 PHARM REV CODE 250: Performed by: ANESTHESIOLOGY

## 2024-01-22 PROCEDURE — 20600001 HC STEP DOWN PRIVATE ROOM

## 2024-01-22 PROCEDURE — 58200 EXTENSIVE HYSTERECTOMY: CPT | Mod: 58,,, | Performed by: OBSTETRICS & GYNECOLOGY

## 2024-01-22 PROCEDURE — 0UT70ZZ RESECTION OF BILATERAL FALLOPIAN TUBES, OPEN APPROACH: ICD-10-PCS | Performed by: OBSTETRICS & GYNECOLOGY

## 2024-01-22 PROCEDURE — 37000008 HC ANESTHESIA 1ST 15 MINUTES: Performed by: OBSTETRICS & GYNECOLOGY

## 2024-01-22 PROCEDURE — U0002 COVID-19 LAB TEST NON-CDC: HCPCS | Performed by: OBSTETRICS & GYNECOLOGY

## 2024-01-22 PROCEDURE — 0UT90ZZ RESECTION OF UTERUS, OPEN APPROACH: ICD-10-PCS | Performed by: OBSTETRICS & GYNECOLOGY

## 2024-01-22 PROCEDURE — 71000015 HC POSTOP RECOV 1ST HR: Performed by: OBSTETRICS & GYNECOLOGY

## 2024-01-22 PROCEDURE — 88307 TISSUE EXAM BY PATHOLOGIST: CPT | Mod: 26,,, | Performed by: PATHOLOGY

## 2024-01-22 PROCEDURE — 88341 IMHCHEM/IMCYTCHM EA ADD ANTB: CPT | Mod: 26,,, | Performed by: PATHOLOGY

## 2024-01-22 PROCEDURE — 37000009 HC ANESTHESIA EA ADD 15 MINS: Performed by: OBSTETRICS & GYNECOLOGY

## 2024-01-22 PROCEDURE — 07BC0ZX EXCISION OF PELVIS LYMPHATIC, OPEN APPROACH, DIAGNOSTIC: ICD-10-PCS | Performed by: OBSTETRICS & GYNECOLOGY

## 2024-01-22 PROCEDURE — 88341 IMHCHEM/IMCYTCHM EA ADD ANTB: CPT | Mod: 59 | Performed by: PATHOLOGY

## 2024-01-22 PROCEDURE — 58200 EXTENSIVE HYSTERECTOMY: CPT | Mod: 82,58,, | Performed by: OBSTETRICS & GYNECOLOGY

## 2024-01-22 PROCEDURE — 36000708 HC OR TIME LEV III 1ST 15 MIN: Performed by: OBSTETRICS & GYNECOLOGY

## 2024-01-22 PROCEDURE — 71000033 HC RECOVERY, INTIAL HOUR: Performed by: OBSTETRICS & GYNECOLOGY

## 2024-01-22 PROCEDURE — 88309 TISSUE EXAM BY PATHOLOGIST: CPT | Mod: 26,,, | Performed by: PATHOLOGY

## 2024-01-22 PROCEDURE — 63600175 PHARM REV CODE 636 W HCPCS: Performed by: ANESTHESIOLOGY

## 2024-01-22 PROCEDURE — 88309 TISSUE EXAM BY PATHOLOGIST: CPT | Performed by: PATHOLOGY

## 2024-01-22 PROCEDURE — 36000709 HC OR TIME LEV III EA ADD 15 MIN: Performed by: OBSTETRICS & GYNECOLOGY

## 2024-01-22 PROCEDURE — 0UT20ZZ RESECTION OF BILATERAL OVARIES, OPEN APPROACH: ICD-10-PCS | Performed by: OBSTETRICS & GYNECOLOGY

## 2024-01-22 PROCEDURE — 27201423 OPTIME MED/SURG SUP & DEVICES STERILE SUPPLY: Performed by: OBSTETRICS & GYNECOLOGY

## 2024-01-22 PROCEDURE — 25000003 PHARM REV CODE 250

## 2024-01-22 PROCEDURE — 88360 TUMOR IMMUNOHISTOCHEM/MANUAL: CPT | Performed by: PATHOLOGY

## 2024-01-22 PROCEDURE — 88342 IMHCHEM/IMCYTCHM 1ST ANTB: CPT | Performed by: PATHOLOGY

## 2024-01-22 RX ORDER — HYDROMORPHONE HYDROCHLORIDE 1 MG/ML
0.2 INJECTION, SOLUTION INTRAMUSCULAR; INTRAVENOUS; SUBCUTANEOUS EVERY 5 MIN PRN
Status: DISCONTINUED | OUTPATIENT
Start: 2024-01-22 | End: 2024-01-22 | Stop reason: HOSPADM

## 2024-01-22 RX ORDER — OXYCODONE HYDROCHLORIDE 5 MG/1
5 TABLET ORAL EVERY 4 HOURS PRN
Status: DISCONTINUED | OUTPATIENT
Start: 2024-01-22 | End: 2024-01-24 | Stop reason: HOSPADM

## 2024-01-22 RX ORDER — ATORVASTATIN CALCIUM 40 MG/1
80 TABLET, FILM COATED ORAL NIGHTLY
Status: DISCONTINUED | OUTPATIENT
Start: 2024-01-22 | End: 2024-01-24 | Stop reason: HOSPADM

## 2024-01-22 RX ORDER — IBUPROFEN 200 MG
1 TABLET ORAL DAILY
Status: DISCONTINUED | OUTPATIENT
Start: 2024-01-22 | End: 2024-01-24 | Stop reason: HOSPADM

## 2024-01-22 RX ORDER — ACETAMINOPHEN 500 MG
1000 TABLET ORAL EVERY 6 HOURS
Status: DISCONTINUED | OUTPATIENT
Start: 2024-01-22 | End: 2024-01-24 | Stop reason: HOSPADM

## 2024-01-22 RX ORDER — ACETAMINOPHEN 500 MG
1000 TABLET ORAL
Status: COMPLETED | OUTPATIENT
Start: 2024-01-22 | End: 2024-01-22

## 2024-01-22 RX ORDER — ROCURONIUM BROMIDE 10 MG/ML
INJECTION, SOLUTION INTRAVENOUS
Status: DISCONTINUED | OUTPATIENT
Start: 2024-01-22 | End: 2024-01-22

## 2024-01-22 RX ORDER — IBUPROFEN 600 MG/1
600 TABLET ORAL EVERY 6 HOURS
Status: DISCONTINUED | OUTPATIENT
Start: 2024-01-23 | End: 2024-01-24 | Stop reason: HOSPADM

## 2024-01-22 RX ORDER — OXYCODONE HYDROCHLORIDE 10 MG/1
10 TABLET ORAL EVERY 4 HOURS PRN
Status: DISCONTINUED | OUTPATIENT
Start: 2024-01-22 | End: 2024-01-24 | Stop reason: HOSPADM

## 2024-01-22 RX ORDER — MUPIROCIN 20 MG/G
OINTMENT TOPICAL
Status: DISCONTINUED | OUTPATIENT
Start: 2024-01-22 | End: 2024-01-22 | Stop reason: HOSPADM

## 2024-01-22 RX ORDER — ONDANSETRON HYDROCHLORIDE 2 MG/ML
4 INJECTION, SOLUTION INTRAVENOUS EVERY 6 HOURS PRN
Status: DISCONTINUED | OUTPATIENT
Start: 2024-01-22 | End: 2024-01-24 | Stop reason: HOSPADM

## 2024-01-22 RX ORDER — PROCHLORPERAZINE EDISYLATE 5 MG/ML
5 INJECTION INTRAMUSCULAR; INTRAVENOUS EVERY 6 HOURS PRN
Status: DISCONTINUED | OUTPATIENT
Start: 2024-01-22 | End: 2024-01-24 | Stop reason: HOSPADM

## 2024-01-22 RX ORDER — CEFAZOLIN SODIUM 1 G/3ML
INJECTION, POWDER, FOR SOLUTION INTRAMUSCULAR; INTRAVENOUS
Status: DISCONTINUED | OUTPATIENT
Start: 2024-01-22 | End: 2024-01-22

## 2024-01-22 RX ORDER — NALOXONE HCL 0.4 MG/ML
0.02 VIAL (ML) INJECTION
Status: DISCONTINUED | OUTPATIENT
Start: 2024-01-22 | End: 2024-01-24 | Stop reason: HOSPADM

## 2024-01-22 RX ORDER — SENNOSIDES 8.6 MG/1
8.6 TABLET ORAL 2 TIMES DAILY
Status: DISCONTINUED | OUTPATIENT
Start: 2024-01-22 | End: 2024-01-24 | Stop reason: HOSPADM

## 2024-01-22 RX ORDER — MIDAZOLAM HYDROCHLORIDE 1 MG/ML
INJECTION, SOLUTION INTRAMUSCULAR; INTRAVENOUS
Status: DISCONTINUED | OUTPATIENT
Start: 2024-01-22 | End: 2024-01-22

## 2024-01-22 RX ORDER — LIDOCAINE HYDROCHLORIDE 20 MG/ML
INJECTION INTRAVENOUS
Status: DISCONTINUED | OUTPATIENT
Start: 2024-01-22 | End: 2024-01-22

## 2024-01-22 RX ORDER — KETAMINE HCL IN 0.9 % NACL 50 MG/5 ML
SYRINGE (ML) INTRAVENOUS
Status: DISCONTINUED | OUTPATIENT
Start: 2024-01-22 | End: 2024-01-22

## 2024-01-22 RX ORDER — TALC
6 POWDER (GRAM) TOPICAL NIGHTLY PRN
Status: DISCONTINUED | OUTPATIENT
Start: 2024-01-22 | End: 2024-01-24 | Stop reason: HOSPADM

## 2024-01-22 RX ORDER — FAMOTIDINE 20 MG/1
20 TABLET, FILM COATED ORAL
Status: DISCONTINUED | OUTPATIENT
Start: 2024-01-22 | End: 2024-01-22 | Stop reason: HOSPADM

## 2024-01-22 RX ORDER — DEXMEDETOMIDINE HYDROCHLORIDE 100 UG/ML
INJECTION, SOLUTION INTRAVENOUS
Status: DISCONTINUED | OUTPATIENT
Start: 2024-01-22 | End: 2024-01-22

## 2024-01-22 RX ORDER — KETOROLAC TROMETHAMINE 30 MG/ML
15 INJECTION, SOLUTION INTRAMUSCULAR; INTRAVENOUS EVERY 6 HOURS
Status: DISPENSED | OUTPATIENT
Start: 2024-01-22 | End: 2024-01-23

## 2024-01-22 RX ORDER — HYDROMORPHONE HYDROCHLORIDE 1 MG/ML
0.4 INJECTION, SOLUTION INTRAMUSCULAR; INTRAVENOUS; SUBCUTANEOUS
Status: DISCONTINUED | OUTPATIENT
Start: 2024-01-22 | End: 2024-01-24 | Stop reason: HOSPADM

## 2024-01-22 RX ORDER — LORAZEPAM 0.5 MG/1
0.5 TABLET ORAL EVERY 8 HOURS PRN
Status: DISCONTINUED | OUTPATIENT
Start: 2024-01-22 | End: 2024-01-24 | Stop reason: HOSPADM

## 2024-01-22 RX ORDER — CYCLOBENZAPRINE HCL 5 MG
10 TABLET ORAL 3 TIMES DAILY PRN
Status: DISCONTINUED | OUTPATIENT
Start: 2024-01-22 | End: 2024-01-24 | Stop reason: HOSPADM

## 2024-01-22 RX ORDER — DEXAMETHASONE SODIUM PHOSPHATE 4 MG/ML
INJECTION, SOLUTION INTRA-ARTICULAR; INTRALESIONAL; INTRAMUSCULAR; INTRAVENOUS; SOFT TISSUE
Status: DISCONTINUED | OUTPATIENT
Start: 2024-01-22 | End: 2024-01-22

## 2024-01-22 RX ORDER — SODIUM CHLORIDE 9 MG/ML
INJECTION, SOLUTION INTRAVENOUS CONTINUOUS
Status: DISCONTINUED | OUTPATIENT
Start: 2024-01-22 | End: 2024-01-23

## 2024-01-22 RX ORDER — PHENYLEPHRINE HYDROCHLORIDE 10 MG/ML
INJECTION INTRAVENOUS
Status: DISCONTINUED | OUTPATIENT
Start: 2024-01-22 | End: 2024-01-22

## 2024-01-22 RX ORDER — HYDRALAZINE HYDROCHLORIDE 20 MG/ML
10 INJECTION INTRAMUSCULAR; INTRAVENOUS EVERY 6 HOURS PRN
Status: DISCONTINUED | OUTPATIENT
Start: 2024-01-22 | End: 2024-01-24 | Stop reason: HOSPADM

## 2024-01-22 RX ORDER — HALOPERIDOL 5 MG/ML
0.5 INJECTION INTRAMUSCULAR EVERY 10 MIN PRN
Status: DISCONTINUED | OUTPATIENT
Start: 2024-01-22 | End: 2024-01-22 | Stop reason: HOSPADM

## 2024-01-22 RX ORDER — ADHESIVE BANDAGE
30 BANDAGE TOPICAL 2 TIMES DAILY
Status: DISCONTINUED | OUTPATIENT
Start: 2024-01-22 | End: 2024-01-24 | Stop reason: HOSPADM

## 2024-01-22 RX ORDER — FENTANYL CITRATE 50 UG/ML
INJECTION, SOLUTION INTRAMUSCULAR; INTRAVENOUS
Status: DISCONTINUED | OUTPATIENT
Start: 2024-01-22 | End: 2024-01-22

## 2024-01-22 RX ORDER — HEPARIN SODIUM 5000 [USP'U]/ML
5000 INJECTION, SOLUTION INTRAVENOUS; SUBCUTANEOUS EVERY 8 HOURS
Status: DISCONTINUED | OUTPATIENT
Start: 2024-01-22 | End: 2024-01-23

## 2024-01-22 RX ORDER — SODIUM CHLORIDE 0.9 % (FLUSH) 0.9 %
10 SYRINGE (ML) INJECTION
Status: DISCONTINUED | OUTPATIENT
Start: 2024-01-22 | End: 2024-01-22 | Stop reason: HOSPADM

## 2024-01-22 RX ORDER — ONDANSETRON HYDROCHLORIDE 2 MG/ML
INJECTION, SOLUTION INTRAVENOUS
Status: DISCONTINUED | OUTPATIENT
Start: 2024-01-22 | End: 2024-01-22

## 2024-01-22 RX ORDER — SIMETHICONE 80 MG
1 TABLET,CHEWABLE ORAL 3 TIMES DAILY PRN
Status: DISCONTINUED | OUTPATIENT
Start: 2024-01-22 | End: 2024-01-24 | Stop reason: HOSPADM

## 2024-01-22 RX ORDER — PROPOFOL 10 MG/ML
VIAL (ML) INTRAVENOUS
Status: DISCONTINUED | OUTPATIENT
Start: 2024-01-22 | End: 2024-01-22

## 2024-01-22 RX ORDER — HEPARIN SODIUM 5000 [USP'U]/ML
5000 INJECTION, SOLUTION INTRAVENOUS; SUBCUTANEOUS ONCE
Status: COMPLETED | OUTPATIENT
Start: 2024-01-22 | End: 2024-01-22

## 2024-01-22 RX ORDER — SODIUM CHLORIDE, SODIUM LACTATE, POTASSIUM CHLORIDE, CALCIUM CHLORIDE 600; 310; 30; 20 MG/100ML; MG/100ML; MG/100ML; MG/100ML
INJECTION, SOLUTION INTRAVENOUS CONTINUOUS
Status: DISCONTINUED | OUTPATIENT
Start: 2024-01-22 | End: 2024-01-23

## 2024-01-22 RX ADMIN — HYDROMORPHONE HYDROCHLORIDE 0.2 MG: 1 INJECTION, SOLUTION INTRAMUSCULAR; INTRAVENOUS; SUBCUTANEOUS at 11:01

## 2024-01-22 RX ADMIN — KETOROLAC TROMETHAMINE 15 MG: 30 INJECTION, SOLUTION INTRAMUSCULAR; INTRAVENOUS at 12:01

## 2024-01-22 RX ADMIN — FENTANYL CITRATE 50 MCG: 50 INJECTION, SOLUTION INTRAMUSCULAR; INTRAVENOUS at 08:01

## 2024-01-22 RX ADMIN — OXYCODONE HYDROCHLORIDE 5 MG: 5 TABLET ORAL at 11:01

## 2024-01-22 RX ADMIN — ROCURONIUM BROMIDE 20 MG: 10 INJECTION, SOLUTION INTRAVENOUS at 08:01

## 2024-01-22 RX ADMIN — PHENYLEPHRINE HYDROCHLORIDE 50 MCG: 10 INJECTION INTRAVENOUS at 10:01

## 2024-01-22 RX ADMIN — PHENYLEPHRINE HYDROCHLORIDE 50 MCG: 10 INJECTION INTRAVENOUS at 07:01

## 2024-01-22 RX ADMIN — DEXMEDETOMIDINE 8 MCG: 200 INJECTION, SOLUTION INTRAVENOUS at 10:01

## 2024-01-22 RX ADMIN — ONDANSETRON 4 MG: 2 INJECTION INTRAMUSCULAR; INTRAVENOUS at 10:01

## 2024-01-22 RX ADMIN — HEPARIN SODIUM 5000 UNITS: 5000 INJECTION INTRAVENOUS; SUBCUTANEOUS at 03:01

## 2024-01-22 RX ADMIN — HYDROMORPHONE HYDROCHLORIDE 0.2 MG: 1 INJECTION, SOLUTION INTRAMUSCULAR; INTRAVENOUS; SUBCUTANEOUS at 02:01

## 2024-01-22 RX ADMIN — PHENYLEPHRINE HYDROCHLORIDE 100 MCG: 10 INJECTION INTRAVENOUS at 10:01

## 2024-01-22 RX ADMIN — FENTANYL CITRATE 50 MCG: 50 INJECTION, SOLUTION INTRAMUSCULAR; INTRAVENOUS at 07:01

## 2024-01-22 RX ADMIN — PROPOFOL 100 MG: 10 INJECTION, EMULSION INTRAVENOUS at 07:01

## 2024-01-22 RX ADMIN — HYDROMORPHONE HYDROCHLORIDE 0.2 MG: 1 INJECTION, SOLUTION INTRAMUSCULAR; INTRAVENOUS; SUBCUTANEOUS at 12:01

## 2024-01-22 RX ADMIN — Medication 20 MG: at 08:01

## 2024-01-22 RX ADMIN — SODIUM CHLORIDE: 9 INJECTION, SOLUTION INTRAVENOUS at 07:01

## 2024-01-22 RX ADMIN — OXYCODONE HYDROCHLORIDE 5 MG: 5 TABLET ORAL at 04:01

## 2024-01-22 RX ADMIN — ACETAMINOPHEN 1000 MG: 500 TABLET ORAL at 05:01

## 2024-01-22 RX ADMIN — Medication 10 MG: at 09:01

## 2024-01-22 RX ADMIN — DEXAMETHASONE SODIUM PHOSPHATE 8 MG: 4 INJECTION, SOLUTION INTRAMUSCULAR; INTRAVENOUS at 07:01

## 2024-01-22 RX ADMIN — MIDAZOLAM HYDROCHLORIDE 1 MG: 1 INJECTION, SOLUTION INTRAMUSCULAR; INTRAVENOUS at 07:01

## 2024-01-22 RX ADMIN — PHENYLEPHRINE HYDROCHLORIDE 50 MCG: 10 INJECTION INTRAVENOUS at 08:01

## 2024-01-22 RX ADMIN — CEFAZOLIN 2 G: 330 INJECTION, POWDER, FOR SOLUTION INTRAMUSCULAR; INTRAVENOUS at 07:01

## 2024-01-22 RX ADMIN — SODIUM CHLORIDE: 0.9 INJECTION, SOLUTION INTRAVENOUS at 05:01

## 2024-01-22 RX ADMIN — MUPIROCIN: 20 OINTMENT TOPICAL at 05:01

## 2024-01-22 RX ADMIN — Medication 10 MG: at 08:01

## 2024-01-22 RX ADMIN — PROPOFOL 50 MG: 10 INJECTION, EMULSION INTRAVENOUS at 07:01

## 2024-01-22 RX ADMIN — SENNOSIDES 8.6 MG: 8.6 TABLET, FILM COATED ORAL at 08:01

## 2024-01-22 RX ADMIN — MAGNESIUM HYDROXIDE 2400 MG: 400 SUSPENSION ORAL at 08:01

## 2024-01-22 RX ADMIN — FENTANYL CITRATE 50 MCG: 50 INJECTION, SOLUTION INTRAMUSCULAR; INTRAVENOUS at 09:01

## 2024-01-22 RX ADMIN — ROCURONIUM BROMIDE 20 MG: 10 INJECTION, SOLUTION INTRAVENOUS at 09:01

## 2024-01-22 RX ADMIN — LIDOCAINE HYDROCHLORIDE 100 MG: 20 INJECTION INTRAVENOUS at 07:01

## 2024-01-22 RX ADMIN — SUGAMMADEX 200 MG: 100 INJECTION, SOLUTION INTRAVENOUS at 10:01

## 2024-01-22 RX ADMIN — HEPARIN SODIUM 5000 UNITS: 5000 INJECTION INTRAVENOUS; SUBCUTANEOUS at 10:01

## 2024-01-22 RX ADMIN — ATORVASTATIN CALCIUM 80 MG: 40 TABLET, FILM COATED ORAL at 08:01

## 2024-01-22 RX ADMIN — ACETAMINOPHEN 1000 MG: 500 TABLET ORAL at 12:01

## 2024-01-22 RX ADMIN — SODIUM CHLORIDE, SODIUM GLUCONATE, SODIUM ACETATE, POTASSIUM CHLORIDE, MAGNESIUM CHLORIDE, SODIUM PHOSPHATE, DIBASIC, AND POTASSIUM PHOSPHATE: .53; .5; .37; .037; .03; .012; .00082 INJECTION, SOLUTION INTRAVENOUS at 07:01

## 2024-01-22 RX ADMIN — Medication 10 MG: at 10:01

## 2024-01-22 RX ADMIN — SODIUM CHLORIDE, POTASSIUM CHLORIDE, SODIUM LACTATE AND CALCIUM CHLORIDE: 600; 310; 30; 20 INJECTION, SOLUTION INTRAVENOUS at 12:01

## 2024-01-22 RX ADMIN — HEPARIN SODIUM 5000 UNITS: 5000 INJECTION, SOLUTION INTRAVENOUS; SUBCUTANEOUS at 05:01

## 2024-01-22 RX ADMIN — ACETAMINOPHEN 1000 MG: 500 TABLET ORAL at 11:01

## 2024-01-22 RX ADMIN — PROPOFOL 40 MG: 10 INJECTION, EMULSION INTRAVENOUS at 07:01

## 2024-01-22 RX ADMIN — KETOROLAC TROMETHAMINE 15 MG: 30 INJECTION, SOLUTION INTRAMUSCULAR; INTRAVENOUS at 11:01

## 2024-01-22 RX ADMIN — ROCURONIUM BROMIDE 15 MG: 10 INJECTION, SOLUTION INTRAVENOUS at 09:01

## 2024-01-22 RX ADMIN — ROCURONIUM BROMIDE 50 MG: 10 INJECTION, SOLUTION INTRAVENOUS at 07:01

## 2024-01-22 NOTE — OP NOTE
[facility] - Surgery ([facility])  OBGYN  Operative Note    SUMMARY     Date of Procedure: 1/22/2024     Procedure: Procedure(s) (LRB):  HYSTERECTOMY, RADICAL, ABDOMINAL (N/A)  SALPINGO-OOPHORECTOMY, BILATERAL (N/A)  MAPPING, LYMPH NODE, SENTINEL (N/A)  BIOPSY, LYMPH NODE (Bilateral)       Surgeon(s) and Role:     * Ana De La Torre MD - Primary     * Adriana Sunshine MD    A qualified resident was not available to assist    Pre-Operative Diagnosis: Cervical cancer [C53.9]  Malignant neoplasm of overlapping sites of cervix [C53.8]    Post-Operative Diagnosis: Post-Op Diagnosis Codes:     * Cervical cancer [C53.9]     * Malignant neoplasm of overlapping sites of cervix [C53.8]      ANESTHESIA:  General endotracheal anesthesia.     SPECIMENS REMOVED:  1.  Uterus and cervix, bilateral fallopian tubes and ovaries, parametria, upper vagina.  2.  Right sentinel pelvic lymph node  3.  Left sentinel pelvic lymph node.     ESTIMATED BLOOD LOSS:  250 mL.     COMPLICATIONS:  None.     FINDINGS:  Exam under anesthesia showed and small 1cm lesion at the cervical os, evidence of prior LEEP. There was no vaginal or parametrial involvement on pelvic exam, mobile pelvic structures. 6cm uterus with normal contour. No evidence of obvious intraperitoneal disease. No grossly suspicious or enlarged nodes. +fluorescent sentinel lymph nodes bilaterally, 1 external iliac on the right and 1 external iliac on the left. Bilateral fallopian tubes and ovaries appeared normal. At least 1.5cm vaginal margin circumferentially on hysterectomy specimen.      PROCEDURE IN DETAIL:  The patient was taken to the Operating Room.  Informed consent had been obtained.  She underwent general endotracheal anesthesia without difficulty, was prepped and draped in the normal sterile fashion in dorsal lithotomy position.  Timeout was performed.  All parties agreed to the planned procedure. Perioperative antibiotics were administered.  Miranda catheter was placed under  sterile conditions. Two cervical injections of 1.25mg/ml ICG were performed both superficial and deep at the 3 and 9 o'clock position. A total of 4cc was used.      A midline vertical skin incision was made and carried down to the underlying layer of fascia.  The fascia was incised in the midline and the incision extended. The rectus muscles were identified in the midline and these were . Peritoneum was identified, entered sharply with Metzenbaum scissors and the incision extended superiorly and inferiorly with good visualization of bowel and bladder.  Bookwalter retractor was assembled and bowel was packed away with moist laparotomy sponges.     We began the procedure by identifying the round ligaments.  These were suture ligated and transected.  We opened both the anterior and posterior leaf of the broad ligament, facilitating access to the retroperitoneum.  We completely developed the paravesical and pararectal spaces bilaterally.  Palpation of the parametria revealed no abnormalities. There were no grossly enlarged lymph nodes. Ureters were placed on vessel loops. We identified +fluorescent sentinel pelvic lymph nodes bilaterally and these were excised, location as above. Bilateral IP ligaments were then isolated, transected and doubly suture ligated.       We further opened the anterior leaf of the broad ligament circumferentially and the proper plane for the bladder flap was identified and the bladder was gently reflected off the anterior aspect of the uterus and the cervix. We skeletonized the ureters bilaterally. We then skeletonized the superior vesicle artery. We identified the uterine artery at its origin, coming off of the anterior branch of the hypogastric artery. This was clipped.  It was doubly suture ligated and transected bilaterally.  We then unroofed the ureter by carrying the uterine artery up and over at the ureteric tunnel. We continued to dissect the bladder off of the anterior vagina  until the level where bilateral ureters entered the bladder trigone.      We then scored the posterior cul-de-sac peritoneum and reflected the rectum away from the posterior vagina and the rectovaginal septum.  We then transected the uterosacral ligaments bilaterally, elevating the uterus, the cervix and the upper vagina out of the pelvis.  We then clamped the parametria bilaterally with ligasure device, cauterized and transected this.  We then used two curved clamps to come across the upper third of the vagina. The uterus, cervix, parametria, upper vagina and bilateral fallopian tubes and ovaries were then amputated and handed off to pathology.  The specimen was inspected with good margins noted circumferentially around the cervix in the vagina. The vaginal cuff was then closed with an 0-Vicryl suture in a running locked fashion.          At this point, the procedure was deemed complete.  We copiously irrigated the pelvis. It was noted to be hemostatic. Bilateral ureters were inspected; both noted to vermiculate, both equally and briskly.  Laparotomy sponges were removed from the abdomen. Bookwalter retractor was taken down. The fascia was then reapproximated with a #1 looped PDS in a running fashion.  Subcutaneous tissue was irrigated, cleared of all clot and debris and rendered hemostatic. Subcutaneous tissue was reapproximated with 2-0 vicryl suture in a running fashion. Skin was closed with 4-0 Monocryl in a subcuticular fashion and topped with sterile dressing. The patient tolerated the procedure well. Sponge, lap, needle and instrument counts were correct x2 as reported by the circulating nurse.  She was awakened from anesthesia and taken to recovery in stable condition.

## 2024-01-22 NOTE — ANESTHESIA PROCEDURE NOTES
Intubation    Date/Time: 1/22/2024 7:23 AM    Performed by: Leeanna Talavera MD  Authorized by: Leeanna Talavera MD    Intubation:     Induction:  Intravenous    Intubated:  Postinduction    Mask Ventilation:  Easy mask    Attempts:  1    Attempted By:  Staff anesthesiologist    Method of Intubation:  Video laryngoscopy    Blade:  Flower 3    Laryngeal View Grade: Grade I - full view of cords      Difficult Airway Encountered?: No      Complications:  None    Airway Device:  Oral endotracheal tube    Airway Device Size:  7.0    Style/Cuff Inflation:  Cuffed (inflated to minimal occlusive pressure)    Tube secured:  22    Secured at:  The teeth    Placement Verified By:  Capnometry and Revisualization with laryngoscopy    Complicating Factors:  None    Findings Post-Intubation:  BS equal bilateral and atraumatic/condition of teeth unchanged

## 2024-01-22 NOTE — ANESTHESIA POSTPROCEDURE EVALUATION
Anesthesia Post Evaluation    Patient: Regina Brown    Procedure(s) Performed: Procedure(s) (LRB):  HYSTERECTOMY, RADICAL, ABDOMINAL (N/A)  SALPINGO-OOPHORECTOMY, BILATERAL (N/A)  MAPPING, LYMPH NODE, SENTINEL (N/A)  BIOPSY, LYMPH NODE (Bilateral)    Final Anesthesia Type: general      Patient location during evaluation: PACU  Patient participation: Yes- Able to Participate  Level of consciousness: awake and alert  Post-procedure vital signs: reviewed and stable  Pain management: adequate  Airway patency: patent    PONV status at discharge: No PONV  Anesthetic complications: no      Cardiovascular status: hemodynamically stable  Respiratory status: spontaneous ventilation  Follow-up not needed.              Vitals Value Taken Time   /61 01/22/24 1131   Temp 36.3 °C (97.3 °F) 01/22/24 1115   Pulse 68 01/22/24 1142   Resp 20 01/22/24 1142   SpO2 96 % 01/22/24 1142   Vitals shown include unvalidated device data.      No case tracking events are documented in the log.      Pain/Susan Score: Pain Rating Prior to Med Admin: 6 (1/22/2024 11:27 AM)  Susan Score: 9 (1/22/2024 11:34 AM)

## 2024-01-22 NOTE — CARE UPDATE
"Afternoon Assessment:    To bedside for PM rounds. Patient resting comfortably in PACU bed. Reports pain is well controlled. Feeling hungry. Has not ambulated. +flatus. Voiding via clinton.    Temp:  [97.3 °F (36.3 °C)-97.9 °F (36.6 °C)] 97.3 °F (36.3 °C)  Pulse:  [66-95] 79  Resp:  [16-28] 17  SpO2:  [95 %-100 %] 95 %  BP: (113-138)/(58-89) 126/63    PE:  Abdomen: soft, appropriate postop tenderness. Bandage in place without shadowing    Labs:  No results for input(s): "WBC", "RBC", "HGB", "HCT", "PLT", "MCV", "MCH", "MCHC" in the last 24 hours.    A/P:  - Continue routine postop care   - Clinton to stay in place until day of discharge     Mackenzie Reese MD  OBGYN, PGY-3      "

## 2024-01-22 NOTE — OPERATIVE NOTE ADDENDUM
Certification of Assistant at Surgery       Surgery Date: 1/22/2024     Participating Surgeons:  Surgeon(s) and Role:     * Ana De La Torre MD - Primary     * Adriana Sunshine MD    Procedures:  Procedure(s) (LRB):  HYSTERECTOMY, RADICAL, ABDOMINAL (N/A)  SALPINGO-OOPHORECTOMY, BILATERAL (N/A)  MAPPING, LYMPH NODE, SENTINEL (N/A)  BIOPSY, LYMPH NODE (Bilateral)    Assistant Surgeon's Certification of Necessity:  I understand that section 1842 (b) (6) (d) of the Social Security Act generally prohibits Medicare Part B reasonable charge payment for the services of assistants at surgery in teaching hospitals when qualified residents are available to furnish such services. I certify that the services for which payment is claimed were medically necessary, and that no qualified resident was available to perform the services. I further understand that these services are subject to post-payment review by the Medicare carrier.      Adriana Sunshine MD    01/22/2024  2:53 PM

## 2024-01-22 NOTE — H&P
Regina Brown is 63 y.o.  presenting for scheduled radical hysterectomy.    Temp:  [97.3 °F (36.3 °C)-97.9 °F (36.6 °C)] 97.3 °F (36.3 °C)  Pulse:  [66-95] 66  Resp:  [18-28] 18  SpO2:  [98 %-100 %] 98 %  BP: (113-138)/(61-89) 113/61    General: NAD, alert, oriented, cooperative  HEENT: NCAT, EOM grossly intact  Lungs: Normal WOB  Heart: regular rate  Abdomen: soft, nondistended, nontender, no rebound or guarding    Consents in chart. Pre-operative heparin given. All questions answered and concerns addressed. To OR for planned procedure.      Mackenzie Reese MD  OBGYN, PGY-3      Subjective:      Patient ID: Regina Brown is a 63 y.o. female.     Chief Complaint: Pre-op Exam        HPI  Presents today for follow up treatment planning.      PET 2023  - Circumferential hypermetabolism of the cervix consistent with the patient's known squamous cell carcinoma.  There is no significant extension of abnormal hypermetabolism into the surrounding soft tissues and there is no evidence of locoregional lymphadenopathy or distant metastatic disease..     ____________________________  Referred by Dr. Fragoso for newly diagnosed squamous cell carcinoma of the cervix.      Pap smear from 2023 noted atypical squamous cells can not exclude a high-grade intraepithelial lesion. HPV was positive for high-risk type 16. Negative for type 18.      Colposcopic biopsies 10/4/2023  Final Pathologic Diagnosis 1.  Endocervical curettage: Fragments of ectocervix with severe squamous dysplasia (GIA 3/HSIL) admixed with scant benign endocervix   2. Cervix at 5, biopsy:     Severe squamous dysplasia (GIA 3/HSIL)       Underwent LEEP 2023   1. CERVIX, CONIZATION:   - INVASIVE, MODERATE TO POORLY DIFFERENTIATED SQUAMOUS CELL CARCINOMA   - MARGINS POSITIVE FOR CARCINOMA     2. ENDOCERVIX, CURETTAGE:   - FRAGMENTS OF SQUAMOUS CELL CARCINOMA      Prior abdominal surgeries include open appendectomy as a child.  x 3.   Review  of Systems   Constitutional:  Negative for appetite change, chills, fatigue and fever.   HENT:  Negative for mouth sores.    Respiratory:  Negative for cough and shortness of breath.    Cardiovascular:  Negative for leg swelling.   Gastrointestinal:  Negative for abdominal pain, blood in stool, constipation and diarrhea.   Endocrine: Negative for cold intolerance.   Genitourinary:  Negative for dysuria and vaginal bleeding.   Musculoskeletal:  Negative for myalgias.   Skin:  Negative for rash.   Allergic/Immunologic: Negative.    Neurological:  Negative for weakness and numbness.   Hematological:  Negative for adenopathy. Does not bruise/bleed easily.   Psychiatric/Behavioral:  Negative for confusion.          Objective:   Physical Exam:   Constitutional: She is oriented to person, place, and time. She appears well-developed and well-nourished.    HENT:   Head: Normocephalic and atraumatic.    Eyes: Pupils are equal, round, and reactive to light. EOM are normal.    Neck: No thyromegaly present.    Cardiovascular:  Normal rate, regular rhythm and intact distal pulses.             Pulmonary/Chest: Effort normal and breath sounds normal. No respiratory distress. She has no wheezes.         Abdominal: Soft. Bowel sounds are normal. She exhibits no distension and no mass. There is no abdominal tenderness.             Musculoskeletal: Normal range of motion and moves all extremeties.      Lymphadenopathy:     She has no cervical adenopathy.        Right: No supraclavicular adenopathy present.        Left: No supraclavicular adenopathy present.    Neurological: She is alert and oriented to person, place, and time.    Skin: Skin is warm and dry. No rash noted.    Psychiatric: She has a normal mood and affect.         Assessment:      1. Malignant neoplasm of overlapping sites of cervix          Plan:   No orders of the defined types were placed in this encounter.     I discussed with the patient definitive therapy for  cervical cancer, which can consist of a radical hysterectomy or radiation therapy.  The advantage of surgery would be avoiding radiation therapy.  However, approximately 15-25% of patients who have stage IB1 tumors will be found to have shant metastasis. Other risk factors that might warrant postoperative radiation might also be found in approximately 40% of patients.  The patient was interested in pursuing surgery.  I outlined a plan that  would consist of an exploratory laparotomy, with sentinel lymph node mapping and lymph node excision. If this demonstrates no obvious evidence of malignancy, a radical hysterectomy will be performed.  If, however, there is evidence of pelvic shant metastasis, the radical hysterectomy will be abandoned.     The risks, benefits, and indications of the procedure were discussed with the patient and her family members if present.  These included bleeding, transfusion, infection, damage to surrounding tissues (bowel, bladder, ureter), wound separation, lymphedema, conversion to laparotomy if laparoscopic, perioperative cardiac events, VTE, pneumonia, and possible death. I specifically discussed the risks of immediate and subsequent long-term bladder atony and the need to initiate radiation with abdominal muscle contracture, bleeding, infection and urinary fistula. She voiced understanding, all questions were answered and consents were signed.       I spent approximately 30 minutes reviewing the available records and evaluating the patient, out of which over 50% of the time was spent face to face with the patient in counseling and coordinating this patient's care.

## 2024-01-22 NOTE — TRANSFER OF CARE
"Anesthesia Transfer of Care Note    Patient: Regina Brown    Procedure(s) Performed: Procedure(s) (LRB):  HYSTERECTOMY, RADICAL, ABDOMINAL (N/A)  SALPINGO-OOPHORECTOMY, BILATERAL (N/A)  MAPPING, LYMPH NODE, SENTINEL (N/A)  BIOPSY, LYMPH NODE (Bilateral)    Patient location: PACU    Anesthesia Type: general    Transport from OR: Transported from OR on 6-10 L/min O2 by face mask with adequate spontaneous ventilation    Post pain: adequate analgesia    Post assessment: no apparent anesthetic complications and tolerated procedure well    Post vital signs: stable    Level of consciousness: awake, alert and oriented    Nausea/Vomiting: no nausea/vomiting    Complications: none    Transfer of care protocol was followed      Last vitals: Visit Vitals  /89 (BP Location: Right arm)   Pulse 95   Temp 36.6 °C (97.9 °F) (Oral)   Resp 20   Ht 5' 6" (1.676 m)   Wt 76.9 kg (169 lb 8.5 oz)   SpO2 100%   Breastfeeding No   BMI 27.36 kg/m²     "

## 2024-01-22 NOTE — ANESTHESIA PROCEDURE NOTES
Arterial    Diagnosis: cervical cancer    Patient location during procedure: done in OR  Timeout: 1/22/2024 7:18 AM  Procedure end time: 1/22/2024 7:22 AM    Staffing  Authorizing Provider: Leeanna Talavera MD  Performing Provider: Aida Ortiz MD    Staffing  Performed by: Aida Ortiz MD  Authorized by: Leeanna Talavera MD    Anesthesiologist was present at the time of the procedure.    Preanesthetic Checklist  Completed: patient identified, IV checked, site marked, risks and benefits discussed, surgical consent, monitors and equipment checked, pre-op evaluation, timeout performed and anesthesia consent givenArterial  Skin Prep: chlorhexidine gluconate  Local Infiltration: lidocaine  Orientation: left  Location: radial    Catheter Size: 20 G  Catheter placement by Ultrasound guidance. Heme positive aspiration all ports.   Vessel Caliber: patent  Needle advanced into vessel with real time Ultrasound guidance.Insertion Attempts: 1  Assessment  Dressing: secured with tape and tegaderm  Patient: Tolerated well

## 2024-01-23 LAB
ANION GAP SERPL CALC-SCNC: 10 MMOL/L (ref 8–16)
BASOPHILS # BLD AUTO: 0.01 K/UL (ref 0–0.2)
BASOPHILS NFR BLD: 0.1 % (ref 0–1.9)
BUN SERPL-MCNC: 9 MG/DL (ref 8–23)
CALCIUM SERPL-MCNC: 9.1 MG/DL (ref 8.7–10.5)
CHLORIDE SERPL-SCNC: 108 MMOL/L (ref 95–110)
CO2 SERPL-SCNC: 22 MMOL/L (ref 23–29)
CREAT SERPL-MCNC: 0.7 MG/DL (ref 0.5–1.4)
DIFFERENTIAL METHOD BLD: ABNORMAL
EOSINOPHIL # BLD AUTO: 0 K/UL (ref 0–0.5)
EOSINOPHIL NFR BLD: 0 % (ref 0–8)
ERYTHROCYTE [DISTWIDTH] IN BLOOD BY AUTOMATED COUNT: 12.2 % (ref 11.5–14.5)
EST. GFR  (NO RACE VARIABLE): >60 ML/MIN/1.73 M^2
GLUCOSE SERPL-MCNC: 98 MG/DL (ref 70–110)
HCT VFR BLD AUTO: 38.3 % (ref 37–48.5)
HGB BLD-MCNC: 12.8 G/DL (ref 12–16)
IMM GRANULOCYTES # BLD AUTO: 0.04 K/UL (ref 0–0.04)
IMM GRANULOCYTES NFR BLD AUTO: 0.4 % (ref 0–0.5)
LYMPHOCYTES # BLD AUTO: 0.8 K/UL (ref 1–4.8)
LYMPHOCYTES NFR BLD: 9.1 % (ref 18–48)
MCH RBC QN AUTO: 31.8 PG (ref 27–31)
MCHC RBC AUTO-ENTMCNC: 33.4 G/DL (ref 32–36)
MCV RBC AUTO: 95 FL (ref 82–98)
MONOCYTES # BLD AUTO: 0.7 K/UL (ref 0.3–1)
MONOCYTES NFR BLD: 7.8 % (ref 4–15)
NEUTROPHILS # BLD AUTO: 7.5 K/UL (ref 1.8–7.7)
NEUTROPHILS NFR BLD: 82.6 % (ref 38–73)
NRBC BLD-RTO: 0 /100 WBC
PLATELET # BLD AUTO: 176 K/UL (ref 150–450)
PMV BLD AUTO: 11.7 FL (ref 9.2–12.9)
POTASSIUM SERPL-SCNC: 4.5 MMOL/L (ref 3.5–5.1)
RBC # BLD AUTO: 4.03 M/UL (ref 4–5.4)
SODIUM SERPL-SCNC: 140 MMOL/L (ref 136–145)
WBC # BLD AUTO: 9.12 K/UL (ref 3.9–12.7)

## 2024-01-23 PROCEDURE — 25000003 PHARM REV CODE 250: Performed by: STUDENT IN AN ORGANIZED HEALTH CARE EDUCATION/TRAINING PROGRAM

## 2024-01-23 PROCEDURE — 85025 COMPLETE CBC W/AUTO DIFF WBC: CPT | Performed by: STUDENT IN AN ORGANIZED HEALTH CARE EDUCATION/TRAINING PROGRAM

## 2024-01-23 PROCEDURE — 36415 COLL VENOUS BLD VENIPUNCTURE: CPT | Performed by: STUDENT IN AN ORGANIZED HEALTH CARE EDUCATION/TRAINING PROGRAM

## 2024-01-23 PROCEDURE — 99024 POSTOP FOLLOW-UP VISIT: CPT | Mod: ,,, | Performed by: OBSTETRICS & GYNECOLOGY

## 2024-01-23 PROCEDURE — 63600175 PHARM REV CODE 636 W HCPCS: Performed by: STUDENT IN AN ORGANIZED HEALTH CARE EDUCATION/TRAINING PROGRAM

## 2024-01-23 PROCEDURE — 20600001 HC STEP DOWN PRIVATE ROOM

## 2024-01-23 PROCEDURE — 25000003 PHARM REV CODE 250

## 2024-01-23 PROCEDURE — 99900035 HC TECH TIME PER 15 MIN (STAT)

## 2024-01-23 PROCEDURE — 80048 BASIC METABOLIC PNL TOTAL CA: CPT | Performed by: STUDENT IN AN ORGANIZED HEALTH CARE EDUCATION/TRAINING PROGRAM

## 2024-01-23 PROCEDURE — 94799 UNLISTED PULMONARY SVC/PX: CPT | Mod: XB

## 2024-01-23 RX ORDER — ACETAMINOPHEN 325 MG/1
650 TABLET ORAL EVERY 6 HOURS
Qty: 60 TABLET | Refills: 3 | Status: SHIPPED | OUTPATIENT
Start: 2024-01-23

## 2024-01-23 RX ORDER — ENOXAPARIN SODIUM 100 MG/ML
40 INJECTION SUBCUTANEOUS EVERY 24 HOURS
Status: DISCONTINUED | OUTPATIENT
Start: 2024-01-23 | End: 2024-01-24 | Stop reason: HOSPADM

## 2024-01-23 RX ORDER — IBUPROFEN 600 MG/1
600 TABLET ORAL EVERY 6 HOURS
Qty: 30 TABLET | Refills: 3 | Status: SHIPPED | OUTPATIENT
Start: 2024-01-23 | End: 2024-04-15

## 2024-01-23 RX ORDER — SENNOSIDES 8.6 MG/1
1 TABLET ORAL 2 TIMES DAILY
Qty: 30 TABLET | Refills: 3 | Status: SHIPPED | OUTPATIENT
Start: 2024-01-23 | End: 2024-04-15 | Stop reason: ALTCHOICE

## 2024-01-23 RX ORDER — SIMETHICONE 80 MG
80 TABLET,CHEWABLE ORAL 3 TIMES DAILY PRN
Qty: 30 TABLET | Refills: 3 | Status: SHIPPED | OUTPATIENT
Start: 2024-01-23 | End: 2024-04-15 | Stop reason: ALTCHOICE

## 2024-01-23 RX ORDER — PHENAZOPYRIDINE HYDROCHLORIDE 100 MG/1
100 TABLET, FILM COATED ORAL
Status: DISCONTINUED | OUTPATIENT
Start: 2024-01-23 | End: 2024-01-24 | Stop reason: HOSPADM

## 2024-01-23 RX ORDER — OXYCODONE HYDROCHLORIDE 5 MG/1
5 TABLET ORAL EVERY 4 HOURS PRN
Qty: 15 TABLET | Refills: 0 | Status: SHIPPED | OUTPATIENT
Start: 2024-01-23 | End: 2024-04-15 | Stop reason: ALTCHOICE

## 2024-01-23 RX ADMIN — SENNOSIDES 8.6 MG: 8.6 TABLET, FILM COATED ORAL at 08:01

## 2024-01-23 RX ADMIN — PHENAZOPYRIDINE HYDROCHLORIDE 100 MG: 100 TABLET ORAL at 09:01

## 2024-01-23 RX ADMIN — KETOROLAC TROMETHAMINE 15 MG: 30 INJECTION, SOLUTION INTRAMUSCULAR; INTRAVENOUS at 07:01

## 2024-01-23 RX ADMIN — PHENAZOPYRIDINE HYDROCHLORIDE 100 MG: 100 TABLET ORAL at 05:01

## 2024-01-23 RX ADMIN — PHENAZOPYRIDINE HYDROCHLORIDE 100 MG: 100 TABLET ORAL at 12:01

## 2024-01-23 RX ADMIN — OXYCODONE HYDROCHLORIDE 5 MG: 5 TABLET ORAL at 04:01

## 2024-01-23 RX ADMIN — ENOXAPARIN SODIUM 40 MG: 100 INJECTION SUBCUTANEOUS at 04:01

## 2024-01-23 RX ADMIN — ATORVASTATIN CALCIUM 80 MG: 40 TABLET, FILM COATED ORAL at 08:01

## 2024-01-23 RX ADMIN — SENNOSIDES 8.6 MG: 8.6 TABLET, FILM COATED ORAL at 09:01

## 2024-01-23 RX ADMIN — MAGNESIUM HYDROXIDE 2400 MG: 400 SUSPENSION ORAL at 09:01

## 2024-01-23 RX ADMIN — OXYCODONE HYDROCHLORIDE 10 MG: 10 TABLET ORAL at 05:01

## 2024-01-23 RX ADMIN — HEPARIN SODIUM 5000 UNITS: 5000 INJECTION INTRAVENOUS; SUBCUTANEOUS at 05:01

## 2024-01-23 RX ADMIN — MAGNESIUM HYDROXIDE 2400 MG: 400 SUSPENSION ORAL at 08:01

## 2024-01-23 RX ADMIN — ACETAMINOPHEN 1000 MG: 500 TABLET ORAL at 05:01

## 2024-01-23 RX ADMIN — IBUPROFEN 600 MG: 600 TABLET, FILM COATED ORAL at 12:01

## 2024-01-23 RX ADMIN — OXYCODONE HYDROCHLORIDE 10 MG: 10 TABLET ORAL at 08:01

## 2024-01-23 RX ADMIN — SIMETHICONE 80 MG: 80 TABLET, CHEWABLE ORAL at 04:01

## 2024-01-23 NOTE — NURSING
Pt arrived on unit via bed with SCDs, pt belongings, and IV pole. Pt is AAOx4; VSS; afebrile; rm air; slightly anxious but cooperative. LR infusing at 40 ml/h; denies pain, HA, CP, SOB, N/V/D; Miranda intact; midline incision noted; island border drsg CDI with scant dried drainage; pt amb with standby assist; glasses on; S1 and S2 noted with heart murmur; lungs clear dorinda; LBM 1/22; generalized bruising; uses CPAP at noc; bed locked and low with call light in reach; care continue

## 2024-01-23 NOTE — CARE UPDATE
Afternoon Assessment:    Patient doing well. Pain well controlled on PO pain medication. Tolerating PO w/o  nausea or vomiting. Passing gas and ambulating without difficulty.    Temp:  [98.2 °F (36.8 °C)-100 °F (37.8 °C)] 98.2 °F (36.8 °C)  Pulse:  [87-97] 89  Resp:  [8-37] 16  SpO2:  [92 %-99 %] 93 %  BP: (121-162)/(62-77) 144/63    PE:  Abdomen: Midline vertical incision with bandage in place. Minimal shadowing.   Abdomen soft and non-tender to palpation.    Labs:  Recent Labs   Lab 01/23/24  0403   WBC 9.12   RBC 4.03   HGB 12.8   HCT 38.3      MCV 95   MCH 31.8*   MCHC 33.4       A/P:  - Plan for removal of clinton with passive void trial in the AM  - Plan to remove bandage in AM   - - Patient meeting all post-op milestones, discharge tomorrow     Suzanne Berkowitz MD  Obstetrics and Gynecology, PGY-1

## 2024-01-23 NOTE — PROGRESS NOTES
Admit Assessment    Patient Identification  Regina Brown   :  1960  Admit Date:  2024  Attending Provider:  Ana De La Torre MD              Referral:   Pt was admitted to  with a diagnosis of <principal problem not specified>, and was admitted this hospital stay due to Cervical cancer [C53.9]  Malignant neoplasm of overlapping sites of cervix [C53.8]  Preoperative testing [Z01.818].   is involved was referred to the Social Work Department via routine referral.  Patient presents as a 63 y.o. year old single female.    Persons interviewed: patient    Living Situation:  pt. States that she resides at home with her sig other (Bryon Reyes-[phone]). She states that she has been very independent with all adl's prior to admission. Pt. Reports that she and sig other are both retired and that sig other will be able to help with needs.     Resides at [address] [place] [id number] [facility] [address], phone: [phone] (home).      (RETIRED) Functional Status Prior  Ambulation Prior: 0-->independent  Transferrin-->independent  Toiletin-->independent    Current or Past Agencies and Description of Services/Supplies    DME  Agency Name: none  Agency Phone Number: n/a       Home Health  Agency Name: none  Agency Phone Number: n/a  Services: n/a    IV Infusion  Agency Name: none  Agency Phone Number: n/a    Nutrition: oral    Outpatient Pharmacy:     [facility] [address] [place] [address], [place] - [address]  [address]  [facility] [address]  Phone: [phone] Fax: [phone]      Patient Preference of agencies include: none noted    Patient/Caregiver informed of right to choose providers or agencies.  Patient provides permission to release any necessary information to [facility] and to Non-[facility] agencies as needed to facilitate patient care, treatment planning, and patient discharge planning.  Written and verbal resources provided.      Coping: doing well, pt. In  good spirits and states that her family is very supportive.           Adjustment to Diagnosis and Treatment: appropriate      Emotional/Behavioral/Cognitive Issues: none noted            History/Current Symptoms of Anxiety/Depression: No:   History/Current Substance Use:   Social History     Tobacco Use    Smoking status: Former     Current packs/day: 0.00     Average packs/day: 1 pack/day for 47.5 years (47.5 ttl pk-yrs)     Types: Cigarettes     Start date: 1976     Quit date: 2023     Years since quittin.5     Passive exposure: Never    Smokeless tobacco: Never    Tobacco comments:     23 Active participant with smoking cessation. 23 QUIT SMOKING 23.    Substance and Sexual Activity    Alcohol use: No    Drug use: Not Currently     Types: Marijuana    Sexual activity: Yes     Partners: Male       Indications of Abuse/Neglect: No:   Abuse Screen (yes response referral indicated)  Feels Unsafe at Home or Work/School: no  Feels Threatened by Someone: no  Does anyone try to keep you from having contact with others or doing things outside your home?: no  Physical Signs of Abuse Present: no    Financial:  Payer/Plan Subscr  Sex Relation Sub. Ins. ID Effective Group Num   1. [employer] MANAGE* NIKOLAY SIDHU 1960 Female Self [id number]                                   [address] [address]   2. [employer] - S[employer]* NIKOLAY SIDHU 1960 Female Self [id number]                                    PO                             Other identified concerns/needs: none noted    Plan: To return home with help from family    Interventions/Referrals: TBD  Patient/caregiver engaged in treatment planning process.     providing psychosocial and supportive counseling, resources, education, assistance and discharge planning as appropriate.  Patient/caregiver state understanding of  available resources,  following, remains available. Provided pt. With  chasity'er phone # and encouraged her to call if needed. Will follow.

## 2024-01-23 NOTE — NURSING TRANSFER
Nursing Transfer Note      1/22/2024   6:08 PM    Nurse giving handoff:TRISTIN parks   Nurse receiving handoff:TRISTIN Moreira    Reason patient is being transferred: post op    Transfer To: 808    Transfer via stretcher    Transported by transport    Order for Tele Monitor? No    Additional Lines: Miranda Catheter    Medicines sent: LR continuous fluids     Any special needs or follow-up needed: routine    Patient belongings transferred with patient: Yes    Chart send with patient: Yes    Notified: S/O    Patient reassessed at 8817

## 2024-01-23 NOTE — PLAN OF CARE
"Plan of care reviewed with patient this shift. C/O cramping and pain "6/10", rec'd simethicone and oxy 5 mg. Pt is alert and oriented, VSS, afebrile, amb independently. Maintaining saturations on room air. LR infusing at 40 ml/h. Midline dressing CDI with scant dried serosanguinous drainage. Miranda intact with UOP of 1 L. No BM. CPAP nearby. Q2H pt rounding. Bed low and locked with call light in reach. Care continue.   "

## 2024-01-23 NOTE — NURSING
Nurses Note -- 4 Eyes      1/23/2024   12:58 AM      Skin assessed during: Transfer      [x] No Altered Skin Integrity Present    []Prevention Measures Documented      [] Yes- Altered Skin Integrity Present or Discovered   [] LDA Added if Not in Epic (Describe Wound)   [] New Altered Skin Integrity was Present on Admit and Documented in LDA   [] Wound Image Taken    Wound Care Consulted? No    Attending Nurse:  Olivia Roberson RN/Staff Member:  TRISTIN Burns

## 2024-01-23 NOTE — NURSING
Oxy given for headache at 1715. Dressing clean dry and intact with small drainage. Miranda in place till tomorrow when discharging. Urine orange due to Azo. MD aware. Passing gas. Pt ambulates in room and sits in chair most day.

## 2024-01-23 NOTE — PROGRESS NOTES
[facility] ([facility])  Obstetrics & Gynecology  Progress Note    Patient Name: Regina Brown  MRN: [id number]  Admission Date: 1/22/2024  Primary Care Provider: Lesly Pelayo MD  Principal Problem: <principal problem not specified>    Subjective:     Interval History: POD #1 s/p rad hyst/BSO/ureterolysis/SLND for the management of stage 1B1 squamous cell carcinoma of the cervix.     Patient reports feeling well. Pain is well controlled with current regimen, required oxy 5 x 2 overnight. Tolerating PO without N/V. Has ambulated minimally since surgery. Voiding via clinton catheter. +flatus, -BM.     Scheduled Meds:   acetaminophen  1,000 mg Oral Q6H    atorvastatin  80 mg Oral QHS    heparin (porcine)  5,000 Units Subcutaneous Q8H    ketorolac  15 mg Intravenous Q6H    Followed by    ibuprofen  600 mg Oral Q6H    magnesium hydroxide 400 mg/5 ml  30 mL Oral BID    nicotine  1 patch Transdermal Daily    senna  8.6 mg Oral BID     Continuous Infusions:   sodium chloride 0.9% 125 mL/hr at 01/22/24 0530    lactated ringers 40 mL/hr at 01/22/24 1229     PRN Meds:cyclobenzaprine, hydrALAZINE, HYDROmorphone, LORazepam, melatonin, naloxone, ondansetron, oxyCODONE, oxyCODONE, prochlorperazine, simethicone    Review of patient's allergies indicates:   Allergen Reactions    Aspirin      STOMACH BURNING    Lortab [hydrocodone-acetaminophen] Other (See Comments)     Dizziness and nausea    Opioids-meperidine and related Rash       Objective:     Vital Signs (Most Recent):  Temp: 99.2 °F (37.3 °C) (01/23/24 0426)  Pulse: 95 (01/23/24 0426)  Resp: 19 (01/23/24 0426)  BP: 122/62 (01/23/24 0426)  SpO2: (!) 93 % (01/23/24 0426) Vital Signs (24h Range):  Temp:  [97.3 °F (36.3 °C)-100 °F (37.8 °C)] 99.2 °F (37.3 °C)  Pulse:  [66-97] 95  Resp:  [8-37] 19  SpO2:  [92 %-100 %] 93 %  BP: (113-179)/(55-88) 122/62     Weight: 76.9 kg (169 lb 8.5 oz)  Body mass index is 27.36 kg/m².  No LMP recorded. Patient is postmenopausal.    I&O  (Last 24H):    Intake/Output Summary (Last 24 hours) at 1/23/2024 0617  Last data filed at 1/23/2024 0445  Gross per 24 hour   Intake 3289.5 ml   Output 1995 ml   Net 1294.5 ml       Physical Exam:   Constitutional: She is oriented to person, place, and time. She appears well-developed. No distress.    HENT:   Head: Normocephalic and atraumatic.    Eyes: EOM are normal.     Cardiovascular:  Normal rate.             Pulmonary/Chest: Effort normal.        Abdominal: Soft. She exhibits abdominal incision (midline vertical incision with bandage in place without shadowing). There is abdominal tenderness (appropriate postop tenderness). There is no rebound and no guarding.             Musculoskeletal: Normal range of motion.       Neurological: She is alert and oriented to person, place, and time.    Skin: Skin is warm and dry.    Psychiatric: She has a normal mood and affect. Her behavior is normal.       Laboratory:  Recent Results (from the past 24 hour(s))   Basic metabolic panel    Collection Time: 01/23/24  4:03 AM   Result Value Ref Range    Sodium 140 136 - 145 mmol/L    Potassium 4.5 3.5 - 5.1 mmol/L    Chloride 108 95 - 110 mmol/L    CO2 22 (L) 23 - 29 mmol/L    Glucose 98 70 - 110 mg/dL    BUN 9 8 - 23 mg/dL    Creatinine 0.7 0.5 - 1.4 mg/dL    Calcium 9.1 8.7 - 10.5 mg/dL    Anion Gap 10 8 - 16 mmol/L    eGFR >60.0 >60 mL/min/1.73 m^2   CBC auto differential    Collection Time: 01/23/24  4:03 AM   Result Value Ref Range    WBC 9.12 3.90 - 12.70 K/uL    RBC 4.03 4.00 - 5.40 M/uL    Hemoglobin 12.8 12.0 - 16.0 g/dL    Hematocrit 38.3 37.0 - 48.5 %    MCV 95 82 - 98 fL    MCH 31.8 (H) 27.0 - 31.0 pg    MCHC 33.4 32.0 - 36.0 g/dL    RDW 12.2 11.5 - 14.5 %    Platelets 176 150 - 450 K/uL    MPV 11.7 9.2 - 12.9 fL    Immature Granulocytes 0.4 0.0 - 0.5 %    Gran # (ANC) 7.5 1.8 - 7.7 K/uL    Immature Grans (Abs) 0.04 0.00 - 0.04 K/uL    Lymph # 0.8 (L) 1.0 - 4.8 K/uL    Mono # 0.7 0.3 - 1.0 K/uL    Eos # 0.0 0.0 -  0.5 K/uL    Baso # 0.01 0.00 - 0.20 K/uL    nRBC 0 0 /100 WBC    Gran % 82.6 (H) 38.0 - 73.0 %    Lymph % 9.1 (L) 18.0 - 48.0 %    Mono % 7.8 4.0 - 15.0 %    Eosinophil % 0.0 0.0 - 8.0 %    Basophil % 0.1 0.0 - 1.9 %    Differential Method Automated        Diagnostic Results:  N/a    Assessment/Plan:     Postoperative state    - Procedure complications: none  - EBL: 200 mL  - IVF @ 40 cc/h; d/c pt tolerating PO  - Diet: regular  - Pain control: toradol > ibuprofen, tylenol, oxy PRN, dilaudid BTP   - Required oxy 5 x 2 overnight  - In/Out: Clinton in place draining clear urine   - Will leave clinton in place until day of discharge  - Bowel function: +flatus/- BM   - PRN: simethicone, zofran, phenergan, benadryl  - Preop H/h: 14.7/44.5 >  mL > 12.8/38.3  - BMP wnl  - PPX: ambulation encouraged, IS encouraged, TEDs in place, heparin > lovenox > eliquis on discharge     Hyperlipidemia  - Continue home statin     Anxiety  - Continue home medications     History of tobacco use   - Quit 07/2023  - Nicotine patch PRN    Osteoarthritis   - Continue home medications       Suzanne Vázquez MD  Obstetrics & Gynecology  [facility] - [facility] (2nd Fl)

## 2024-01-24 VITALS
OXYGEN SATURATION: 95 % | TEMPERATURE: 97 F | DIASTOLIC BLOOD PRESSURE: 62 MMHG | BODY MASS INDEX: 27.49 KG/M2 | HEIGHT: 66 IN | HEART RATE: 94 BPM | WEIGHT: 171.06 LBS | RESPIRATION RATE: 18 BRPM | SYSTOLIC BLOOD PRESSURE: 135 MMHG

## 2024-01-24 PROBLEM — Z90.710 STATUS POST HYSTERECTOMY: Status: ACTIVE | Noted: 2024-01-24

## 2024-01-24 PROCEDURE — 25000003 PHARM REV CODE 250

## 2024-01-24 PROCEDURE — 25000003 PHARM REV CODE 250: Performed by: STUDENT IN AN ORGANIZED HEALTH CARE EDUCATION/TRAINING PROGRAM

## 2024-01-24 RX ADMIN — IBUPROFEN 600 MG: 600 TABLET, FILM COATED ORAL at 05:01

## 2024-01-24 RX ADMIN — PHENAZOPYRIDINE HYDROCHLORIDE 100 MG: 100 TABLET ORAL at 09:01

## 2024-01-24 RX ADMIN — ACETAMINOPHEN 1000 MG: 500 TABLET ORAL at 12:01

## 2024-01-24 RX ADMIN — ACETAMINOPHEN 1000 MG: 500 TABLET ORAL at 05:01

## 2024-01-24 RX ADMIN — MAGNESIUM HYDROXIDE 2400 MG: 400 SUSPENSION ORAL at 09:01

## 2024-01-24 RX ADMIN — SENNOSIDES 8.6 MG: 8.6 TABLET, FILM COATED ORAL at 09:01

## 2024-01-24 RX ADMIN — IBUPROFEN 600 MG: 600 TABLET, FILM COATED ORAL at 12:01

## 2024-01-24 RX ADMIN — PHENAZOPYRIDINE HYDROCHLORIDE 100 MG: 100 TABLET ORAL at 12:01

## 2024-01-24 NOTE — PROGRESS NOTES
[facility] - [facility] ([facility])  Obstetrics & Gynecology  Progress Note    Patient Name: Regina Brown  MRN: [id number]  Admission Date: 1/22/2024  Primary Care Provider: Lesly Pelayo MD  Principal Problem: <principal problem not specified>    Subjective:     Interval History: POD #2 s/p rad hyst/BSO/ureterolysis/SLND for the management of stage 1B1 squamous cell carcinoma of the cervix.     Patient reports feeling well. Pain is well controlled with current regimen, required oxy 10 x 2 overnight. Tolerating PO without N/V. Has ambulated minimally since surgery. Voiding via clinton catheter. +flatus, +BM.     Scheduled Meds:   acetaminophen  1,000 mg Oral Q6H    atorvastatin  80 mg Oral QHS    enoxparin  40 mg Subcutaneous Q24H (treatment, non-standard time)    ibuprofen  600 mg Oral Q6H    magnesium hydroxide 400 mg/5 ml  30 mL Oral BID    nicotine  1 patch Transdermal Daily    phenazopyridine  100 mg Oral TID WM    senna  8.6 mg Oral BID     Continuous Infusions:      PRN Meds:cyclobenzaprine, hydrALAZINE, HYDROmorphone, LORazepam, melatonin, naloxone, ondansetron, oxyCODONE, oxyCODONE, prochlorperazine, simethicone    Review of patient's allergies indicates:   Allergen Reactions    Aspirin      STOMACH BURNING    Lortab [hydrocodone-acetaminophen] Other (See Comments)     Dizziness and nausea    Opioids-meperidine and related Rash       Objective:     Vital Signs (Most Recent):  Temp: 97.7 °F (36.5 °C) (01/24/24 0333)  Pulse: 79 (01/24/24 0333)  Resp: 18 (01/24/24 0333)  BP: 116/72 (01/24/24 0333)  SpO2: 96 % (01/24/24 0333) Vital Signs (24h Range):  Temp:  [97.7 °F (36.5 °C)-98.7 °F (37.1 °C)] 97.7 °F (36.5 °C)  Pulse:  [70-89] 79  Resp:  [16-18] 18  SpO2:  [93 %-99 %] 96 %  BP: (108-149)/(53-76) 116/72     Weight: 77.6 kg (171 lb 1.2 oz)  Body mass index is 27.61 kg/m².  No LMP recorded. Patient is postmenopausal.    I&O (Last 24H):    Intake/Output Summary (Last 24 hours) at 1/24/2024 0714  Last data filed at  1/24/2024 0544  Gross per 24 hour   Intake 540 ml   Output 3750 ml   Net -3210 ml       Physical Exam:   Constitutional: She is oriented to person, place, and time. She appears well-developed. No distress.    HENT:   Head: Normocephalic and atraumatic.    Eyes: EOM are normal.     Cardiovascular:  Normal rate.             Pulmonary/Chest: Effort normal.        Abdominal: Soft. She exhibits abdominal incision (midline vertical incision with bandage in place without shadowing). There is abdominal tenderness (appropriate postop tenderness). There is no rebound and no guarding.             Musculoskeletal: Normal range of motion.       Neurological: She is alert and oriented to person, place, and time.    Skin: Skin is warm and dry.    Psychiatric: She has a normal mood and affect. Her behavior is normal.       Laboratory:  No results found for this or any previous visit (from the past 24 hour(s)).      Diagnostic Results:  N/a    Assessment/Plan:     Postoperative state    - Procedure complications: none  - EBL: 200 mL  - IVF DC yesterday as patient is tolerating PO  - Diet: regular  - Pain control: toradol > ibuprofen, tylenol, oxy PRN, dilaudid BTP   - Required oxy 10 x 2 overnight  - In/Out: Clinton in place draining clear urine   - Will remove clinton and perform AVT this AM  - Bowel function: +flatus/+BM   - PRN: simethicone, zofran, phenergan, benadryl  - Preop H/h: 14.7/44.5 >  mL > 12.8/38.3  - BMP wnl  - PPX: ambulation encouraged, IS encouraged, TEDs in place, heparin > lovenox > eliquis on discharge     Hyperlipidemia  - Continue home statin     Anxiety  - Continue home medications     History of tobacco use   - Quit 07/2023  - Nicotine patch PRN    Osteoarthritis   - Continue home medications       Mackenzie Reese MD  Obstetrics & Gynecology  [facility] - Surgery ([facility])

## 2024-01-24 NOTE — DISCHARGE SUMMARY
Discharge Summary  Gynecology      Admit Date: 2024    Discharge Date: 2024     Attending Physician: Ana De La Torre MD    Principal Diagnoses:   Status post hysterectomy    Active Hospital Problems    Diagnosis  POA    *S/p rad hyst/BSO/OMX/ureterolysis [Z90.710]  No      Resolved Hospital Problems   No resolved problems to display.       Procedures:   Procedure(s) (LRB):  HYSTERECTOMY, RADICAL, ABDOMINAL (N/A)  SALPINGO-OOPHORECTOMY, BILATERAL (N/A)  MAPPING, LYMPH NODE, SENTINEL (N/A)  BIOPSY, LYMPH NODE (Bilateral)    Discharged Condition: good    Hospital Course:   Regina Brown is a 63 y.o. y.o.  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 Patient tolerated the procedure well and was admitted for post-operative care. Post-operative course was uncomplicated.    On day of discharge (POD#2), patient was in stable condition, having met all post-operative milestones. She was urinating spontaneously, ambulating, and tolerating a regular diet without nausea/vomiting. Pain was well-controlled on oral medication. She was discharged with medications and follow up as listed below.     Consults: None    Significant Diagnostic Studies:  Recent Labs   Lab 24  0403   WBC 9.12   HGB 12.8   HCT 38.3   MCV 95           Treatments:   1. Surgery as above    Disposition: Home or Self Care    Patient Instructions:   Current Discharge Medication List        START taking these medications    Details   apixaban (ELIQUIS) 2.5 mg Tab Take 1 tablet (2.5 mg total) by mouth 2 (two) times daily.  Qty: 56 tablet, Refills: 0      ibuprofen (ADVIL,MOTRIN) 600 MG tablet Take 1 tablet by mouth every 6 hours. Alternate between ibuprofen and tylenol every 3 hours. For example: @0800: ibuprofen 600mg @1100: tylenol 650mg @1400: ibuprofen 600mg @1700: tylenol 650 mg @2000: ibuprofen 600mg  Qty: 30 tablet, Refills: 3       oxyCODONE (ROXICODONE) 5 MG immediate release tablet Take 1 tablet (5 mg total) by mouth every 4 (four) hours as needed for Pain.  Qty: 15 tablet, Refills: 0    Comments: n/a       senna (SENOKOT) 8.6 mg tablet Take 1 tablet by mouth 2 (two) times a day.  Qty: 30 tablet, Refills: 3      simethicone (MYLICON) 80 MG chewable tablet Chew and swallow 1 tablet (80 mg total) by mouth 3 (three) times daily as needed for Flatulence.  Qty: 30 tablet, Refills: 3           CONTINUE these medications which have CHANGED    Details   acetaminophen (TYLENOL) 325 MG tablet Take 2 tablets by mouth every 6 hours. Alternate between ibuprofen & tylenol every 3 hours. For example: @0800: ibuprofen 600mg @1100: tylenol 650mg @1400: ibuprofen 600mg @1700: tylenol 650 mg @2000: ibuprofen 600mg  Qty: 60 tablet, Refills: 3           CONTINUE these medications which have NOT CHANGED    Details   cyclobenzaprine (FLEXERIL) 10 MG tablet Take 1 tablet (10 mg total) by mouth 3 (three) times daily as needed for Muscle spasms.  Qty: 45 tablet, Refills: 2    Associated Diagnoses: Muscle spasms of neck      LORazepam (ATIVAN) 0.5 MG tablet Take 1 tablet (0.5 mg total) by mouth every 8 (eight) hours as needed for Anxiety.  Qty: 30 tablet, Refills: 0    Associated Diagnoses: Malignant neoplasm of overlapping sites of cervix; Anxiety about health      rosuvastatin (CRESTOR) 20 MG tablet Take 1 tablet (20 mg total) by mouth once daily.  Qty: 90 tablet, Refills: 3    Associated Diagnoses: Hypercholesteremia             Discharge Procedure Orders   Comprehensive Metabolic Panel   Standing Status: Future Number of Occurrences: 1 Standing Exp. Date: 02/09/25     CBC Without Differential   Standing Status: Future Number of Occurrences: 1 Standing Exp. Date: 02/09/25     Diet general     Lifting restrictions   Order Comments: No lifting greater than 15 pounds for six weeks.     Other restrictions (specify):   Order Comments: PELVIC REST (IF YOU HAD A  HYSTERECTOMY):  No douching, tampons, or intercourse for 6 weeks.    If prescribed, vaginal estrogen cream may be used during the postoperative period.     DRIVING:  No driving while on narcotics. Driving may be resumed initially with a competent passenger one to two weeks after surgery if no longer taking narcotics.     EXERCISE:  For six weeks your exercise should be limited to walking. You may walk as far as you wish, as long as you increase your level of exertion gradually and avoid slippery surfaces. You may climb stairs as needed to get around, but should not use stair climbing for exercise.     Remove dressing in 24 hours   Order Comments: If you have a bandage on wound, you may remove it the day after dismissal.  If you had steri-strips remove them once they begin to peel off (usually 2 weeks).  If your steri-strips still haven't come off in 2 weeks, please remove them.  Keep incision clean and dry.  Inspect the incision daily for signs and symptoms of infection.     Wound care routine (specify)   Order Comments: WOUND CARE:  If you have a band-aid or bandage on your wound, you may remove it the day after dismissal.  You may wash the wound with mild soap and water.   You may shower at any time but should avoid immersing any abdominal incisions in water for at least two weeks after surgery or until the wound is completely healed. If given, please shower with Hibiclens soap until bottle is completely finished. Keep your wound clean and dry.  You should observe your incision for signs of infection which include redness, warmth, drainage or fever.     Call MD for:  temperature >100.4     Call MD for:  persistent nausea and vomiting     Call MD for:  severe uncontrolled pain     Call MD for:  difficulty breathing, headache or visual disturbances     Call MD for:  redness, tenderness, or signs of infection (pain, swelling, redness, odor or green/yellow discharge around incision site)     Call MD for:  hives      Call MD for:   Order Comments: inability to void,urine is ketchup colored or you have large clots, vaginal bleeding is heavier than a period.    VAGINAL DISCHARGE: You may develop a vaginal discharge and intermittent vaginal spotting after surgery and up to 6 weeks postoperatively.  The discharge may have an odor and may change in color but it is normal.  This is due to dissolving stiches.  Contact your surgical team if you develop vaginal or vulvar irritation along with a discharge.  Also contact your surgical team if you have vaginal discharge that smells like urine or stool.    CONSTIPATION REMEDIES: Patients are often constipated after surgery or with use of oral narcotic medicine. You should continue to take the stool softener, Senokot-S during the next six weeks, and consume adequate amounts of water.  If you have not had a bowel movement for 3 days after dismissal, or are uncomfortable and unable to pass stool, please try one or all of the following measures:  1.  Milk of Magnesia - 30 cc by mouth every 12 hours   2.  Dulcolax suppository - One suppository per rectum every 4-6 hours   3.  Metamucil, Fibercon or other bulk former - use as directed  4.  Fleets Enema      PAIN MEDICATIONS:     Take your pain medications as instructed. It is best to take pain medications before your pain becomes severe. This will allow you to take less medication yet have better pain relief. For the first 2 or 3 days it may be helpful to take your pain medications on a regular schedule (e.g. every 4 to 6 hours). This will help you to keep your pain under better control. You should then begin to take fewer medications each day until you no longer need them. Do not take pain medication on an empty stomach. This may lead to nausea and vomiting.     EKG 12-lead   Standing Status: Future Number of Occurrences: 1 Standing Exp. Date: 12/12/24     Type & Screen   Standing Status: Future Number of Occurrences: 1 Standing Exp. Date:  02/09/25     Activity as tolerated   Order Comments: PELVIC REST:  No douching, tampons, or intercourse for 6 weeks.    If prescribed, vaginal estrogen cream may be used during the postoperative period.     DRIVING:  No driving while on narcotics. Driving may be resumed initially with a competent passenger one to two weeks after surgery if no longer taking narcotics.     EXERCISE:  For six weeks your exercise should be limited to walking. You may walk as far as you wish, as long as you increase your level of exertion gradually and avoid slippery surfaces. You may climb stairs as needed to get around, but should not use stair climbing for exercise.     Shower on day dressing removed (No bath)   Order Comments: You may shower at any time but should avoid immersing any abdominal incisions in water for at least 2 weeks after surgery or until the wound is completely healed.  If given, please shower with Hibaclens soap until bottle is completely finish        Follow-up Information       Ana De La Torre MD Follow up on 2/8/2024.    Specialty: Gynecologic Oncology  Why: Post-op Visit  Contact information:  [address] [address]  [phone]                             Mackenzie Reese MD  OBGYN, PGY-3

## 2024-01-24 NOTE — PLAN OF CARE
POC reviewed with patient. Pt is alert and oriented. VSS. No acute events overnight. Voiding via [place]. PRN oxycodone given x1. Nonskid footwear worn with bed in lowest position and locked. Bed rails up x2. Pt ambulates independently and instructed to call if assistance is needed.  Call light within reach. Pt anticipating discharge today.

## 2024-01-31 ENCOUNTER — TELEPHONE (OUTPATIENT)
Dept: GYNECOLOGIC ONCOLOGY | Facility: CLINIC | Age: 64
End: 2024-01-31
Payer: MEDICARE

## 2024-01-31 NOTE — TELEPHONE ENCOUNTER
----- Message from Lisa Medel sent at 1/31/2024 10:20 AM CST -----  Regarding: pt call back  Name of Who is Calling: Edwige huerta Addashop       What is the request in detail: needs a call back from surgery scheduling in providers office about pt, please advise.  Auth # [id number]      Can the clinic reply by [facility]: no          [facility] [phone] (DIRECT)

## 2024-01-31 NOTE — TELEPHONE ENCOUNTER
----- Message from Lisa Medel sent at 1/31/2024 10:20 AM CST -----  Regarding: pt call back  Name of Who is Calling: Edwige huerta Nimbuzz       What is the request in detail: needs a call back from surgery scheduling in providers office about pt, please advise.  Auth # [id number]      Can the clinic reply by [facility]: no          [facility] [phone] (DIRECT)       DISPLAY PLAN FREE TEXT

## 2024-02-01 LAB
COMMENT: ABNORMAL
FINAL PATHOLOGIC DIAGNOSIS: ABNORMAL
GROSS: ABNORMAL
Lab: ABNORMAL
SUPPLEMENTAL DIAGNOSIS: ABNORMAL

## 2024-02-08 ENCOUNTER — OFFICE VISIT (OUTPATIENT)
Dept: GYNECOLOGIC ONCOLOGY | Facility: CLINIC | Age: 64
End: 2024-02-08
Payer: MEDICARE

## 2024-02-08 VITALS
HEIGHT: 66 IN | TEMPERATURE: 96 F | WEIGHT: 168.63 LBS | OXYGEN SATURATION: 98 % | BODY MASS INDEX: 27.1 KG/M2 | SYSTOLIC BLOOD PRESSURE: 132 MMHG | RESPIRATION RATE: 16 BRPM | HEART RATE: 94 BPM | DIASTOLIC BLOOD PRESSURE: 75 MMHG

## 2024-02-08 DIAGNOSIS — C53.8 MALIGNANT NEOPLASM OF OVERLAPPING SITES OF CERVIX: Primary | ICD-10-CM

## 2024-02-08 PROCEDURE — 99024 POSTOP FOLLOW-UP VISIT: CPT | Mod: S$GLB,,, | Performed by: OBSTETRICS & GYNECOLOGY

## 2024-02-08 PROCEDURE — 1159F MED LIST DOCD IN RCRD: CPT | Mod: CPTII,S$GLB,, | Performed by: OBSTETRICS & GYNECOLOGY

## 2024-02-08 PROCEDURE — 3075F SYST BP GE 130 - 139MM HG: CPT | Mod: CPTII,S$GLB,, | Performed by: OBSTETRICS & GYNECOLOGY

## 2024-02-08 PROCEDURE — 3078F DIAST BP <80 MM HG: CPT | Mod: CPTII,S$GLB,, | Performed by: OBSTETRICS & GYNECOLOGY

## 2024-02-08 PROCEDURE — 99999 PR PBB SHADOW E&M-EST. PATIENT-LVL IV: CPT | Mod: PBBFAC,,, | Performed by: OBSTETRICS & GYNECOLOGY

## 2024-02-08 NOTE — PROGRESS NOTES
Subjective:      Patient ID: Regina Brown is a 64 y.o. female.    Chief Complaint: Follow-up      HPI  PET 2023  - Circumferential hypermetabolism of the cervix consistent with the patient's known squamous cell carcinoma.  There is no significant extension of abnormal hypermetabolism into the surrounding soft tissues and there is no evidence of locoregional lymphadenopathy or distant metastatic disease..    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     Meets Sedlis criteria. Recommend adjuvant EBRT.      Presents today for post operative visit. Recovering appropriately from surgery. Up and about, eating, +BM.         ____________________________  Referred by Dr. Fragoso for newly diagnosed squamous cell carcinoma of the cervix.      Pap smear from 2023 noted atypical squamous cells can not exclude a high-grade intraepithelial lesion. HPV was positive for high-risk type 16. Negative for type 18.      Colposcopic biopsies 10/4/2023  Final Pathologic Diagnosis 1.  Endocervical curettage: Fragments of ectocervix with severe squamous dysplasia (GIA 3/HSIL) admixed with scant benign endocervix   2. Cervix at 5, biopsy:     Severe squamous dysplasia (GIA 3/HSIL)       Underwent LEEP 2023   1. CERVIX, CONIZATION:   - INVASIVE, MODERATE TO POORLY DIFFERENTIATED SQUAMOUS CELL CARCINOMA   - MARGINS POSITIVE FOR CARCINOMA     2. ENDOCERVIX, CURETTAGE:   - FRAGMENTS OF SQUAMOUS CELL CARCINOMA      Prior abdominal surgeries include open appendectomy as a child.  x 3.   Review of Systems   Constitutional:  Negative for appetite change, chills, fatigue and fever.   HENT:  Negative for mouth sores.    Respiratory:  Negative for cough and shortness of breath.    Cardiovascular:  Negative for leg swelling.   Gastrointestinal:   Negative for abdominal pain, blood in stool, constipation and diarrhea.   Endocrine: Negative for cold intolerance.   Genitourinary:  Negative for dysuria and vaginal bleeding.   Musculoskeletal:  Negative for myalgias.   Skin:  Negative for rash.   Allergic/Immunologic: Negative.    Neurological:  Negative for weakness and numbness.   Hematological:  Negative for adenopathy. Does not bruise/bleed easily.   Psychiatric/Behavioral:  Negative for confusion.        Objective:   Physical Exam:   Constitutional: She is oriented to person, place, and time. She appears well-developed and well-nourished.    HENT:   Head: Normocephalic and atraumatic.    Eyes: Pupils are equal, round, and reactive to light. EOM are normal.    Neck: No thyromegaly present.    Cardiovascular:  Normal rate, regular rhythm and intact distal pulses.             Pulmonary/Chest: Effort normal and breath sounds normal. No respiratory distress. She has no wheezes.        Abdominal: Soft. Bowel sounds are normal. She exhibits no distension and no mass. There is no abdominal tenderness.                 Musculoskeletal: Normal range of motion and moves all extremeties.      Lymphadenopathy:     She has no cervical adenopathy.        Right: No supraclavicular adenopathy present.        Left: No supraclavicular adenopathy present.    Neurological: She is alert and oriented to person, place, and time.    Skin: Skin is warm and dry. No rash noted.    Psychiatric: She has a normal mood and affect.       Assessment:     1. Malignant neoplasm of overlapping sites of cervix        Plan:     Orders Placed This Encounter   Procedures    Ambulatory referral/consult to Radiation Oncology     Recovering appropriately from surgery.   Pathology as above. Stage IB2 with Sedlis criteria. Discussed consideration of adjuvant radiation.   RTC 4-6 weeks for follow up post operative visit and continued coordination of care.

## 2024-02-14 ENCOUNTER — SOCIAL WORK (OUTPATIENT)
Dept: HEMATOLOGY/ONCOLOGY | Facility: CLINIC | Age: 64
End: 2024-02-14

## 2024-02-14 ENCOUNTER — OFFICE VISIT (OUTPATIENT)
Dept: RADIATION ONCOLOGY | Facility: CLINIC | Age: 64
End: 2024-02-14
Payer: MEDICARE

## 2024-02-14 VITALS
HEART RATE: 131 BPM | WEIGHT: 167.81 LBS | RESPIRATION RATE: 16 BRPM | DIASTOLIC BLOOD PRESSURE: 70 MMHG | OXYGEN SATURATION: 96 % | SYSTOLIC BLOOD PRESSURE: 152 MMHG | BODY MASS INDEX: 27.08 KG/M2

## 2024-02-14 DIAGNOSIS — C53.8 MALIGNANT NEOPLASM OF OVERLAPPING SITES OF CERVIX: ICD-10-CM

## 2024-02-14 DIAGNOSIS — R45.89 ANXIETY ABOUT HEALTH: ICD-10-CM

## 2024-02-14 PROCEDURE — 3077F SYST BP >= 140 MM HG: CPT | Mod: CPTII,S$GLB,, | Performed by: RADIOLOGY

## 2024-02-14 PROCEDURE — 1111F DSCHRG MED/CURRENT MED MERGE: CPT | Mod: CPTII,S$GLB,, | Performed by: RADIOLOGY

## 2024-02-14 PROCEDURE — 99205 OFFICE O/P NEW HI 60 MIN: CPT | Mod: S$GLB,,, | Performed by: RADIOLOGY

## 2024-02-14 PROCEDURE — 3078F DIAST BP <80 MM HG: CPT | Mod: CPTII,S$GLB,, | Performed by: RADIOLOGY

## 2024-02-14 PROCEDURE — 3008F BODY MASS INDEX DOCD: CPT | Mod: CPTII,S$GLB,, | Performed by: RADIOLOGY

## 2024-02-14 PROCEDURE — 1159F MED LIST DOCD IN RCRD: CPT | Mod: CPTII,S$GLB,, | Performed by: RADIOLOGY

## 2024-02-14 RX ORDER — LORAZEPAM 0.5 MG/1
0.5 TABLET ORAL EVERY 8 HOURS PRN
Qty: 30 TABLET | Refills: 0 | Status: SHIPPED | OUTPATIENT
Start: 2024-02-14

## 2024-02-14 NOTE — PROGRESS NOTES
Regina Brown is a 64 year old diagnosed with cervical cancer.  Patient is here today for a radiation consult with Dr. Ernst.  I met with patient to complete new patient orientation and the NCCN Distress Screening; patient indicated a rating of 10.  Patient denied a referral to mental health supports; she stated that she has a strong support system. Patient denied needing psychosocial support at this time.  I provided patient with the [facility] community events flyer and my contact information in the event supportive services are needed in the future.

## 2024-02-14 NOTE — PROGRESS NOTES
Regina Brown  [id number]  1960 2/14/2024  Ana De La Torre Md  [address] [place] [address]    Dx: Stage IB2 cervical SCC w/ (+)LVSI s/p GILDA/BSO/SLN    HISTORY OF PRESENT ILLNESS:   Ms. Brown is a postmenopausal 64F who p/w abnormal pap smear from 9/20/2023 noting atypical squamous cells can not exclude a high-grade intraepithelial lesion. HPV was positive for high-risk type 16. Negative for type 18.     Colposcopic biopsies 10/4/2023  Final Pathologic Diagnosis 1.  Endocervical curettage: Fragments of ectocervix with severe squamous dysplasia (GIA 3/HSIL) admixed with scant benign endocervix   2. Cervix at 5, biopsy:     Severe squamous dysplasia (GIA 3/HSIL)       Underwent LEEP 11/27/2023   1. CERVIX, CONIZATION:   - INVASIVE, MODERATE TO POORLY DIFFERENTIATED SQUAMOUS CELL CARCINOMA   - MARGINS POSITIVE FOR CARCINOMA     2. ENDOCERVIX, CURETTAGE:   - FRAGMENTS OF SQUAMOUS CELL CARCINOMA     PET 12/8/2023  - Circumferential hypermetabolism of the cervix consistent with the patient's known squamous cell carcinoma.  There is no significant extension of abnormal hypermetabolism into the surrounding soft tissues and there is no evidence of locoregional lymphadenopathy or distant metastatic disease..     GILDA/BSO/SLN 1/22/2024   - Stage IB2 poorly differentiated SCC of the cervix HPV dependent, tumor size 2.3cm, middle third stromal invasion, +LVSI, negative parametria, negative margins, negative sentinel lymph nodes.     She has now been referred to [facility] for eval/discussion re: adj RT as part of her careplan.    REVIEW OF SYSTEMS:  A complete ROS was performed and the patient denies any acute changes/concerns other than per HPI.    Past Medical History:   Diagnosis Date    Abnormal Pap smear of cervix     High cholesterol     HTN (hypertension)     Murmur, heart     Sleep apnea     cpap     Past Surgical History:   Procedure Laterality Date    APPENDECTOMY  1976    BILATERAL  SALPINGO-OOPHORECTOMY (BSO) N/A 2024    Procedure: SALPINGO-OOPHORECTOMY, BILATERAL;  Surgeon: Ana De La Torre MD;  Location: [facility] OR [address] [place] [address];  Service: OB/GYN;  Laterality: N/A;    CONIZATION OF CERVIX USING LOOP ELECTROSURGICAL EXCISION PROCEDURE (LEEP) N/A 2023    Procedure: LEEP CONIZATION, CERVIX AND OTHER INDICATED PROCEDURES, Endocervical Curettage;  Surgeon: Phoenix Fragoso MD;  Location: [facility] OR;  Service: OB/GYN;  Laterality: N/A;    ELBOW SURGERY Right 1998    FINGER MASS EXCISION Left 2023    Procedure: EXCISION, MASS, FINGER;  Surgeon: Migel Richards MD;  Location: [facility] OR;  Service: Orthopedics;  Laterality: Left;    LYMPH NODE BIOPSY Bilateral 2024    Procedure: BIOPSY, LYMPH NODE;  Surgeon: Ana De La Torre MD;  Location: [facility] OR [address] [place] [address];  Service: OB/GYN;  Laterality: Bilateral;    MAPPING, LYMPH NODE, SENTINEL N/A 2024    Procedure: MAPPING, LYMPH NODE, SENTINEL;  Surgeon: Ana De La Torre MD;  Location: [facility] OR [address] [place] [address];  Service: OB/GYN;  Laterality: N/A;    RADICAL ABDOMINAL HYSTERECTOMY N/A 2024    Procedure: HYSTERECTOMY, RADICAL, ABDOMINAL;  Surgeon: Ana De La Torre MD;  Location: [facility] OR [address] [place] [address];  Service: OB/GYN;  Laterality: N/A;  2.5 hr case     Social History     Socioeconomic History    Marital status: Single   Tobacco Use    Smoking status: Former     Current packs/day: 0.00     Average packs/day: 1 pack/day for 47.5 years (47.5 ttl pk-yrs)     Types: Cigarettes     Start date: 1976     Quit date: 2023     Years since quittin.6     Passive exposure: Never    Smokeless tobacco: Never    Tobacco comments:     23 Active participant with smoking cessation. 23 QUIT SMOKING 23.    Substance and Sexual Activity    Alcohol use: No    Drug use: Not Currently     Types: Marijuana    Sexual activity: Yes     Partners: Male     Social Determinants of Health     Financial Resource Strain: Low Risk  (2024)    Overall Financial  Resource Strain (CARDIA)     Difficulty of Paying Living Expenses: Not very hard   Recent Concern: Financial Resource Strain - High Risk (1/5/2024)    Overall Financial Resource Strain (CARDIA)     Difficulty of Paying Living Expenses: Very hard   Food Insecurity: Food Insecurity Present (1/23/2024)    Hunger Vital Sign     Worried About Running Out of Food in the Last Year: Often true     Ran Out of Food in the Last Year: Sometimes true   Transportation Needs: No Transportation Needs (1/23/2024)    PRAPARE - Transportation     Lack of Transportation (Medical): No     Lack of Transportation (Non-Medical): No   Physical Activity: Unknown (1/5/2024)    Exercise Vital Sign     Days of Exercise per Week: 7 days   Stress: No Stress Concern Present (1/23/2024)    [employer] - Occupational Stress Questionnaire     Feeling of Stress : Only a little   Recent Concern: Stress - Stress Concern Present (1/5/2024)    [employer] - Occupational Stress Questionnaire     Feeling of Stress : Rather much   Social Connections: Unknown (1/5/2024)    Social Connection and Isolation Panel [NHANES]     Frequency of Communication with Friends and Family: More than three times a week     Frequency of Social Gatherings with Friends and Family: Once a week     Active Member of Clubs or Organizations: No     Attends Club or Organization Meetings: Never     Marital Status:    Housing Stability: High Risk (1/23/2024)    Housing Stability Vital Sign     Unable to Pay for Housing in the Last Year: Yes     Number of Places Lived in the Last Year: 1     Unstable Housing in the Last Year: No     No family history on file.    PRIOR HISTORY OF CHEMOTHERAPY OR RADIOTHERAPY: Please see HPI for patients prior oncologic history.    Medication List with Changes/Refills   Current Medications    ACETAMINOPHEN (TYLENOL) 325 MG TABLET    Take 2 tablets by mouth every 6 hours. Alternate between  ibuprofen & tylenol every 3 hours. For example: @0800: ibuprofen 600mg @1100: tylenol 650mg @1400: ibuprofen 600mg @1700: tylenol 650 mg @2000: ibuprofen 600mg    APIXABAN (ELIQUIS) 2.5 MG TAB    Take 1 tablet (2.5 mg total) by mouth 2 (two) times daily.    CYCLOBENZAPRINE (FLEXERIL) 10 MG TABLET    Take 1 tablet (10 mg total) by mouth 3 (three) times daily as needed for Muscle spasms.    IBUPROFEN (ADVIL,MOTRIN) 600 MG TABLET    Take 1 tablet by mouth every 6 hours. Alternate between ibuprofen and tylenol every 3 hours. For example: @0800: ibuprofen 600mg @1100: tylenol 650mg @1400: ibuprofen 600mg @1700: tylenol 650 mg @2000: ibuprofen 600mg    LORAZEPAM (ATIVAN) 0.5 MG TABLET    Take 1 tablet (0.5 mg total) by mouth every 8 (eight) hours as needed for Anxiety.    OXYCODONE (ROXICODONE) 5 MG IMMEDIATE RELEASE TABLET    Take 1 tablet (5 mg total) by mouth every 4 (four) hours as needed for Pain.    ROSUVASTATIN (CRESTOR) 20 MG TABLET    Take 1 tablet (20 mg total) by mouth once daily.    SENNA (SENOKOT) 8.6 MG TABLET    Take 1 tablet by mouth 2 (two) times a day.    SIMETHICONE (MYLICON) 80 MG CHEWABLE TABLET    Chew and swallow 1 tablet (80 mg total) by mouth 3 (three) times daily as needed for Flatulence.     Review of patient's allergies indicates:   Allergen Reactions    Aspirin      STOMACH BURNING    Lortab [hydrocodone-acetaminophen] Other (See Comments)     Dizziness and nausea    Opioids-meperidine and related Rash       QUALITY OF LIFE: 90%- Able to Carry on Normal Activity: Minor Symptoms of Disease    There were no vitals filed for this visit.  There is no height or weight on file to calculate BMI.    PHYSICAL EXAM:  GENERAL: alert; in no apparent distress.   HEAD: normocephalic, atraumatic.  EYES: pupils are equal, round, reactive to light and accommodation. Sclera anicteric. Conjunctiva not injected.   NOSE/THROAT: no nasal erythema or rhinorrhea. Oropharynx pink, without erythema, ulcerations or  thrush.   NECK: no cervical motion rigidity; supple with no masses.  CHEST: clear to auscultation bilaterally; no wheezes, crackles or rubs. Patient is speaking comfortably on room air with normal work of breathing without using accessory muscles of respiration.  CARDIOVASCULAR: regular rate and rhythm; no murmurs, rubs or gallops.  ABDOMEN: soft, nontender, nondistended. Bowel sounds present.   MUSCULOSKELETAL: no tenderness to palpation along the spine or scapulae. Normal range of motion.  NEUROLOGIC: cranial nerves II-XII intact bilaterally. Strength 5/5 in bilateral upper and lower extremities. No sensory deficits appreciated. Reflexes globally intact. No cerebellar signs. Normal gait.  LYMPHATIC: no cervical, supraclavicular or axillary adenopathy appreciated bilaterally.   EXTREMITIES: no clubbing, cyanosis, edema.  SKIN: no erythema, rashes, ulcerations noted.   PELVIS: pt refused, however amenable at RTC for simulation day so can check for adequate cuff healing    REVIEW OF IMAGING/PATHOLOGY/LABS: Please see HPI. All images reviewed personally by dictating physician.       ASSESSMENT: Regina Brown is a 64 y.o. female with h/o stage IB2 cervical SCC w/ (+)LVSI s/p GILDA/BSO/SLN    PLAN:  After complete review of the patient's chart/hx and eval/discussion w/ the patient today, I explained that her DOI and LVSI on path indicate that she would benefit from adj pelvic RT, which I will plan to deliver via IMRT w/ full+empty bladder ITV contours to ensure proper target coverage with as minimal OAR toxicity risk as possible.      I spoke w/ Dr. De La Torre regarding margins and cuff status intraoperatively and per path comments, and we agreed that there is no indication for additional brachy cuff boosting nor any chemo.    I spent over one hour in consultation with the patient discussing the rationales, risks, benefits, and alternatives to this plan, as well as the potential toxicities, including but not limited to  fatigue, skin irritation/erythema/desquamation, sterility, weakening of pelvic/hip bones increasing risk of fracture, pelvic pain, diarrhea, proctitis, cystitis, dysuria, bowel or bladder perforation/fistulization requiring surgical repair, vaginal dryness/stenosis requiring dilator, and secondary malignancy.    The patient verbalized understanding of the above plan, risks, benefits, and details, and informed consent was obtained.  We will proceed with RTP-CT imaging with plans to initiate RT w/in the next 1-2 weeks.  Contact info was exchanged, and the patient was encouraged to contact our clinic with any questions, needs, or concerns.    DISPOSITION: RTC FOR CT SIM    I have personally seen and evaluated this patient. Greater than 50% of this time was spent discussing coordination of care and/or counseling.    PHYSICIAN: David Ernst III, MD    Thank you for the opportunity to meet and consult with Regina Brown.   Please feel free to contact me to discuss the above recommendation further.

## 2024-02-14 NOTE — TELEPHONE ENCOUNTER
"Pt contacted navigator with request for refill on Ativan for her nerves. Pt reports just leaving Dr Ernst office and discussing her radiation. She is very anxious and states "my  had one radiation treatment and then . I will need much more than that so I am very anxious about it". Pt confirmed that her prescription from 2023 has no pills remaining and her pharmacy is still the [facility] in [place].   "

## 2024-02-16 NOTE — PATIENT INSTRUCTIONS
Radiation to the female pelvis and skin care instruction sheet given.  Brochures given on Radiation Therapy for Cancer and Radiation Therapy for GYN Cancer.  Patient education video web site address written and provided to the patient.  Patient verbalized understanding.

## 2024-02-22 ENCOUNTER — TREATMENT (OUTPATIENT)
Dept: RADIATION ONCOLOGY | Facility: CLINIC | Age: 64
End: 2024-02-22
Payer: MEDICARE

## 2024-02-22 PROCEDURE — 77399 UNLISTED PX MED RADJ PHYSICS: CPT | Mod: S$GLB,,, | Performed by: RADIOLOGY

## 2024-02-22 PROCEDURE — 77334 RADIATION TREATMENT AID(S): CPT | Mod: S$GLB,,, | Performed by: RADIOLOGY

## 2024-02-22 PROCEDURE — 77263 THER RADIOLOGY TX PLNG CPLX: CPT | Mod: S$GLB,,, | Performed by: RADIOLOGY

## 2024-02-26 PROCEDURE — 77301 RADIOTHERAPY DOSE PLAN IMRT: CPT | Mod: S$GLB,,, | Performed by: RADIOLOGY

## 2024-02-26 PROCEDURE — 77300 RADIATION THERAPY DOSE PLAN: CPT | Mod: S$GLB,,, | Performed by: RADIOLOGY

## 2024-02-26 PROCEDURE — 77338 DESIGN MLC DEVICE FOR IMRT: CPT | Mod: S$GLB,,, | Performed by: RADIOLOGY

## 2024-03-12 ENCOUNTER — PATIENT MESSAGE (OUTPATIENT)
Dept: FAMILY MEDICINE | Facility: CLINIC | Age: 64
End: 2024-03-12
Payer: MEDICARE

## 2024-04-02 ENCOUNTER — TREATMENT (OUTPATIENT)
Dept: RADIATION ONCOLOGY | Facility: CLINIC | Age: 64
End: 2024-04-02
Payer: MEDICARE

## 2024-04-02 PROCEDURE — G6015 RADIATION TX DELIVERY IMRT: HCPCS | Mod: S$GLB,,, | Performed by: RADIOLOGY

## 2024-04-02 PROCEDURE — 77336 RADIATION PHYSICS CONSULT: CPT | Mod: S$GLB,,, | Performed by: RADIOLOGY

## 2024-04-02 PROCEDURE — 77014 PR  CT GUIDANCE PLACEMENT RAD THERAPY FIELDS: CPT | Mod: S$GLB,,, | Performed by: RADIOLOGY

## 2024-04-11 ENCOUNTER — OFFICE VISIT (OUTPATIENT)
Dept: GYNECOLOGIC ONCOLOGY | Facility: CLINIC | Age: 64
End: 2024-04-11
Payer: MEDICARE

## 2024-04-11 VITALS
HEART RATE: 92 BPM | BODY MASS INDEX: 27.1 KG/M2 | DIASTOLIC BLOOD PRESSURE: 80 MMHG | TEMPERATURE: 97 F | SYSTOLIC BLOOD PRESSURE: 115 MMHG | WEIGHT: 168.63 LBS | HEIGHT: 66 IN | RESPIRATION RATE: 16 BRPM | OXYGEN SATURATION: 98 %

## 2024-04-11 DIAGNOSIS — C53.8 MALIGNANT NEOPLASM OF OVERLAPPING SITES OF CERVIX: Primary | ICD-10-CM

## 2024-04-11 PROCEDURE — 3079F DIAST BP 80-89 MM HG: CPT | Mod: CPTII,S$GLB,, | Performed by: OBSTETRICS & GYNECOLOGY

## 2024-04-11 PROCEDURE — 1159F MED LIST DOCD IN RCRD: CPT | Mod: CPTII,S$GLB,, | Performed by: OBSTETRICS & GYNECOLOGY

## 2024-04-11 PROCEDURE — 3074F SYST BP LT 130 MM HG: CPT | Mod: CPTII,S$GLB,, | Performed by: OBSTETRICS & GYNECOLOGY

## 2024-04-11 PROCEDURE — 3008F BODY MASS INDEX DOCD: CPT | Mod: CPTII,S$GLB,, | Performed by: OBSTETRICS & GYNECOLOGY

## 2024-04-11 PROCEDURE — 99214 OFFICE O/P EST MOD 30 MIN: CPT | Mod: 24,S$GLB,, | Performed by: OBSTETRICS & GYNECOLOGY

## 2024-04-11 PROCEDURE — 99999 PR PBB SHADOW E&M-EST. PATIENT-LVL III: CPT | Mod: PBBFAC,,, | Performed by: OBSTETRICS & GYNECOLOGY

## 2024-04-11 NOTE — PROGRESS NOTES
Subjective:      Patient ID: Regina Brown is a 64 y.o. female.    Chief Complaint: No chief complaint on file.      HPI  PET 2023  - Circumferential hypermetabolism of the cervix consistent with the patient's known squamous cell carcinoma.  There is no significant extension of abnormal hypermetabolism into the surrounding soft tissues and there is no evidence of locoregional lymphadenopathy or distant metastatic disease..    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   Meets Sedlis criteria. Recommend adjuvant EBRT.      Today's visit:  Presents today for end of treatment visit.   Completed EBRT 2024       ____________________________  Referred by Dr. Fragoso for newly diagnosed squamous cell carcinoma of the cervix.      Pap smear from 2023 noted atypical squamous cells can not exclude a high-grade intraepithelial lesion. HPV was positive for high-risk type 16. Negative for type 18.      Colposcopic biopsies 10/4/2023  Final Pathologic Diagnosis 1.  Endocervical curettage: Fragments of ectocervix with severe squamous dysplasia (GIA 3/HSIL) admixed with scant benign endocervix   2. Cervix at 5, biopsy:     Severe squamous dysplasia (GIA 3/HSIL)       Underwent LEEP 2023   1. CERVIX, CONIZATION:   - INVASIVE, MODERATE TO POORLY DIFFERENTIATED SQUAMOUS CELL CARCINOMA   - MARGINS POSITIVE FOR CARCINOMA     2. ENDOCERVIX, CURETTAGE:   - FRAGMENTS OF SQUAMOUS CELL CARCINOMA      Prior abdominal surgeries include open appendectomy as a child.  x 3.   Review of Systems   Constitutional:  Negative for appetite change, chills, fatigue and fever.   HENT:  Negative for mouth sores.    Respiratory:  Negative for cough and shortness of breath.    Cardiovascular:  Negative for leg swelling.   Gastrointestinal:  Negative for  abdominal pain, blood in stool, constipation and diarrhea.   Endocrine: Negative for cold intolerance.   Genitourinary:  Negative for dysuria and vaginal bleeding.   Musculoskeletal:  Negative for myalgias.   Skin:  Negative for rash.   Allergic/Immunologic: Negative.    Neurological:  Negative for weakness and numbness.   Hematological:  Negative for adenopathy. Does not bruise/bleed easily.   Psychiatric/Behavioral:  Negative for confusion.        Objective:   Physical Exam:   Constitutional: She is oriented to person, place, and time. She appears well-developed and well-nourished.    HENT:   Head: Normocephalic and atraumatic.    Eyes: Pupils are equal, round, and reactive to light. EOM are normal.    Neck: No thyromegaly present.    Cardiovascular:  Normal rate, regular rhythm and intact distal pulses.             Pulmonary/Chest: Effort normal and breath sounds normal. No respiratory distress. She has no wheezes.        Abdominal: Soft. Bowel sounds are normal. She exhibits no distension and no mass. There is no abdominal tenderness.             Musculoskeletal: Normal range of motion and moves all extremeties.      Lymphadenopathy:     She has no cervical adenopathy.        Right: No supraclavicular adenopathy present.        Left: No supraclavicular adenopathy present.    Neurological: She is alert and oriented to person, place, and time.    Skin: Skin is warm and dry. No rash noted.    Psychiatric: She has a normal mood and affect.       Assessment:     1. Malignant neoplasm of overlapping sites of cervix          Plan:     No orders of the defined types were placed in this encounter.    Completed adjuvant EBRT 4/2/2024.  Feels well.   Discussed surveillance and survivorship.     Per SGO 2017 guidelines, for high risk (advanced stage, treated with primary chemotherapy/radiation therapy or surgery plus adjuvant therapy) follow-up is every 3 months for 2 years, then every 6 months years 3 - 5 after which they  are seen annually by a generalist or gynecologic oncologist.  Cytology can be considered.  There is insufficient evidence for detection of cancer recurrence but may have value in the detection of lower genital tract neoplasia.  There is insufficient data to support routine use of radiographic imaging.    RTC 3 months or sooner if needed.     I spent approximately 30 minutes reviewing the available records and evaluating the patient, out of which over 50% of the time was spent face to face with the patient in counseling and coordinating this patient's care.

## 2024-04-15 ENCOUNTER — OFFICE VISIT (OUTPATIENT)
Dept: FAMILY MEDICINE | Facility: CLINIC | Age: 64
End: 2024-04-15
Payer: MEDICARE

## 2024-04-15 VITALS
WEIGHT: 168 LBS | RESPIRATION RATE: 16 BRPM | OXYGEN SATURATION: 98 % | BODY MASS INDEX: 27 KG/M2 | SYSTOLIC BLOOD PRESSURE: 130 MMHG | HEART RATE: 86 BPM | DIASTOLIC BLOOD PRESSURE: 76 MMHG | HEIGHT: 66 IN

## 2024-04-15 DIAGNOSIS — M62.838 MUSCLE SPASMS OF NECK: ICD-10-CM

## 2024-04-15 DIAGNOSIS — M54.9 CHRONIC BILATERAL BACK PAIN, UNSPECIFIED BACK LOCATION: ICD-10-CM

## 2024-04-15 DIAGNOSIS — Z13.1 DIABETES MELLITUS SCREENING: ICD-10-CM

## 2024-04-15 DIAGNOSIS — E78.00 HYPERCHOLESTEREMIA: ICD-10-CM

## 2024-04-15 DIAGNOSIS — Z12.11 COLON CANCER SCREENING: Primary | ICD-10-CM

## 2024-04-15 DIAGNOSIS — Z13.29 THYROID DISORDER SCREEN: ICD-10-CM

## 2024-04-15 DIAGNOSIS — R79.9 ABNORMAL FINDING OF BLOOD CHEMISTRY, UNSPECIFIED: ICD-10-CM

## 2024-04-15 DIAGNOSIS — Z79.899 OTHER LONG TERM (CURRENT) DRUG THERAPY: ICD-10-CM

## 2024-04-15 DIAGNOSIS — Z00.00 ROUTINE MEDICAL EXAM: ICD-10-CM

## 2024-04-15 DIAGNOSIS — G89.29 CHRONIC BILATERAL BACK PAIN, UNSPECIFIED BACK LOCATION: ICD-10-CM

## 2024-04-15 DIAGNOSIS — R09.89 BILATERAL CAROTID BRUITS: ICD-10-CM

## 2024-04-15 DIAGNOSIS — R09.89 BRUIT OF RIGHT CAROTID ARTERY: ICD-10-CM

## 2024-04-15 DIAGNOSIS — Z13.220 LIPID SCREENING: ICD-10-CM

## 2024-04-15 DIAGNOSIS — F41.9 ANXIETY DISORDER, UNSPECIFIED: Chronic | ICD-10-CM

## 2024-04-15 PROCEDURE — 3075F SYST BP GE 130 - 139MM HG: CPT | Mod: CPTII,S$GLB,, | Performed by: FAMILY MEDICINE

## 2024-04-15 PROCEDURE — 3078F DIAST BP <80 MM HG: CPT | Mod: CPTII,S$GLB,, | Performed by: FAMILY MEDICINE

## 2024-04-15 PROCEDURE — 3008F BODY MASS INDEX DOCD: CPT | Mod: CPTII,S$GLB,, | Performed by: FAMILY MEDICINE

## 2024-04-15 PROCEDURE — 99214 OFFICE O/P EST MOD 30 MIN: CPT | Mod: S$GLB,,, | Performed by: FAMILY MEDICINE

## 2024-04-15 PROCEDURE — 99999 PR PBB SHADOW E&M-EST. PATIENT-LVL III: CPT | Mod: PBBFAC,,, | Performed by: FAMILY MEDICINE

## 2024-04-15 PROCEDURE — 1159F MED LIST DOCD IN RCRD: CPT | Mod: CPTII,S$GLB,, | Performed by: FAMILY MEDICINE

## 2024-04-15 RX ORDER — ROSUVASTATIN CALCIUM 20 MG/1
20 TABLET, COATED ORAL NIGHTLY
Qty: 90 TABLET | Refills: 3 | Status: CANCELLED | OUTPATIENT
Start: 2024-04-15 | End: 2025-04-15

## 2024-04-15 RX ORDER — CYCLOBENZAPRINE HCL 10 MG
10 TABLET ORAL 3 TIMES DAILY PRN
Qty: 45 TABLET | Refills: 2 | Status: CANCELLED | OUTPATIENT
Start: 2024-04-15

## 2024-04-15 RX ORDER — CYCLOBENZAPRINE HCL 10 MG
10 TABLET ORAL NIGHTLY
Qty: 90 TABLET | Refills: 3 | Status: SHIPPED | OUTPATIENT
Start: 2024-04-15

## 2024-04-15 RX ORDER — ROSUVASTATIN CALCIUM 20 MG/1
20 TABLET, COATED ORAL NIGHTLY
Qty: 90 TABLET | Refills: 3 | Status: SHIPPED | OUTPATIENT
Start: 2024-04-15 | End: 2025-04-15

## 2024-04-15 NOTE — PROGRESS NOTES
Subjective:       Patient ID: Regina Brown is a 64 y.o. female.    Chief Complaint: Follow-up (6 month) and Hyperlipidemia      Past Medical History:   Diagnosis Date    Abnormal Pap smear of cervix     Cervical cancer     High cholesterol     HTN (hypertension)     Murmur, heart     Sleep apnea     cpap       Past Surgical History:   Procedure Laterality Date    APPENDECTOMY      BILATERAL SALPINGO-OOPHORECTOMY (BSO) N/A 2024    Procedure: SALPINGO-OOPHORECTOMY, BILATERAL;  Surgeon: Ana De La Torre MD;  Location: [facility] OR [address] [place] [address];  Service: OB/GYN;  Laterality: N/A;    CONIZATION OF CERVIX USING LOOP ELECTROSURGICAL EXCISION PROCEDURE (LEEP) N/A 2023    Procedure: LEEP CONIZATION, CERVIX AND OTHER INDICATED PROCEDURES, Endocervical Curettage;  Surgeon: Phoenix Fragoso MD;  Location: [facility] OR;  Service: OB/GYN;  Laterality: N/A;    ELBOW SURGERY Right     FINGER MASS EXCISION Left 2023    Procedure: EXCISION, MASS, FINGER;  Surgeon: Migel Richards MD;  Location: [facility] OR;  Service: Orthopedics;  Laterality: Left;    LYMPH NODE BIOPSY Bilateral 2024    Procedure: BIOPSY, LYMPH NODE;  Surgeon: Ana De La Torre MD;  Location: [facility] OR [address] [place] [address];  Service: OB/GYN;  Laterality: Bilateral;    MAPPING, LYMPH NODE, SENTINEL N/A 2024    Procedure: MAPPING, LYMPH NODE, SENTINEL;  Surgeon: Ana De La Torre MD;  Location: [facility] [address] [place] [address];  Service: OB/GYN;  Laterality: N/A;    RADICAL ABDOMINAL HYSTERECTOMY N/A 2024    Procedure: HYSTERECTOMY, RADICAL, ABDOMINAL;  Surgeon: Ana De La Torre MD;  Location: [facility] [address] [place] [address];  Service: OB/GYN;  Laterality: N/A;  2.5 hr case        Social History     Socioeconomic History    Marital status: Single   Tobacco Use    Smoking status: Former     Current packs/day: 0.00     Average packs/day: 1 pack/day for 47.5 years (47.5 ttl pk-yrs)     Types: Cigarettes     Start date: 1976     Quit date: 2023     Years since quittin.7      Passive exposure: Never    Smokeless tobacco: Never    Tobacco comments:     6/28/23 Active participant with smoking cessation. 7/20/23 QUIT SMOKING 7/1/23.    Substance and Sexual Activity    Alcohol use: No    Drug use: Yes     Types: Marijuana, Benzodiazepines     Comment: as prescribed    Sexual activity: Yes     Partners: Male     Birth control/protection: See Surgical Hx     Social Determinants of Health     Financial Resource Strain: Low Risk  (1/23/2024)    Overall Financial Resource Strain (CARDIA)     Difficulty of Paying Living Expenses: Not very hard   Recent Concern: Financial Resource Strain - High Risk (1/5/2024)    Overall Financial Resource Strain (CARDIA)     Difficulty of Paying Living Expenses: Very hard   Food Insecurity: Food Insecurity Present (1/23/2024)    Hunger Vital Sign     Worried About Running Out of Food in the Last Year: Often true     Ran Out of Food in the Last Year: Sometimes true   Transportation Needs: No Transportation Needs (1/23/2024)    PRAPARE - Transportation     Lack of Transportation (Medical): No     Lack of Transportation (Non-Medical): No   Physical Activity: Unknown (1/5/2024)    Exercise Vital Sign     Days of Exercise per Week: 7 days   Stress: No Stress Concern Present (1/23/2024)    [employer] - Occupational Stress Questionnaire     Feeling of Stress : Only a little   Recent Concern: Stress - Stress Concern Present (1/5/2024)    [employer] - Occupational Stress Questionnaire     Feeling of Stress : Rather much   Social Connections: Unknown (1/5/2024)    Social Connection and Isolation Panel [NHANES]     Frequency of Communication with Friends and Family: More than three times a week     Frequency of Social Gatherings with Friends and Family: Once a week     Active Member of Clubs or Organizations: No     Attends Club or Organization Meetings: Never     Marital Status:    Housing Stability: High  Risk (1/23/2024)    Housing Stability Vital Sign     Unable to Pay for Housing in the Last Year: Yes     Number of Places Lived in the Last Year: 1     Unstable Housing in the Last Year: No       No family history on file.    Review of patient's allergies indicates:   Allergen Reactions    Aspirin      STOMACH BURNING    Lortab [hydrocodone-acetaminophen] Other (See Comments)     Dizziness and nausea    Opioids-meperidine and related Rash          Current Outpatient Medications:     acetaminophen (TYLENOL) 325 MG tablet, Take 2 tablets by mouth every 6 hours. Alternate between ibuprofen & tylenol every 3 hours. For example: @0800: ibuprofen 600mg @1100: tylenol 650mg @1400: ibuprofen 600mg @1700: tylenol 650 mg @2000: ibuprofen 600mg, Disp: 60 tablet, Rfl: 3    LORazepam (ATIVAN) 0.5 MG tablet, TAKE 1 TABLET BY MOUTH EVERY 8 HOURS AS NEEDED FOR ANXIETY, Disp: 30 tablet, Rfl: 0    cyclobenzaprine (FLEXERIL) 10 MG tablet, Take 1 tablet (10 mg total) by mouth every evening., Disp: 90 tablet, Rfl: 3    rosuvastatin (CRESTOR) 20 MG tablet, Take 1 tablet (20 mg total) by mouth every evening., Disp: 90 tablet, Rfl: 3    Ms Brown is a 63 yo female her for a routine medical exam. She had a radical hysterectomy in 11/2023 and had follow up radiation, she has now been declared cancer free. Surgical procedure done by Dr De La Torre. She follows up wioth Dr De La Torre q 3 months for 3 more years    She had labs done 3-5 months ago and does not need labs today. We will do labs at next visit    Follow-up  Pertinent negatives include no abdominal pain, chest pain, chills, congestion, coughing, diaphoresis, fever, nausea, rash or sore throat.   Hyperlipidemia  Pertinent negatives include no chest pain or shortness of breath.     Review of Systems   Constitutional:  Negative for activity change, appetite change, chills, diaphoresis and fever.   HENT:  Negative for congestion, ear pain, postnasal drip, rhinorrhea and sore throat.    Eyes:   Negative for pain, discharge, redness and itching.   Respiratory:  Negative for cough and shortness of breath.    Cardiovascular:  Negative for chest pain, palpitations and leg swelling.   Gastrointestinal:  Negative for abdominal distention, abdominal pain, constipation, diarrhea and nausea.   Genitourinary:  Negative for difficulty urinating, dysuria, frequency and urgency.   Skin:  Negative for color change, rash and wound.   All other systems reviewed and are negative.      Objective:      Physical Exam  Constitutional:       Appearance: Normal appearance.   HENT:      Head: Normocephalic.   Eyes:      Extraocular Movements: Extraocular movements intact.      Pupils: Pupils are equal, round, and reactive to light.   Neck:      Vascular: Carotid bruit present.   Cardiovascular:      Rate and Rhythm: Normal rate and regular rhythm.      Pulses: Normal pulses.      Heart sounds: Normal heart sounds.   Pulmonary:      Effort: Pulmonary effort is normal. No respiratory distress.      Breath sounds: Normal breath sounds. No wheezing or rhonchi.   Skin:     General: Skin is warm and dry.   Neurological:      General: No focal deficit present.      Mental Status: She is alert and oriented to person, place, and time. Mental status is at baseline.   Psychiatric:         Mood and Affect: Mood normal.         Behavior: Behavior normal.         Assessment:       1. Colon cancer screening    2. Hypercholesteremia    3. Bruit of right carotid artery    4. Lipid screening    5. Diabetes mellitus screening    6. Thyroid disorder screen    7. Routine medical exam    8. Other long term (current) drug therapy    9. Abnormal finding of blood chemistry, unspecified    10. Anxiety disorder, unspecified    11. Chronic bilateral back pain, unspecified back location    12. Muscle spasms of neck    13. Bilateral carotid bruits        Plan:         Colon cancer screening  -     Fecal Immunochemical Test (iFOBT); Future; Expected date:  04/15/2024    Hypercholesteremia  -     Lipid Panel; Future; Expected date: 10/15/2024    Bruit of right carotid artery  -     Lipid Panel; Future; Expected date: 10/15/2024    Lipid screening  -     Lipid Panel; Future; Expected date: 10/15/2024    Diabetes mellitus screening  -     Hemoglobin A1C; Future; Expected date: 10/15/2024    Thyroid disorder screen  -     TSH; Future; Expected date: 10/15/2024    Routine medical exam  -     Microalbumin/Creatinine Ratio, Urine; Future; Expected date: 10/15/2024  -     Vitamin D; Future; Expected date: 10/15/2024  -     Vitamin B12; Future; Expected date: 10/15/2024  -     CBC Auto Differential; Future; Expected date: 10/15/2024  -     Comprehensive Metabolic Panel; Future; Expected date: 10/15/2024  -     Hemoglobin A1C; Future; Expected date: 10/15/2024  -     TSH; Future; Expected date: 10/15/2024    Other long term (current) drug therapy  -     Vitamin D; Future; Expected date: 10/15/2024  -     Vitamin B12; Future; Expected date: 10/15/2024    Abnormal finding of blood chemistry, unspecified  -     CBC Auto Differential; Future; Expected date: 10/15/2024  -     Hemoglobin A1C; Future; Expected date: 10/15/2024    Anxiety disorder, unspecified  -     TSH; Future; Expected date: 10/15/2024    Chronic bilateral back pain, unspecified back location  -     rosuvastatin (CRESTOR) 20 MG tablet; Take 1 tablet (20 mg total) by mouth every evening.  Dispense: 90 tablet; Refill: 3    Muscle spasms of neck  -     cyclobenzaprine (FLEXERIL) 10 MG tablet; Take 1 tablet (10 mg total) by mouth every evening.  Dispense: 90 tablet; Refill: 3    Bilateral carotid bruits  -     US Carotid Bilateral; Future; Expected date: 04/15/2024    Follow up in 6 months or sooner if needed        Risks, benefits, and side effects were discussed with the patient. All questions were answered to the fullest satisfaction of the patient, and pt verbalized understanding and agreement to treatment plan. Pt  was to call with any new or worsening symptoms, or present to the ER.        Lesly Pelayo MD

## 2024-04-23 ENCOUNTER — CLINICAL SUPPORT (OUTPATIENT)
Dept: RADIATION ONCOLOGY | Facility: CLINIC | Age: 64
End: 2024-04-23
Payer: MEDICARE

## 2024-04-23 DIAGNOSIS — C53.8 MALIGNANT NEOPLASM OF OVERLAPPING SITES OF CERVIX: Primary | ICD-10-CM

## 2024-04-23 NOTE — PROGRESS NOTES
DIAGNOSIS: Stage IB2 cervical SCC w/ (+)LVSI s/p GILDA/BSO/SLN    TREATMENT      Contacted patient today for 3 week checkup. Pt reports resolution of LUTS/spasms, and denies any other issues or tx toxicity.    A/P  1.  CT c/a/p in 2 months  2.  Follow-up with Dr. De La Torre as directed; recent appt showed ROSALINE  3.  Return to clinic soon to obtain dilator and for f/u in 3m

## 2024-05-02 ENCOUNTER — HOSPITAL ENCOUNTER (OUTPATIENT)
Dept: RADIOLOGY | Facility: HOSPITAL | Age: 64
Discharge: HOME | End: 2024-05-02
Attending: FAMILY MEDICINE
Payer: MEDICARE

## 2024-05-02 DIAGNOSIS — R09.89 BILATERAL CAROTID BRUITS: ICD-10-CM

## 2024-05-02 PROCEDURE — 93880 EXTRACRANIAL BILAT STUDY: CPT | Mod: 26,,, | Performed by: RADIOLOGY

## 2024-05-02 PROCEDURE — 93880 EXTRACRANIAL BILAT STUDY: CPT | Mod: TC

## 2024-05-07 DIAGNOSIS — R09.89 BILATERAL CAROTID BRUITS: Primary | ICD-10-CM

## 2024-06-13 ENCOUNTER — TELEPHONE (OUTPATIENT)
Dept: SMOKING CESSATION | Facility: CLINIC | Age: 64
End: 2024-06-13
Payer: MEDICARE

## 2024-06-13 NOTE — TELEPHONE ENCOUNTER
Called patient about 12 month follow up. Left message for patient to call [phone]. Resolved quit episode.

## 2024-06-24 ENCOUNTER — HOSPITAL ENCOUNTER (OUTPATIENT)
Dept: RADIOLOGY | Facility: HOSPITAL | Age: 64
Discharge: HOME | End: 2024-06-24
Attending: RADIOLOGY
Payer: MEDICARE

## 2024-06-24 DIAGNOSIS — C53.8 MALIGNANT NEOPLASM OF OVERLAPPING SITES OF CERVIX: ICD-10-CM

## 2024-06-24 LAB
CREAT SERPL-MCNC: 0.7 MG/DL (ref 0.5–1.4)
SAMPLE: NORMAL

## 2024-06-24 PROCEDURE — 71260 CT THORAX DX C+: CPT | Mod: 26,,, | Performed by: RADIOLOGY

## 2024-06-24 PROCEDURE — 74177 CT ABD & PELVIS W/CONTRAST: CPT | Mod: 26,,, | Performed by: RADIOLOGY

## 2024-06-24 PROCEDURE — 74177 CT ABD & PELVIS W/CONTRAST: CPT | Mod: TC

## 2024-06-24 PROCEDURE — 25500020 PHARM REV CODE 255

## 2024-06-24 RX ADMIN — IOHEXOL 75 ML: 350 INJECTION, SOLUTION INTRAVENOUS at 09:06

## 2024-07-10 ENCOUNTER — OFFICE VISIT (OUTPATIENT)
Dept: CARDIOLOGY | Facility: CLINIC | Age: 64
End: 2024-07-10
Payer: MEDICARE

## 2024-07-10 VITALS
OXYGEN SATURATION: 97 % | SYSTOLIC BLOOD PRESSURE: 116 MMHG | BODY MASS INDEX: 26.84 KG/M2 | DIASTOLIC BLOOD PRESSURE: 78 MMHG | HEIGHT: 66 IN | WEIGHT: 167 LBS | HEART RATE: 108 BPM | RESPIRATION RATE: 16 BRPM

## 2024-07-10 DIAGNOSIS — Z53.20 PROCEDURE NOT CARRIED OUT BECAUSE OF PATIENT'S DECISION: Primary | ICD-10-CM

## 2024-07-10 DIAGNOSIS — R09.89 BILATERAL CAROTID BRUITS: ICD-10-CM

## 2024-07-10 PROCEDURE — 99499 UNLISTED E&M SERVICE: CPT | Mod: S$GLB,,, | Performed by: STUDENT IN AN ORGANIZED HEALTH CARE EDUCATION/TRAINING PROGRAM

## 2024-07-10 NOTE — PROGRESS NOTES
Subjective:      Patient ID: Regina Brown is a 64 y.o. female.    Chief Complaint: Follow-up (3 Month follow)    HPI  PET 2023  - Circumferential hypermetabolism of the cervix consistent with the patient's known squamous cell carcinoma.  There is no significant extension of abnormal hypermetabolism into the surrounding soft tissues and there is no evidence of locoregional lymphadenopathy or distant metastatic disease..    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   Meets Sedlis criteria. Recommend adjuvant EBRT.      Completed EBRT 2024    Today's visit:  Presents today for surveillance visit. Doing well and without complaints. Specifically denies changes In her bowel or bladder habits, N/V, pain, or vaginal bleeding.    CT CAP 2024 obtained by Dr. Ernst shows no obvious evidence of disease.     Disease free interval 3 months.      ____________________________  Referred by Dr. Fragoso for newly diagnosed squamous cell carcinoma of the cervix.      Pap smear from 2023 noted atypical squamous cells can not exclude a high-grade intraepithelial lesion. HPV was positive for high-risk type 16. Negative for type 18.      Colposcopic biopsies 10/4/2023  Final Pathologic Diagnosis 1.  Endocervical curettage: Fragments of ectocervix with severe squamous dysplasia (GIA 3/HSIL) admixed with scant benign endocervix   2. Cervix at 5, biopsy:     Severe squamous dysplasia (GIA 3/HSIL)       Underwent LEEP 2023   1. CERVIX, CONIZATION:   - INVASIVE, MODERATE TO POORLY DIFFERENTIATED SQUAMOUS CELL CARCINOMA   - MARGINS POSITIVE FOR CARCINOMA     2. ENDOCERVIX, CURETTAGE:   - FRAGMENTS OF SQUAMOUS CELL CARCINOMA      Prior abdominal surgeries include open appendectomy as a child.  x 3.   Review of Systems    Constitutional:  Negative for appetite change, chills, fatigue and fever.   HENT:  Negative for mouth sores.    Respiratory:  Negative for cough and shortness of breath.    Cardiovascular:  Negative for leg swelling.   Gastrointestinal:  Negative for abdominal pain, blood in stool, constipation and diarrhea.   Endocrine: Negative for cold intolerance.   Genitourinary:  Negative for dysuria and vaginal bleeding.   Musculoskeletal:  Negative for myalgias.   Skin:  Negative for rash.   Allergic/Immunologic: Negative.    Neurological:  Negative for weakness and numbness.   Hematological:  Negative for adenopathy. Does not bruise/bleed easily.   Psychiatric/Behavioral:  Negative for confusion.        Objective:   Physical Exam:   Constitutional: She is oriented to person, place, and time. She appears well-developed and well-nourished.    HENT:   Head: Normocephalic and atraumatic.    Eyes: Pupils are equal, round, and reactive to light. EOM are normal.    Neck: No thyromegaly present.    Cardiovascular:  Normal rate, regular rhythm and intact distal pulses.             Pulmonary/Chest: Effort normal and breath sounds normal. No respiratory distress. She has no wheezes.        Abdominal: Soft. Bowel sounds are normal. She exhibits no distension and no mass. There is no abdominal tenderness.     Genitourinary:    Vagina normal.      Pelvic exam was performed with patient supine.   There is no lesion on the right labia. There is no lesion on the left labia. Right adnexum displays no mass. Left adnexum displays no mass. Vagina exhibits no lesion. No bleeding in the vagina. Cervix is absent.Uterus is absent.           Musculoskeletal: Normal range of motion and moves all extremeties.      Lymphadenopathy:     She has no cervical adenopathy. No inguinal adenopathy noted on the right or left side.        Right: No supraclavicular adenopathy present.        Left: No supraclavicular adenopathy present.    Neurological: She is  alert and oriented to person, place, and time.    Skin: Skin is warm and dry. No rash noted.    Psychiatric: She has a normal mood and affect.       Assessment:     1. Malignant neoplasm of overlapping sites of cervix    2. Encounter for follow-up surveillance of cervical cancer        Plan:     No orders of the defined types were placed in this encounter.    No evidence of disease on today's exam  Feels well, no new symptoms  Disease free interval 3 months.     RTC 3 months or sooner if needed.     I spent approximately 30 minutes reviewing the available records and evaluating the patient, out of which over 50% of the time was spent face to face with the patient in counseling and coordinating this patient's care.

## 2024-07-11 ENCOUNTER — OFFICE VISIT (OUTPATIENT)
Dept: GYNECOLOGIC ONCOLOGY | Facility: CLINIC | Age: 64
End: 2024-07-11
Payer: MEDICARE

## 2024-07-11 VITALS
HEIGHT: 66 IN | SYSTOLIC BLOOD PRESSURE: 120 MMHG | RESPIRATION RATE: 17 BRPM | OXYGEN SATURATION: 98 % | WEIGHT: 166.25 LBS | BODY MASS INDEX: 26.72 KG/M2 | TEMPERATURE: 96 F | HEART RATE: 86 BPM | DIASTOLIC BLOOD PRESSURE: 60 MMHG

## 2024-07-11 DIAGNOSIS — C53.8 MALIGNANT NEOPLASM OF OVERLAPPING SITES OF CERVIX: Primary | ICD-10-CM

## 2024-07-11 DIAGNOSIS — Z08 ENCOUNTER FOR FOLLOW-UP SURVEILLANCE OF CERVICAL CANCER: ICD-10-CM

## 2024-07-11 DIAGNOSIS — Z85.41 ENCOUNTER FOR FOLLOW-UP SURVEILLANCE OF CERVICAL CANCER: ICD-10-CM

## 2024-07-11 PROCEDURE — 3008F BODY MASS INDEX DOCD: CPT | Mod: CPTII,S$GLB,, | Performed by: OBSTETRICS & GYNECOLOGY

## 2024-07-11 PROCEDURE — 1159F MED LIST DOCD IN RCRD: CPT | Mod: CPTII,S$GLB,, | Performed by: OBSTETRICS & GYNECOLOGY

## 2024-07-11 PROCEDURE — 99999 PR PBB SHADOW E&M-EST. PATIENT-LVL III: CPT | Mod: PBBFAC,,, | Performed by: OBSTETRICS & GYNECOLOGY

## 2024-07-11 PROCEDURE — 3078F DIAST BP <80 MM HG: CPT | Mod: CPTII,S$GLB,, | Performed by: OBSTETRICS & GYNECOLOGY

## 2024-07-11 PROCEDURE — 3074F SYST BP LT 130 MM HG: CPT | Mod: CPTII,S$GLB,, | Performed by: OBSTETRICS & GYNECOLOGY

## 2024-07-11 PROCEDURE — 99214 OFFICE O/P EST MOD 30 MIN: CPT | Mod: S$GLB,,, | Performed by: OBSTETRICS & GYNECOLOGY

## 2024-07-29 ENCOUNTER — TELEPHONE (OUTPATIENT)
Dept: RADIATION ONCOLOGY | Facility: CLINIC | Age: 64
End: 2024-07-29

## 2024-08-02 ENCOUNTER — OFFICE VISIT (OUTPATIENT)
Dept: RADIATION ONCOLOGY | Facility: CLINIC | Age: 64
End: 2024-08-02
Payer: MEDICARE

## 2024-08-02 VITALS
WEIGHT: 167.38 LBS | BODY MASS INDEX: 27.02 KG/M2 | RESPIRATION RATE: 16 BRPM | HEART RATE: 75 BPM | OXYGEN SATURATION: 94 % | DIASTOLIC BLOOD PRESSURE: 69 MMHG | SYSTOLIC BLOOD PRESSURE: 112 MMHG

## 2024-08-02 DIAGNOSIS — C53.8 MALIGNANT NEOPLASM OF OVERLAPPING SITES OF CERVIX: Primary | ICD-10-CM

## 2024-08-02 NOTE — PROGRESS NOTES
Regina Brown  [id number]  1960 8/2/2024  No referring provider defined for this encounter.    DIAGNOSIS: Stage IB2 cervical SCC w/ (+)LVSI s/p GILDA/BSO/SLN  REASON FOR VISIT: Routine scheduled follow-up.    HISTORY OF PRESENT ILLNESS:   Regina Brown is a postmenopausal 64F who p/w abnormal pap smear from 9/20/2023 noting atypical squamous cells can not exclude a high-grade intraepithelial lesion. HPV was positive for high-risk type 16. Negative for type 18.     Colposcopic biopsies 10/4/2023  Final Pathologic Diagnosis 1.  Endocervical curettage: Fragments of ectocervix with severe squamous dysplasia (GIA 3/HSIL) admixed with scant benign endocervix   2. Cervix at 5, biopsy:     Severe squamous dysplasia (GIA 3/HSIL)       Underwent LEEP 11/27/2023   1. CERVIX, CONIZATION:   - INVASIVE, MODERATE TO POORLY DIFFERENTIATED SQUAMOUS CELL CARCINOMA   - MARGINS POSITIVE FOR CARCINOMA     2. ENDOCERVIX, CURETTAGE:   - FRAGMENTS OF SQUAMOUS CELL CARCINOMA     PET 12/8/2023  - Circumferential hypermetabolism of the cervix consistent with the patient's known squamous cell carcinoma.  There is no significant extension of abnormal hypermetabolism into the surrounding soft tissues and there is no evidence of locoregional lymphadenopathy or distant metastatic disease..     GILDA/BSO/SLN 1/22/2024   - Stage IB2 poorly differentiated SCC of the cervix HPV dependent, tumor size 2.3cm, middle third stromal invasion, +LVSI, negative parametria, negative margins, negative sentinel lymph nodes.     She has now been referred to [facility] for eval/discussion re: adj RT as part of her careplan.        INTERVAL HISTORY:   Denies fever, chills, chest pain, shortness of breath, cough.  Denies nausea, vomiting or abdominal pain.  Denies vaginal discharge, dysuria or hematuria.  Frequency of urination has recovered to baseline.  Denies diarrhea or bright red blood per rectum.  Remains sexually active.    7/10/24 Dr De La Torre: ROSALINE    Review of  systems otherwise negative unless indicated in HPI/interval history.    Past Medical History:   Diagnosis Date    Abnormal Pap smear of cervix     Cervical cancer     High cholesterol     HTN (hypertension)     Murmur, heart     Sleep apnea     cpap     Past Surgical History:   Procedure Laterality Date    APPENDECTOMY      BILATERAL SALPINGO-OOPHORECTOMY (BSO) N/A 2024    Procedure: SALPINGO-OOPHORECTOMY, BILATERAL;  Surgeon: Ana De La Torre MD;  Location: [facility] OR [address] [place] [address];  Service: OB/GYN;  Laterality: N/A;    CONIZATION OF CERVIX USING LOOP ELECTROSURGICAL EXCISION PROCEDURE (LEEP) N/A 2023    Procedure: LEEP CONIZATION, CERVIX AND OTHER INDICATED PROCEDURES, Endocervical Curettage;  Surgeon: Phoenix Fragoso MD;  Location: [facility] OR;  Service: OB/GYN;  Laterality: N/A;    ELBOW SURGERY Right     FINGER MASS EXCISION Left 2023    Procedure: EXCISION, MASS, FINGER;  Surgeon: Migel Richards MD;  Location: [facility] OR;  Service: Orthopedics;  Laterality: Left;    LYMPH NODE BIOPSY Bilateral 2024    Procedure: BIOPSY, LYMPH NODE;  Surgeon: Ana De La Torre MD;  Location: [facility] OR [address] [place] [address];  Service: OB/GYN;  Laterality: Bilateral;    MAPPING, LYMPH NODE, SENTINEL N/A 2024    Procedure: MAPPING, LYMPH NODE, SENTINEL;  Surgeon: Ana De La Torre MD;  Location: [facility] OR [address] [place] [address];  Service: OB/GYN;  Laterality: N/A;    RADICAL ABDOMINAL HYSTERECTOMY N/A 2024    Procedure: HYSTERECTOMY, RADICAL, ABDOMINAL;  Surgeon: Ana De La Torre MD;  Location: [facility] OR [address] [place] [address];  Service: OB/GYN;  Laterality: N/A;  2.5 hr case     Social History     Socioeconomic History    Marital status: Single   Tobacco Use    Smoking status: Former     Current packs/day: 0.00     Average packs/day: 1 pack/day for 47.5 years (47.5 ttl pk-yrs)     Types: Cigarettes     Start date: 1976     Quit date: 2023     Years since quittin.0     Passive exposure: Never    Smokeless tobacco: Never    Tobacco  comments:     6/28/23 Active participant with smoking cessation. 7/20/23 QUIT SMOKING 7/1/23.    Substance and Sexual Activity    Alcohol use: No    Drug use: Yes     Types: Marijuana, Benzodiazepines     Comment: as prescribed    Sexual activity: Yes     Partners: Male     Birth control/protection: See Surgical Hx     Social Determinants of Health     Financial Resource Strain: Low Risk  (1/23/2024)    Overall Financial Resource Strain (CARDIA)     Difficulty of Paying Living Expenses: Not very hard   Recent Concern: Financial Resource Strain - High Risk (1/5/2024)    Overall Financial Resource Strain (CARDIA)     Difficulty of Paying Living Expenses: Very hard   Food Insecurity: Food Insecurity Present (1/23/2024)    Hunger Vital Sign     Worried About Running Out of Food in the Last Year: Often true     Ran Out of Food in the Last Year: Sometimes true   Transportation Needs: No Transportation Needs (1/23/2024)    PRAPARE - Transportation     Lack of Transportation (Medical): No     Lack of Transportation (Non-Medical): No   Physical Activity: Unknown (1/5/2024)    Exercise Vital Sign     Days of Exercise per Week: 7 days   Stress: No Stress Concern Present (1/23/2024)    [employer] [place] [employer] - Occupational Stress Questionnaire     Feeling of Stress : Only a little   Recent Concern: Stress - Stress Concern Present (1/5/2024)    [employer] [place] [employer] - Occupational Stress Questionnaire     Feeling of Stress : Rather much   Housing Stability: High Risk (1/23/2024)    Housing Stability Vital Sign     Unable to Pay for Housing in the Last Year: Yes     Number of Places Lived in the Last Year: 1     Unstable Housing in the Last Year: No     No family history on file.  Medication List with Changes/Refills   Current Medications    ACETAMINOPHEN (TYLENOL) 325 MG TABLET    Take 2 tablets by mouth every 6 hours. Alternate between ibuprofen & tylenol every 3 hours. For example: @0800:  ibuprofen 600mg @1100: tylenol 650mg @1400: ibuprofen 600mg @1700: tylenol 650 mg @2000: ibuprofen 600mg    CYCLOBENZAPRINE (FLEXERIL) 10 MG TABLET    Take 1 tablet (10 mg total) by mouth every evening.    ROSUVASTATIN (CRESTOR) 20 MG TABLET    Take 1 tablet (20 mg total) by mouth every evening.     Review of patient's allergies indicates:   Allergen Reactions    Aspirin      STOMACH BURNING    Lortab [hydrocodone-acetaminophen] Other (See Comments)     Dizziness and nausea    Opioids-meperidine and related Rash       QUALITY OF LIFE: 100%- Normal, No Complaints, No Evidence of Disease    Vitals:    08/02/24 1356   BP: 112/69   Pulse: 75   Resp: 16   SpO2: (!) 94%   Weight: 75.9 kg (167 lb 6.4 oz)   PainSc: 0-No pain     Body mass index is 27.02 kg/m².    PHYSICAL EXAM:   GENERAL: alert; in no apparent distress.   HEAD: normocephalic, atraumatic.  EYES: pupils are equal, round, reactive to light and accommodation. Sclera anicteric. Conjunctiva not injected.   NOSE/THROAT: no nasal erythema or rhinorrhea. Oropharynx pink, without erythema, ulcerations or thrush.   NECK: no cervical motion rigidity; supple with no masses.  CHEST: Patient is speaking comfortably on room air with normal work of breathing without using accessory muscles of respiration.  CARDIOVASCULAR: regular rate and rhythm  ABDOMEN: soft, nontender, nondistended.   MUSCULOSKELETAL: no tenderness to palpation along the spine or scapulae. Normal range of motion.  NEUROLOGIC: cranial nerves II-XII intact bilaterally. Strength 5/5 in bilateral upper and lower extremities. No sensory deficits appreciated. Normal gait.  EXTREMITIES: no clubbing, cyanosis, edema.  SKIN: no erythema, rashes, ulcerations noted.   PELVIC: deferred    ANCILLARY DATA: All images reviewed personally by dictating physician.  6/24/24 CT C/AP  Impression:  2 mm nodule in the left lung.  For a solid nodule <6 mm, Fleischner Society 2017 guidelines recommend no routine follow up for a  low risk patient, or follow-up with non-contrast chest CT at 12 months in a high risk patient.  2.6 cm indeterminate left renal mass may well be complex cyst.  It is slightly larger than on the prior the study and suggest follow-up ultrasound for characterization.  12 mm lucent lesion left-sided T12 not significantly changed compared to prior PET-CT of 12/08/2023 suggesting nonaggressive process.  Small nodules in the thyroid gland not significantly changed compared to the PET-CT.  Additional findings as detailed above including prior hysterectomy.    ASSESSMENT: Regina Brown is a female with Stage IB2 cervical SCC w/ (+)LVSI s/p GILDA/BSO/SLN  PLAN:     - RT well tolerated.  Patient denies symptoms of cystitis, colitis/proctitis.  She has been given a vaginal dilator and remains sexually active.  She understands the importance of maintaining patency of the vagina for surveillance pelvic exams.  - ROSALINE   - Imaging reviewed.  No evidence of recurrence.  T12 lucent lesion unchanged--re-image if symptoms develop. Consider L renal US; sania if develop flank pain or hematuria  - Recommend yearly LD-CT C (former smoker); PCP cc'd  - Follow up with Dr. De La Torre for q3m pelvic exams  - Return to clinic prn.    All questions answered and contact information provided. Patient understands free to call us anytime with any questions or concerns regarding radiation therapy.    I have personally seen and evaluated this patient with a moderate to high complexity diagnosis.      45 minutes were dedicated to reviewing/interpreting pertinent laboratory/imaging/pathology as well as follow-up with concurrent consultants; reviewing and performing history and physical; counseling patient on continuing oncologic recommendations; documentation in the electronic medical record including ordering of additional tests and/or radiation treatment protocol; and coordination of care with physicians with referrals placed as appropriate.    PHYSICIAN:  Jarred Telles Jr, MD

## 2024-08-28 ENCOUNTER — TELEPHONE (OUTPATIENT)
Dept: CARDIOLOGY | Facility: CLINIC | Age: 64
End: 2024-08-28
Payer: MEDICARE

## 2024-08-28 NOTE — TELEPHONE ENCOUNTER
----- Message from Ava Mcgrath sent at 8/28/2024 10:51 AM CDT -----  Regarding: Sooner Appointment Request  Contact: patient at [phone]  Type:  Sooner Appointment Request    Name of Caller:  patient at [phone]    When is the first available appointment?  none  Symptoms:  heart murmur  Additional Information:  Please call and advise. Thank you

## 2024-09-25 ENCOUNTER — LAB VISIT (OUTPATIENT)
Dept: LAB | Facility: HOSPITAL | Age: 64
End: 2024-09-25
Attending: FAMILY MEDICINE
Payer: MEDICARE

## 2024-09-25 DIAGNOSIS — E78.00 HYPERCHOLESTEREMIA: ICD-10-CM

## 2024-09-25 DIAGNOSIS — E55.9 VITAMIN D DEFICIENCY: Primary | ICD-10-CM

## 2024-09-25 DIAGNOSIS — F41.9 ANXIETY DISORDER, UNSPECIFIED: Chronic | ICD-10-CM

## 2024-09-25 DIAGNOSIS — R09.89 BRUIT OF RIGHT CAROTID ARTERY: ICD-10-CM

## 2024-09-25 DIAGNOSIS — R79.9 ABNORMAL FINDING OF BLOOD CHEMISTRY, UNSPECIFIED: ICD-10-CM

## 2024-09-25 DIAGNOSIS — Z13.220 LIPID SCREENING: ICD-10-CM

## 2024-09-25 DIAGNOSIS — Z13.1 DIABETES MELLITUS SCREENING: ICD-10-CM

## 2024-09-25 DIAGNOSIS — Z13.29 THYROID DISORDER SCREEN: ICD-10-CM

## 2024-09-25 DIAGNOSIS — Z79.899 OTHER LONG TERM (CURRENT) DRUG THERAPY: ICD-10-CM

## 2024-09-25 DIAGNOSIS — Z00.00 ROUTINE MEDICAL EXAM: ICD-10-CM

## 2024-09-25 LAB
25(OH)D3+25(OH)D2 SERPL-MCNC: 26 NG/ML (ref 30–96)
ALBUMIN SERPL BCP-MCNC: 4.4 G/DL (ref 3.5–5.2)
ALBUMIN/CREAT UR: NORMAL UG/MG (ref 0–30)
ALP SERPL-CCNC: 63 U/L (ref 55–135)
ALT SERPL W/O P-5'-P-CCNC: 16 U/L (ref 10–44)
ANION GAP SERPL CALC-SCNC: 10 MMOL/L (ref 8–16)
AST SERPL-CCNC: 17 U/L (ref 10–40)
BASOPHILS # BLD AUTO: 0.04 K/UL (ref 0–0.2)
BASOPHILS NFR BLD: 1.7 % (ref 0–1.9)
BILIRUB SERPL-MCNC: 1 MG/DL (ref 0.1–1)
BUN SERPL-MCNC: 10 MG/DL (ref 8–23)
CALCIUM SERPL-MCNC: 10.5 MG/DL (ref 8.7–10.5)
CHLORIDE SERPL-SCNC: 107 MMOL/L (ref 95–110)
CHOLEST SERPL-MCNC: 171 MG/DL (ref 120–199)
CHOLEST/HDLC SERPL: 3.2 {RATIO} (ref 2–5)
CO2 SERPL-SCNC: 26 MMOL/L (ref 23–29)
CREAT SERPL-MCNC: 0.8 MG/DL (ref 0.5–1.4)
CREAT UR-MCNC: 31.5 MG/DL (ref 15–325)
DIFFERENTIAL METHOD BLD: ABNORMAL
EOSINOPHIL # BLD AUTO: 0.1 K/UL (ref 0–0.5)
EOSINOPHIL NFR BLD: 3.3 % (ref 0–8)
ERYTHROCYTE [DISTWIDTH] IN BLOOD BY AUTOMATED COUNT: 12.6 % (ref 11.5–14.5)
EST. GFR  (NO RACE VARIABLE): >60 ML/MIN/1.73 M^2
ESTIMATED AVG GLUCOSE: 94 MG/DL (ref 68–131)
GLUCOSE SERPL-MCNC: 94 MG/DL (ref 70–110)
HBA1C MFR BLD: 4.9 % (ref 4.5–6.2)
HCT VFR BLD AUTO: 41.9 % (ref 37–48.5)
HDLC SERPL-MCNC: 54 MG/DL (ref 40–75)
HDLC SERPL: 31.6 % (ref 20–50)
HGB BLD-MCNC: 13.6 G/DL (ref 12–16)
IMM GRANULOCYTES # BLD AUTO: 0 K/UL (ref 0–0.04)
IMM GRANULOCYTES NFR BLD AUTO: 0 % (ref 0–0.5)
LDLC SERPL CALC-MCNC: 93.8 MG/DL (ref 63–159)
LYMPHOCYTES # BLD AUTO: 0.7 K/UL (ref 1–4.8)
LYMPHOCYTES NFR BLD: 28.6 % (ref 18–48)
MCH RBC QN AUTO: 32.4 PG (ref 27–31)
MCHC RBC AUTO-ENTMCNC: 32.5 G/DL (ref 32–36)
MCV RBC AUTO: 100 FL (ref 82–98)
MICROALBUMIN UR DL<=1MG/L-MCNC: <2.5 UG/ML
MONOCYTES # BLD AUTO: 0.2 K/UL (ref 0.3–1)
MONOCYTES NFR BLD: 8.7 % (ref 4–15)
NEUTROPHILS # BLD AUTO: 1.4 K/UL (ref 1.8–7.7)
NEUTROPHILS NFR BLD: 57.7 % (ref 38–73)
NONHDLC SERPL-MCNC: 117 MG/DL
NRBC BLD-RTO: 0 /100 WBC
PLATELET # BLD AUTO: 184 K/UL (ref 150–450)
PMV BLD AUTO: 9.7 FL (ref 9.2–12.9)
POTASSIUM SERPL-SCNC: 4.1 MMOL/L (ref 3.5–5.1)
PROT SERPL-MCNC: 7.5 G/DL (ref 6–8.4)
RBC # BLD AUTO: 4.2 M/UL (ref 4–5.4)
SODIUM SERPL-SCNC: 143 MMOL/L (ref 136–145)
TRIGL SERPL-MCNC: 116 MG/DL (ref 30–150)
TSH SERPL DL<=0.005 MIU/L-ACNC: 1.68 UIU/ML (ref 0.4–4)
VIT B12 SERPL-MCNC: 181 PG/ML (ref 210–950)
WBC # BLD AUTO: 2.41 K/UL (ref 3.9–12.7)

## 2024-09-25 PROCEDURE — 80061 LIPID PANEL: CPT | Performed by: FAMILY MEDICINE

## 2024-09-25 PROCEDURE — 80053 COMPREHEN METABOLIC PANEL: CPT | Performed by: FAMILY MEDICINE

## 2024-09-25 PROCEDURE — 82043 UR ALBUMIN QUANTITATIVE: CPT | Performed by: FAMILY MEDICINE

## 2024-09-25 PROCEDURE — 85025 COMPLETE CBC W/AUTO DIFF WBC: CPT | Performed by: FAMILY MEDICINE

## 2024-09-25 PROCEDURE — 83036 HEMOGLOBIN GLYCOSYLATED A1C: CPT | Performed by: FAMILY MEDICINE

## 2024-09-25 PROCEDURE — 82607 VITAMIN B-12: CPT | Performed by: FAMILY MEDICINE

## 2024-09-25 PROCEDURE — 82570 ASSAY OF URINE CREATININE: CPT | Performed by: FAMILY MEDICINE

## 2024-09-25 PROCEDURE — 84443 ASSAY THYROID STIM HORMONE: CPT | Performed by: FAMILY MEDICINE

## 2024-09-25 PROCEDURE — 82306 VITAMIN D 25 HYDROXY: CPT | Performed by: FAMILY MEDICINE

## 2024-09-25 RX ORDER — ERGOCALCIFEROL 1.25 MG/1
50000 CAPSULE ORAL
Qty: 13 CAPSULE | Refills: 3 | Status: SHIPPED | OUTPATIENT
Start: 2024-09-25

## 2024-10-08 ENCOUNTER — OFFICE VISIT (OUTPATIENT)
Dept: GYNECOLOGIC ONCOLOGY | Facility: CLINIC | Age: 64
End: 2024-10-08
Payer: MEDICARE

## 2024-10-08 VITALS
OXYGEN SATURATION: 100 % | SYSTOLIC BLOOD PRESSURE: 148 MMHG | DIASTOLIC BLOOD PRESSURE: 98 MMHG | RESPIRATION RATE: 16 BRPM | HEIGHT: 66 IN | WEIGHT: 166.69 LBS | BODY MASS INDEX: 26.79 KG/M2 | TEMPERATURE: 96 F | HEART RATE: 113 BPM

## 2024-10-08 DIAGNOSIS — Z08 ENCOUNTER FOR FOLLOW-UP SURVEILLANCE OF CERVICAL CANCER: ICD-10-CM

## 2024-10-08 DIAGNOSIS — C53.8 MALIGNANT NEOPLASM OF OVERLAPPING SITES OF CERVIX: Primary | ICD-10-CM

## 2024-10-08 DIAGNOSIS — Z85.41 ENCOUNTER FOR FOLLOW-UP SURVEILLANCE OF CERVICAL CANCER: ICD-10-CM

## 2024-10-08 PROCEDURE — 3080F DIAST BP >= 90 MM HG: CPT | Mod: CPTII,S$GLB,,

## 2024-10-08 PROCEDURE — 1159F MED LIST DOCD IN RCRD: CPT | Mod: CPTII,S$GLB,,

## 2024-10-08 PROCEDURE — 99999 PR PBB SHADOW E&M-EST. PATIENT-LVL III: CPT | Mod: PBBFAC,,,

## 2024-10-08 PROCEDURE — 1160F RVW MEDS BY RX/DR IN RCRD: CPT | Mod: CPTII,S$GLB,,

## 2024-10-08 PROCEDURE — 3008F BODY MASS INDEX DOCD: CPT | Mod: CPTII,S$GLB,,

## 2024-10-08 PROCEDURE — 3044F HG A1C LEVEL LT 7.0%: CPT | Mod: CPTII,S$GLB,,

## 2024-10-08 PROCEDURE — 3077F SYST BP >= 140 MM HG: CPT | Mod: CPTII,S$GLB,,

## 2024-10-08 PROCEDURE — G2211 COMPLEX E/M VISIT ADD ON: HCPCS | Mod: S$GLB,,,

## 2024-10-08 PROCEDURE — 99213 OFFICE O/P EST LOW 20 MIN: CPT | Mod: S$GLB,,,

## 2024-10-08 NOTE — PROGRESS NOTES
Subjective:      Patient ID: Regina Brown is a 64 y.o. female.    Chief Complaint: 3 Month Follow Up (Malignant neoplasm of overlapping sites of cervix//)    HPI  PET 12/8/2023  - Circumferential hypermetabolism of the cervix consistent with the patient's known squamous cell carcinoma.  There is no significant extension of abnormal hypermetabolism into the surrounding soft tissues and there is no evidence of locoregional lymphadenopathy or distant metastatic disease..    S/p open RH/BSO/SLN 1/22/2024    Stage IB2 poorly differentiated SCC of the cervix HPV dependent, tumor size 2.3cm, middle third stromal invasion, +LVSI, negative parametria, negative margins, negative sentinel lymph nodes.   Meets Sedlis criteria. Recommend adjuvant EBRT.      Completed EBRT 4/2/2024    CT CAP 6/24/2024 obtained by Dr. Ernst shows no obvious evidence of disease.     Today's visit:  Presents today for surveillance visit. Doing well and without complaints. Specifically denies changes In her bowel or bladder habits, N/V, pain, or vaginal bleeding. Sexually active and using vaginal dilator.     Disease free interval 6 months.      ____________________________  Referred by Dr. Fragoso for newly diagnosed squamous cell carcinoma of the cervix.      Pap smear from 9/20/2023 noted atypical squamous cells can not exclude a high-grade intraepithelial lesion. HPV was positive for high-risk type 16. Negative for type 18.      Colposcopic biopsies 10/4/2023  Final Pathologic Diagnosis 1.  Endocervical curettage: Fragments of ectocervix with severe squamous dysplasia (GIA 3/HSIL) admixed with scant benign endocervix   2. Cervix at 5, biopsy:     Severe squamous dysplasia (GIA 3/HSIL)       Underwent LEEP 11/27/2023   1. CERVIX, CONIZATION:   - INVASIVE, MODERATE TO POORLY DIFFERENTIATED SQUAMOUS CELL CARCINOMA   - MARGINS POSITIVE FOR CARCINOMA     2. ENDOCERVIX, CURETTAGE:   - FRAGMENTS OF SQUAMOUS CELL CARCINOMA      Prior abdominal  surgeries include open appendectomy as a child.  x 3.   Review of Systems   Constitutional:  Negative for appetite change, chills, fatigue and fever.   HENT:  Negative for mouth sores.    Respiratory:  Negative for cough and shortness of breath.    Cardiovascular:  Negative for leg swelling.   Gastrointestinal:  Negative for abdominal pain, blood in stool, constipation and diarrhea.   Endocrine: Negative for cold intolerance.   Genitourinary:  Negative for dysuria and vaginal bleeding.   Musculoskeletal:  Negative for myalgias.   Skin:  Negative for rash.   Allergic/Immunologic: Negative.    Neurological:  Negative for weakness and numbness.   Hematological:  Negative for adenopathy. Does not bruise/bleed easily.   Psychiatric/Behavioral:  Negative for confusion.        Objective:   Physical Exam:   Constitutional: She is oriented to person, place, and time. She appears well-developed and well-nourished.    HENT:   Head: Normocephalic and atraumatic.    Eyes: Pupils are equal, round, and reactive to light. EOM are normal.    Neck: No thyromegaly present.    Cardiovascular:  Normal rate, regular rhythm and intact distal pulses.             Pulmonary/Chest: Effort normal and breath sounds normal. No respiratory distress. She has no wheezes.        Abdominal: Soft. Bowel sounds are normal. She exhibits no distension and no mass. There is no abdominal tenderness.     Genitourinary:    Vagina normal.      Pelvic exam was performed with patient supine.   There is no lesion on the right labia. There is no lesion on the left labia. Right adnexum displays no mass. Left adnexum displays no mass. Vagina exhibits no lesion. No bleeding in the vagina. Cervix is absent.Uterus is absent.           Musculoskeletal: Normal range of motion and moves all extremeties.      Lymphadenopathy:     She has no cervical adenopathy. No inguinal adenopathy noted on the right or left side.        Right: No supraclavicular adenopathy present.         Left: No supraclavicular adenopathy present.    Neurological: She is alert and oriented to person, place, and time.    Skin: Skin is warm and dry. No rash noted.    Psychiatric: She has a normal mood and affect.       Assessment:     1. Malignant neoplasm of overlapping sites of cervix    2. Encounter for follow-up surveillance of cervical cancer          Plan:     No orders of the defined types were placed in this encounter.    No evidence of disease on today's exam  Feels well, no new symptoms  Disease free interval 6 months.     RTC 3 months or sooner if needed.     I spent approximately 30 minutes reviewing the available records and evaluating the patient, out of which over 50% of the time was spent face to face with the patient in counseling and coordinating this patient's care.      ONGOING COMPLEXITY BILLING  Visit today is associated with current or anticipated ongoing medical care related to this patients single serious condition/complex condition: cervical cancer

## 2024-10-15 ENCOUNTER — OFFICE VISIT (OUTPATIENT)
Dept: FAMILY MEDICINE | Facility: CLINIC | Age: 64
End: 2024-10-15
Payer: MEDICARE

## 2024-10-15 VITALS
HEIGHT: 66 IN | WEIGHT: 167 LBS | SYSTOLIC BLOOD PRESSURE: 122 MMHG | HEART RATE: 81 BPM | DIASTOLIC BLOOD PRESSURE: 90 MMHG | OXYGEN SATURATION: 98 % | BODY MASS INDEX: 26.84 KG/M2

## 2024-10-15 DIAGNOSIS — Z13.29 THYROID DISORDER SCREEN: ICD-10-CM

## 2024-10-15 DIAGNOSIS — Z13.1 DIABETES MELLITUS SCREENING: ICD-10-CM

## 2024-10-15 DIAGNOSIS — E53.8 VITAMIN B12 DEFICIENCY: ICD-10-CM

## 2024-10-15 DIAGNOSIS — R09.89 BILATERAL CAROTID BRUITS: ICD-10-CM

## 2024-10-15 DIAGNOSIS — E78.2 MIXED HYPERLIPIDEMIA: ICD-10-CM

## 2024-10-15 DIAGNOSIS — Z79.899 OTHER LONG TERM (CURRENT) DRUG THERAPY: ICD-10-CM

## 2024-10-15 DIAGNOSIS — Z00.00 ROUTINE MEDICAL EXAM: ICD-10-CM

## 2024-10-15 DIAGNOSIS — E78.00 HYPERCHOLESTEREMIA: ICD-10-CM

## 2024-10-15 DIAGNOSIS — D06.9 HIGH GRADE SQUAMOUS INTRAEPITHELIAL LESION (HGSIL), GRADE 3 CIN, ON BIOPSY OF CERVIX: Primary | ICD-10-CM

## 2024-10-15 DIAGNOSIS — G47.30 SLEEP APNEA, UNSPECIFIED TYPE: ICD-10-CM

## 2024-10-15 DIAGNOSIS — E55.9 VITAMIN D DEFICIENCY: ICD-10-CM

## 2024-10-15 DIAGNOSIS — R79.9 ABNORMAL FINDING OF BLOOD CHEMISTRY, UNSPECIFIED: ICD-10-CM

## 2024-10-15 DIAGNOSIS — D09.8 CARCINOMA IN SITU OF OTHER SPECIFIED SITES: ICD-10-CM

## 2024-10-15 PROCEDURE — 3074F SYST BP LT 130 MM HG: CPT | Mod: CPTII,S$GLB,, | Performed by: FAMILY MEDICINE

## 2024-10-15 PROCEDURE — 3008F BODY MASS INDEX DOCD: CPT | Mod: CPTII,S$GLB,, | Performed by: FAMILY MEDICINE

## 2024-10-15 PROCEDURE — 99214 OFFICE O/P EST MOD 30 MIN: CPT | Mod: S$GLB,,, | Performed by: FAMILY MEDICINE

## 2024-10-15 PROCEDURE — 1159F MED LIST DOCD IN RCRD: CPT | Mod: CPTII,S$GLB,, | Performed by: FAMILY MEDICINE

## 2024-10-15 PROCEDURE — 3080F DIAST BP >= 90 MM HG: CPT | Mod: CPTII,S$GLB,, | Performed by: FAMILY MEDICINE

## 2024-10-15 PROCEDURE — 3044F HG A1C LEVEL LT 7.0%: CPT | Mod: CPTII,S$GLB,, | Performed by: FAMILY MEDICINE

## 2024-10-15 PROCEDURE — G2211 COMPLEX E/M VISIT ADD ON: HCPCS | Mod: S$GLB,,, | Performed by: FAMILY MEDICINE

## 2024-10-15 PROCEDURE — 99999 PR PBB SHADOW E&M-EST. PATIENT-LVL III: CPT | Mod: PBBFAC,,, | Performed by: FAMILY MEDICINE

## 2024-10-15 NOTE — PROGRESS NOTES
Subjective:       Patient ID: Regina Brown is a 64 y.o. female.    Chief Complaint: Follow-up (6 month/Discuss what kind of immuno vacc she can have now finished with radiation)      Past Medical History:   Diagnosis Date    Abnormal Pap smear of cervix     Cervical cancer     High cholesterol     HTN (hypertension)     Murmur, heart     Sleep apnea     cpap       Past Surgical History:   Procedure Laterality Date    APPENDECTOMY  1976    BILATERAL SALPINGO-OOPHORECTOMY (BSO) N/A 1/22/2024    Procedure: SALPINGO-OOPHORECTOMY, BILATERAL;  Surgeon: Ana De La Torre MD;  Location: [address];  Service: OB/GYN;  Laterality: N/A;    CONIZATION OF CERVIX USING LOOP ELECTROSURGICAL EXCISION PROCEDURE (LEEP) N/A 11/27/2023    Procedure: LEEP CONIZATION, CERVIX AND OTHER INDICATED PROCEDURES, Endocervical Curettage;  Surgeon: Phoenix Fragoso MD;  Location: [facility] OR;  Service: OB/GYN;  Laterality: N/A;    ELBOW SURGERY Right 1998    FINGER MASS EXCISION Left 05/25/2023    Procedure: EXCISION, MASS, FINGER;  Surgeon: Migel Richards MD;  Location: [facility] OR;  Service: Orthopedics;  Laterality: Left;    LYMPH NODE BIOPSY Bilateral 1/22/2024    Procedure: BIOPSY, LYMPH NODE;  Surgeon: Ana De La Torre MD;  Location: [address];  Service: OB/GYN;  Laterality: Bilateral;    MAPPING, LYMPH NODE, SENTINEL N/A 1/22/2024    Procedure: MAPPING, LYMPH NODE, SENTINEL;  Surgeon: Ana De La Torre MD;  Location: [address];  Service: OB/GYN;  Laterality: N/A;    RADICAL ABDOMINAL HYSTERECTOMY N/A 1/22/2024    Procedure: HYSTERECTOMY, RADICAL, ABDOMINAL;  Surgeon: Ana De La Torre MD;  Location: [facility] OR [address] [place] [address];  Service: OB/GYN;  Laterality: N/A;  2.5 hr case        Social History     Socioeconomic History    Marital status: Single   Tobacco Use    Smoking status: Former     Current packs/day: 0.00     Average packs/day: 1 pack/day for 47.5 years (47.5 ttl pk-yrs)     Types: Cigarettes     Start date: 01/1976     Quit  date: 2023     Years since quittin.2     Passive exposure: Past    Smokeless tobacco: Never    Tobacco comments:     23 Active participant with smoking cessation. 23 QUIT SMOKING 23.    Substance and Sexual Activity    Alcohol use: No    Drug use: Yes     Types: Marijuana, Benzodiazepines     Comment: as prescribed    Sexual activity: Yes     Partners: Male     Birth control/protection: See Surgical Hx, Post-menopausal     Social Drivers of Health     Financial Resource Strain: Low Risk  (2024)    Overall Financial Resource Strain (CARDIA)     Difficulty of Paying Living Expenses: Not very hard   Recent Concern: Financial Resource Strain - High Risk (2024)    Overall Financial Resource Strain (CARDIA)     Difficulty of Paying Living Expenses: Very hard   Food Insecurity: Food Insecurity Present (2024)    Hunger Vital Sign     Worried About Running Out of Food in the Last Year: Often true     Ran Out of Food in the Last Year: Sometimes true   Transportation Needs: No Transportation Needs (2024)    PRAPARE - Transportation     Lack of Transportation (Medical): No     Lack of Transportation (Non-Medical): No   Physical Activity: Unknown (2024)    Exercise Vital Sign     Days of Exercise per Week: 7 days   Stress: No Stress Concern Present (2024)    [employer] - Occupational Stress Questionnaire     Feeling of Stress : Only a little   Recent Concern: Stress - Stress Concern Present (2024)    [employer] - Occupational Stress Questionnaire     Feeling of Stress : Rather much   Housing Stability: High Risk (2024)    Housing Stability Vital Sign     Unable to Pay for Housing in the Last Year: Yes     Number of Places Lived in the Last Year: 1     Unstable Housing in the Last Year: No       No family history on file.    Review of patient's allergies indicates:   Allergen Reactions    Lortab  [hydrocodone-acetaminophen] Nausea And Vomiting    Aspirin Other (See Comments)     STOMACH BURNING    Opioids-meperidine and related Rash          Current Outpatient Medications:     acetaminophen (TYLENOL) 325 MG tablet, Take 2 tablets by mouth every 6 hours. Alternate between ibuprofen & tylenol every 3 hours. For example: @0800: ibuprofen 600mg @1100: tylenol 650mg @1400: ibuprofen 600mg @1700: tylenol 650 mg @2000: ibuprofen 600mg, Disp: 60 tablet, Rfl: 3    cyanocobalamin, vitamin B-12, (VITAMIN B-12 ORAL), Take by mouth Daily., Disp: , Rfl:     cyclobenzaprine (FLEXERIL) 10 MG tablet, Take 1 tablet (10 mg total) by mouth every evening., Disp: 90 tablet, Rfl: 3    ergocalciferol (ERGOCALCIFEROL) 50,000 unit Cap, Take 1 capsule (50,000 Units total) by mouth every 7 days., Disp: 13 capsule, Rfl: 3    rosuvastatin (CRESTOR) 20 MG tablet, Take 1 tablet (20 mg total) by mouth every evening., Disp: 90 tablet, Rfl: 3    Ms Brown is a 63 yo female her for a routine medical exam. She had a radical hysterectomy in 2023 and had follow up radiation, she has now been declared cancer free. Surgical procedure done by Dr De La Torre. She follows up with Dr De La Torre Q 3 months for 3 more years. She has completed all radiation treatments ans is being monitored with PET scans and with her 3 month appointments.    Most recent labs were done 2024 and include the followin. CBC within acceptable range   2. CMP within acceptable range   3. Vitamin-D low at 26 ng/mL, B12 also low at 181 pg/mL   4. Hemoglobin A1c 4.9%  5. TSH 1.683  6. Microalbumin creatinine ratio within normal range     She had a CT done on 2024 for abdominal pain.  She did have malignant neoplasm of overlapping sites of the uterus and cervix which was surgically removed, so a CT was done to re-evaluate the abdomen pelvis      Details    Reading Physician Reading Date Result Priority  Rebecca Guzmán MD   [phone] 2024 Routine    Addenda      Also to be added to the impression and corresponding as mentioned in the body of the report is mild dilatation of and circumferential wall thickening of small bowel in the left side of the abdomen suggesting enteritis.  No surrounding fat stranding.        Electronically signed by:Rebecca Guzmán MD  Date:                                            06/24/2024  Time:                                           11:28  Signed by Rebecca Guzmán MD on 6/24/2024 11:30  Narrative & Impression  EXAMINATION:  CT CHEST ABDOMEN PELVIS WITH IV CONTRAST (XPD)     CLINICAL HISTORY:  MONITOR CANCER; Malignant neoplasm of overlapping sites of cervix uteri     TECHNIQUE:  Low dose axial images, sagittal and coronal reformations were obtained from the thoracic inlet to the pubic symphysis following the IV administration of 75 mL of Omnipaque 350 and no p.o. contrast     COMPARISON:  Images from PET-CT of 10/08/2023     FINDINGS:  Chest; visualized structures base of the neck; 5 mm nodule the thyroid gland without suspicious characteristics.  3 mm nodule in couple microcalcifications left lobe of the thyroid gland with findings not significantly changed compared to that prior PET-CT.     No significant hilar or mediastinal adenopathy.  There are vascular calcifications including coronary artery calcifications.  There is no pleural or pericardial effusion there is 2 mm left upper lobe nodule for example axial series 6 image 122 is not evident on the PET-CT which may well be due to the technical differences between the 2 exams.  There are apical blebs and there is mild apical scarring.  There is very slight peribronchial thickening this may be related to bronchial wall thickening or possibly even mucous plugging.  Also seen coronal series 605, image 122.     Abdomen and pelvis; liver unremarkable appearance.  No masses.  Spleen, adrenal glands, pancreas unremarkable appearance.  Abdominal aorta tapers without aneurysmal dilatation.   No calcified stones the gallbladder or CT findings of acute cholecystitis or biliary duct dilatation.  There is small fat containing umbilical hernia.  There is diverticulosis without CT findings of acute diverticulitis.     There are very mildly dilated fluid-filled loops of small bowel in the circumferential wall thickening of small bowel in the left side of the abdomen for example axial series 3, images 56 through 102.  Nonobstructive in appearance.  Reflect mild enteritis.  No surrounding fat stranding or fluid and no for abscess.  Trace barely perceptible free fluid the pelvis.  No free intraperitoneal air     2.6 cm left renal mass with CT numbers greater than expected for simple cyst.  Could be complex cyst or solid mass.  Corresponds as was measured at 2.3 cm on the PET-CT and does appear slightly larger today.  Small subcentimeter hypodensity left kidney too small to reliably characterize but likely cyst.  Symmetrical renal enhancement and no hydronephrosis.  The urinary bladder is mildly distended at time of the exam and as visualized is unremarkable appearance     Prior hysterectomy.  Adnexal region unremarkable appearance     No significant pelvic adenopathy seen     Stranding in the anterior abdominal wall suggesting postoperative change.     Osseous structures semi degenerative changes.  Small round sclerotic foci suggesting bone islands.  12 mm lucent/lytic lesion left side of T 12 indeterminate but not extending through the cortex and not significantly changed compared to the prior PET-CT of 12/08/2023 suggesting nonaggressive process.     Impression:     2 mm nodule in the left lung.  For a solid nodule <6 mm, Fleischner Society 2017 guidelines recommend no routine follow up for a low risk patient, or follow-up with non-contrast chest CT at 12 months in a high risk patient.     2.6 cm indeterminate left renal mass may well be complex cyst.  It is slightly larger than on the prior the study and  suggest follow-up ultrasound for characterization.     12 mm lucent lesion left-sided T12 not significantly changed compared to prior PET-CT of 12/08/2023 suggesting nonaggressive process.  Small nodules in the thyroid gland not significantly changed compared to the PET-CT.     Additional findings as detailed above including prior hysterectomy.        Electronically signed by:Rebecca Guzmán MD  Date:                                            06/24/2024  Time:          She also has carotid bruits and carotid ultrasound was done on 05/02/2024.  Those results are as follows:   undefined Status: Final result       undefined Visible to patient: Yes (seen)       undefined Next appt: 01/09/2025 at 09:45 AM in Gynecologic Oncology (Ana De La Torre MD)       undefined Dx: Bilateral carotid bruits    undefined 1 Result Note       undefined 1 Patient Communication  Details    Reading Physician Reading Date Result Priority  Diana Sweet MD   [phone]   [phone] 5/2/2024 Routine    Narrative & Impression  EXAMINATION:  US CAROTID BILATERAL     CLINICAL HISTORY:  Other specified symptoms and signs involving the circulatory and respiratory systems     TECHNIQUE:  Grayscale and color Doppler ultrasound examination of the carotid and vertebral artery systems bilaterally.  Stenosis estimates are per the NASCET measurement criteria.     COMPARISON:  None.     FINDINGS:  Right:     Internal Carotid Artery (ICA) peak systolic velocity 167 cm/sec     ICA/CCA peak systolic velocity ratio: 2.5     Plaque formation: Moderate     Vertebral artery: Antegrade flow and normal waveform.     Left:     Internal Carotid Artery (ICA)  peak systolic velocity 143 cm/sec     ICA/CCA peak systolic velocity ratio: 1.8     Plaque formation: Moderate     Vertebral artery: Antegrade flow and normal waveform.     Impression:     There is moderate, 50-69% stenosis, of bilateral proximal internal carotid arteries.        Electronically signed  by:Diana Sweet MD  Date:                                            05/02/2024  Time:                                           13:29    She was referred to Cardiology, Dr. Cardozo, and had an appointment scheduled for 07/10/2024 at the [facility]        Follow-up  Associated symptoms include fatigue. Pertinent negatives include no abdominal pain, chest pain, chills, congestion, coughing, diaphoresis, fever, nausea, rash or sore throat.      Review of Systems   Constitutional:  Positive for fatigue. Negative for activity change, appetite change, chills, diaphoresis and fever.   HENT:  Negative for congestion, ear pain, postnasal drip, rhinorrhea and sore throat.    Eyes:  Negative for pain, discharge, redness and itching.   Respiratory:  Negative for cough and shortness of breath.    Cardiovascular:  Negative for chest pain, palpitations and leg swelling.   Gastrointestinal:  Negative for abdominal distention, abdominal pain, constipation, diarrhea and nausea.   Genitourinary:  Negative for difficulty urinating, dysuria, frequency and urgency.   Skin:  Negative for color change, rash and wound.        Nails are lifting and hair is falling out probably secondary to radiation    All other systems reviewed and are negative.      Objective:      Physical Exam    Assessment:       1. High grade squamous intraepithelial lesion (HGSIL), grade 3 GIA, on biopsy of cervix    2. Bilateral carotid bruits    3. Mixed hyperlipidemia    4. Sleep apnea, unspecified type        Plan:       High grade squamous intraepithelial lesion (HGSIL), grade 3 GIA, on biopsy of cervix  Comments:  sp hysterectomy and radiation, currently cancer free    Bilateral carotid bruits  Comments:  have done US and will refer to cardiology    Mixed hyperlipidemia  Comments:  stable    Sleep apnea, unspecified type  Comments:  stable on CPAP          Risks, benefits, and side effects were discussed with the patient. All  questions were answered to the fullest satisfaction of the patient, and pt verbalized understanding and agreement to treatment plan. Pt was to call with any new or worsening symptoms, or present to the ER.        Lesly Pelayo MD

## 2024-10-15 NOTE — PROGRESS NOTES
Subjective:       Patient ID: Regina Brown is a 64 y.o. female.    Chief Complaint: Follow-up (6 month/Discuss what kind of immuno vacc she can have now finished with radiation)      Past Medical History:   Diagnosis Date    Abnormal Pap smear of cervix     Cervical cancer     High cholesterol     HTN (hypertension)     Murmur, heart     Sleep apnea     cpap       Past Surgical History:   Procedure Laterality Date    APPENDECTOMY  1976    BILATERAL SALPINGO-OOPHORECTOMY (BSO) N/A 1/22/2024    Procedure: SALPINGO-OOPHORECTOMY, BILATERAL;  Surgeon: Ana De La Torre MD;  Location: [address];  Service: OB/GYN;  Laterality: N/A;    CONIZATION OF CERVIX USING LOOP ELECTROSURGICAL EXCISION PROCEDURE (LEEP) N/A 11/27/2023    Procedure: LEEP CONIZATION, CERVIX AND OTHER INDICATED PROCEDURES, Endocervical Curettage;  Surgeon: Phoenix Fragoso MD;  Location: [facility] OR;  Service: OB/GYN;  Laterality: N/A;    ELBOW SURGERY Right 1998    FINGER MASS EXCISION Left 05/25/2023    Procedure: EXCISION, MASS, FINGER;  Surgeon: Migel Richards MD;  Location: [facility] OR;  Service: Orthopedics;  Laterality: Left;    LYMPH NODE BIOPSY Bilateral 1/22/2024    Procedure: BIOPSY, LYMPH NODE;  Surgeon: Ana De La Torre MD;  Location: [address];  Service: OB/GYN;  Laterality: Bilateral;    MAPPING, LYMPH NODE, SENTINEL N/A 1/22/2024    Procedure: MAPPING, LYMPH NODE, SENTINEL;  Surgeon: Ana De La Torre MD;  Location: [address];  Service: OB/GYN;  Laterality: N/A;    RADICAL ABDOMINAL HYSTERECTOMY N/A 1/22/2024    Procedure: HYSTERECTOMY, RADICAL, ABDOMINAL;  Surgeon: Ana De La Torre MD;  Location: [facility] OR [address] [place] [address];  Service: OB/GYN;  Laterality: N/A;  2.5 hr case        Social History     Socioeconomic History    Marital status: Single   Tobacco Use    Smoking status: Former     Current packs/day: 0.00     Average packs/day: 1 pack/day for 47.5 years (47.5 ttl pk-yrs)     Types: Cigarettes     Start date: 01/1976     Quit  date: 2023     Years since quittin.2     Passive exposure: Past    Smokeless tobacco: Never    Tobacco comments:     23 Active participant with smoking cessation. 23 QUIT SMOKING 23.    Substance and Sexual Activity    Alcohol use: No    Drug use: Yes     Types: Marijuana, Benzodiazepines     Comment: as prescribed    Sexual activity: Yes     Partners: Male     Birth control/protection: See Surgical Hx, Post-menopausal     Social Drivers of Health     Financial Resource Strain: Low Risk  (2024)    Overall Financial Resource Strain (CARDIA)     Difficulty of Paying Living Expenses: Not very hard   Recent Concern: Financial Resource Strain - High Risk (2024)    Overall Financial Resource Strain (CARDIA)     Difficulty of Paying Living Expenses: Very hard   Food Insecurity: Food Insecurity Present (2024)    Hunger Vital Sign     Worried About Running Out of Food in the Last Year: Often true     Ran Out of Food in the Last Year: Sometimes true   Transportation Needs: No Transportation Needs (2024)    PRAPARE - Transportation     Lack of Transportation (Medical): No     Lack of Transportation (Non-Medical): No   Physical Activity: Unknown (2024)    Exercise Vital Sign     Days of Exercise per Week: 7 days   Stress: No Stress Concern Present (2024)    [employer] - Occupational Stress Questionnaire     Feeling of Stress : Only a little   Recent Concern: Stress - Stress Concern Present (2024)    [employer] - Occupational Stress Questionnaire     Feeling of Stress : Rather much   Housing Stability: High Risk (2024)    Housing Stability Vital Sign     Unable to Pay for Housing in the Last Year: Yes     Number of Places Lived in the Last Year: 1     Unstable Housing in the Last Year: No       No family history on file.    Review of patient's allergies indicates:   Allergen Reactions    Lortab  [hydrocodone-acetaminophen] Nausea And Vomiting    Aspirin Other (See Comments)     STOMACH BURNING    Opioids-meperidine and related Rash          Current Outpatient Medications:     acetaminophen (TYLENOL) 325 MG tablet, Take 2 tablets by mouth every 6 hours. Alternate between ibuprofen & tylenol every 3 hours. For example: @0800: ibuprofen 600mg @1100: tylenol 650mg @1400: ibuprofen 600mg @1700: tylenol 650 mg @2000: ibuprofen 600mg, Disp: 60 tablet, Rfl: 3    cyanocobalamin, vitamin B-12, (VITAMIN B-12 ORAL), Take by mouth Daily., Disp: , Rfl:     cyclobenzaprine (FLEXERIL) 10 MG tablet, Take 1 tablet (10 mg total) by mouth every evening., Disp: 90 tablet, Rfl: 3    ergocalciferol (ERGOCALCIFEROL) 50,000 unit Cap, Take 1 capsule (50,000 Units total) by mouth every 7 days., Disp: 13 capsule, Rfl: 3    rosuvastatin (CRESTOR) 20 MG tablet, Take 1 tablet (20 mg total) by mouth every evening., Disp: 90 tablet, Rfl: 3    HPI  Review of Systems    Objective:      Physical Exam    Assessment:       1. High grade squamous intraepithelial lesion (HGSIL), grade 3 GIA, on biopsy of cervix    2. Bilateral carotid bruits    3. Mixed hyperlipidemia    4. Sleep apnea, unspecified type    5. Hypercholesteremia    6. Diabetes mellitus screening    7. Thyroid disorder screen    8. Routine medical exam    9. Other long term (current) drug therapy    10. Abnormal finding of blood chemistry, unspecified    11. Vitamin D deficiency    12. Vitamin B12 deficiency    13. Carcinoma in situ of other specified sites        Plan:         High grade squamous intraepithelial lesion (HGSIL), grade 3 GIA, on biopsy of cervix  Comments:  sp hysterectomy and radiation, currently cancer free    Bilateral carotid bruits  Comments:  have done US and will refer to cardiology  Orders:  -     Lipid Panel; Future; Expected date: 04/15/2025    Mixed hyperlipidemia  Comments:  stable  Orders:  -     Lipid Panel; Future; Expected date: 04/15/2025    Sleep  apnea, unspecified type  Comments:  stable on CPAP    Hypercholesteremia  -     Lipid Panel; Future; Expected date: 04/15/2025    Diabetes mellitus screening  -     Hemoglobin A1C; Future; Expected date: 04/15/2025    Thyroid disorder screen  -     TSH; Future; Expected date: 04/15/2025    Routine medical exam    Other long term (current) drug therapy    Abnormal finding of blood chemistry, unspecified  -     CBC Auto Differential; Future; Expected date: 04/15/2025  -     Comprehensive Metabolic Panel; Future; Expected date: 04/15/2025  -     Hemoglobin A1C; Future; Expected date: 04/15/2025    Vitamin D deficiency  -     Vitamin D; Future; Expected date: 04/15/2025    Vitamin B12 deficiency  -     Vitamin B12; Future; Expected date: 04/15/2025    Carcinoma in situ of other specified sites  -     TSH; Future; Expected date: 04/15/2025        Risks, benefits, and side effects were discussed with the patient. All questions were answered to the fullest satisfaction of the patient, and pt verbalized understanding and agreement to treatment plan. Pt was to call with any new or worsening symptoms, or present to the ER.        Lesly Pelayo MD

## 2024-10-17 ENCOUNTER — PATIENT MESSAGE (OUTPATIENT)
Dept: ADMINISTRATIVE | Facility: HOSPITAL | Age: 64
End: 2024-10-17
Payer: MEDICARE

## 2024-10-18 DIAGNOSIS — Z12.11 SCREENING FOR COLON CANCER: ICD-10-CM

## 2024-11-13 ENCOUNTER — HOSPITAL ENCOUNTER (OUTPATIENT)
Dept: RADIOLOGY | Facility: HOSPITAL | Age: 64
Discharge: HOME | End: 2024-11-13
Attending: FAMILY MEDICINE
Payer: MEDICARE

## 2024-11-13 DIAGNOSIS — R92.8 FOLLOW-UP EXAMINATION OF ABNORMAL MAMMOGRAM: ICD-10-CM

## 2024-11-13 DIAGNOSIS — N63.14 MASS OF LOWER INNER QUADRANT OF RIGHT BREAST: Primary | ICD-10-CM

## 2024-11-13 PROCEDURE — 77066 DX MAMMO INCL CAD BI: CPT | Mod: 26,,, | Performed by: RADIOLOGY

## 2024-11-13 PROCEDURE — 76642 ULTRASOUND BREAST LIMITED: CPT | Mod: 26,50,, | Performed by: RADIOLOGY

## 2024-11-13 PROCEDURE — 76642 ULTRASOUND BREAST LIMITED: CPT | Mod: TC,50

## 2024-11-13 PROCEDURE — 77066 DX MAMMO INCL CAD BI: CPT | Mod: TC

## 2024-11-13 PROCEDURE — 77062 BREAST TOMOSYNTHESIS BI: CPT | Mod: 26,,, | Performed by: RADIOLOGY

## 2024-11-19 DIAGNOSIS — S89.91XA INJURY OF RIGHT KNEE, INITIAL ENCOUNTER: Primary | ICD-10-CM

## 2024-11-20 ENCOUNTER — HOSPITAL ENCOUNTER (OUTPATIENT)
Dept: RADIOLOGY | Facility: HOSPITAL | Age: 64
Discharge: HOME | End: 2024-11-20
Attending: ORTHOPAEDIC SURGERY
Payer: MEDICARE

## 2024-11-20 ENCOUNTER — OFFICE VISIT (OUTPATIENT)
Dept: ORTHOPEDICS | Facility: CLINIC | Age: 64
End: 2024-11-20
Payer: MEDICARE

## 2024-11-20 VITALS — BODY MASS INDEX: 26.93 KG/M2 | WEIGHT: 167.56 LBS | HEIGHT: 66 IN

## 2024-11-20 DIAGNOSIS — S89.91XA INJURY OF RIGHT KNEE, INITIAL ENCOUNTER: ICD-10-CM

## 2024-11-20 DIAGNOSIS — S89.91XA INJURY OF RIGHT KNEE, INITIAL ENCOUNTER: Primary | ICD-10-CM

## 2024-11-20 PROCEDURE — 73562 X-RAY EXAM OF KNEE 3: CPT | Mod: TC,PO,LT

## 2024-11-20 PROCEDURE — 73562 X-RAY EXAM OF KNEE 3: CPT | Mod: 26,59,LT, | Performed by: RADIOLOGY

## 2024-11-20 PROCEDURE — 99999 PR PBB SHADOW E&M-EST. PATIENT-LVL III: CPT | Mod: PBBFAC,,, | Performed by: ORTHOPAEDIC SURGERY

## 2024-11-20 PROCEDURE — 73564 X-RAY EXAM KNEE 4 OR MORE: CPT | Mod: 26,RT,, | Performed by: RADIOLOGY

## 2024-11-20 RX ORDER — IBUPROFEN 600 MG/1
600 TABLET ORAL 3 TIMES DAILY
Qty: 90 TABLET | Refills: 0 | Status: SHIPPED | OUTPATIENT
Start: 2024-11-20

## 2024-11-20 NOTE — PROGRESS NOTES
Subjective:      Patient ID: Regina Brown is a 64 y.o. female.    Chief Complaint: Injury of the Right Knee (DOI: 11/12/2024- fell over a baby gait)    HPI   64-year-old female with a one-week history of right knee discomfort.  She sustained blunt trauma during a fall over a baby gate.  Complaining of pain with prolonged standing walking intermittent swelling.  Denies any other complaints or prior problems with the right knee.  She does have severe arthritis in her left knee which she is aware of.  ROS      Objective:    Ortho Exam       Constitutional:   Patient is alert  and oriented in no acute distress  HEENT:  normocephalic atraumatic; PERRL EOMI  Neck:  Supple without adenopathy  Cardiovascular:  Normal rate and rhythm  Pulmonary:  Normal respiratory effort normal chest wall expansion  Abdominal:  Nonprotuberant nondistended  Musculoskeletal:  Patient has a mildly antalgic gait   She has some healing abrasions over the anterior aspect of the knee   Diffuse medial tenderness with no gross instability or effusion   Neurological:  No focal defect; cranial nerves 2-12 grossly intact  Psychiatric/behavioral:  Mood and behavior normal      X-ray Knee Ortho Right with Flexion  EXAMINATION:  XR KNEE ORTHO RIGHT WITH FLEXION    CLINICAL HISTORY:  Unspecified injury of right lower leg, initial encounter    TECHNIQUE:  AP standing as well as PA flexion standing and Merchant views of both knees were performed.  A lateral view of the right knee is also performed.    COMPARISON:  None.    FINDINGS:  No acute fracture or malalignment.  Relative preservation of joint spaces in the right knee.  Left knee degenerative change is worst in the medial compartment and also present in the patellofemoral compartment.  There are notch osteophytes on the left.  No significant right knee joint effusion.  Atherosclerosis.    Electronically signed by: Dm Renner  Date:    11/20/2024  Time:    10:28       My Radiographs  Findings:    I have personally reviewed radiographs and concur with above findings    Assessment:       Encounter Diagnosis   Name Primary?    Injury of right knee, initial encounter Yes         Plan:        I have discussed medical condition treatment options with her at length including generalized activity restrictions ice elevation compressive wrapping NSAIDs as needed we have provided some rehab exercises for her.  If over the next 3 or 4 weeks symptoms persist follow up in consider injection or MRI.        Past Medical History:   Diagnosis Date    Abnormal Pap smear of cervix     Cervical cancer     High cholesterol     HTN (hypertension)     Murmur, heart     Sleep apnea     cpap     Past Surgical History:   Procedure Laterality Date    APPENDECTOMY  1976    BILATERAL SALPINGO-OOPHORECTOMY (BSO) N/A 1/22/2024    Procedure: SALPINGO-OOPHORECTOMY, BILATERAL;  Surgeon: Ana De La Torre MD;  Location: [facility] OR [address] [place] [address];  Service: OB/GYN;  Laterality: N/A;    CONIZATION OF CERVIX USING LOOP ELECTROSURGICAL EXCISION PROCEDURE (LEEP) N/A 11/27/2023    Procedure: LEEP CONIZATION, CERVIX AND OTHER INDICATED PROCEDURES, Endocervical Curettage;  Surgeon: Phoenix Fragoso MD;  Location: [facility] OR;  Service: OB/GYN;  Laterality: N/A;    ELBOW SURGERY Right 1998    FINGER MASS EXCISION Left 05/25/2023    Procedure: EXCISION, MASS, FINGER;  Surgeon: Migel Richards MD;  Location: [facility] OR;  Service: Orthopedics;  Laterality: Left;    LYMPH NODE BIOPSY Bilateral 1/22/2024    Procedure: BIOPSY, LYMPH NODE;  Surgeon: Ana De La Torre MD;  Location: [facility] OR [address] [place] [address];  Service: OB/GYN;  Laterality: Bilateral;    MAPPING, LYMPH NODE, SENTINEL N/A 1/22/2024    Procedure: MAPPING, LYMPH NODE, SENTINEL;  Surgeon: Ana De La Torre MD;  Location: [facility] OR [address] [place] [address];  Service: OB/GYN;  Laterality: N/A;    RADICAL ABDOMINAL HYSTERECTOMY N/A 1/22/2024    Procedure: HYSTERECTOMY, RADICAL, ABDOMINAL;  Surgeon: Ana De La Torre MD;   Location: [facility] OR [address] [place] [address];  Service: OB/GYN;  Laterality: N/A;  2.5 hr case         Current Outpatient Medications:     acetaminophen (TYLENOL) 325 MG tablet, Take 2 tablets by mouth every 6 hours. Alternate between ibuprofen & tylenol every 3 hours. For example: @0800: ibuprofen 600mg @1100: tylenol 650mg @1400: ibuprofen 600mg @1700: tylenol 650 mg @2000: ibuprofen 600mg, Disp: 60 tablet, Rfl: 3    cyanocobalamin, vitamin B-12, (VITAMIN B-12 ORAL), Take by mouth Daily., Disp: , Rfl:     cyclobenzaprine (FLEXERIL) 10 MG tablet, Take 1 tablet (10 mg total) by mouth every evening., Disp: 90 tablet, Rfl: 3    ergocalciferol (ERGOCALCIFEROL) 50,000 unit Cap, Take 1 capsule (50,000 Units total) by mouth every 7 days., Disp: 13 capsule, Rfl: 3    rosuvastatin (CRESTOR) 20 MG tablet, Take 1 tablet (20 mg total) by mouth every evening., Disp: 90 tablet, Rfl: 3    ibuprofen (ADVIL,MOTRIN) 600 MG tablet, Take 1 tablet (600 mg total) by mouth 3 (three) times daily., Disp: 90 tablet, Rfl: 0    Review of patient's allergies indicates:   Allergen Reactions    Lortab [hydrocodone-acetaminophen] Nausea And Vomiting    Aspirin Other (See Comments)     STOMACH BURNING    Opioids-meperidine and related Rash       No family history on file.  Social History     Occupational History    Not on file   Tobacco Use    Smoking status: Former     Current packs/day: 0.00     Average packs/day: 1 pack/day for 47.5 years (47.5 ttl pk-yrs)     Types: Cigarettes     Start date: 1976     Quit date: 2023     Years since quittin.3     Passive exposure: Past    Smokeless tobacco: Never    Tobacco comments:     23 Active participant with smoking cessation. 23 QUIT SMOKING 23.    Substance and Sexual Activity    Alcohol use: No    Drug use: Yes     Types: Marijuana, Benzodiazepines     Comment: as prescribed    Sexual activity: Yes     Partners: Male     Birth control/protection: See Surgical Hx, Post-menopausal

## 2024-12-06 ENCOUNTER — PATIENT MESSAGE (OUTPATIENT)
Dept: ADMINISTRATIVE | Facility: HOSPITAL | Age: 64
End: 2024-12-06
Payer: MEDICARE

## 2024-12-09 DIAGNOSIS — E55.9 VITAMIN D DEFICIENCY: ICD-10-CM

## 2024-12-09 RX ORDER — ERGOCALCIFEROL 1.25 MG/1
50000 CAPSULE ORAL
Qty: 13 CAPSULE | Refills: 3 | Status: SHIPPED | OUTPATIENT
Start: 2024-12-09

## 2024-12-10 NOTE — TELEPHONE ENCOUNTER
Refill Routing Note   Medication(s) are not appropriate for processing by [facility] for the following reason(s):        Outside of protocol    [facility] action(s):  Route             Appointments  past 12m or future 3m with PCP    Date Provider   Last Visit   10/15/2024 Lesly Pelayo MD   Next Visit   4/16/2025 Lesly Pelayo MD   ED visits in past 90 days: 0        Note composed:6:23 PM 12/09/2024

## 2025-01-30 ENCOUNTER — OFFICE VISIT (OUTPATIENT)
Dept: GYNECOLOGIC ONCOLOGY | Facility: CLINIC | Age: 65
End: 2025-01-30
Payer: MEDICARE

## 2025-01-30 VITALS
WEIGHT: 170 LBS | TEMPERATURE: 97 F | HEIGHT: 66 IN | DIASTOLIC BLOOD PRESSURE: 96 MMHG | HEART RATE: 103 BPM | SYSTOLIC BLOOD PRESSURE: 142 MMHG | BODY MASS INDEX: 27.32 KG/M2 | OXYGEN SATURATION: 98 % | RESPIRATION RATE: 16 BRPM

## 2025-01-30 DIAGNOSIS — Z08 ENCOUNTER FOR FOLLOW-UP SURVEILLANCE OF CERVICAL CANCER: ICD-10-CM

## 2025-01-30 DIAGNOSIS — C53.8 MALIGNANT NEOPLASM OF OVERLAPPING SITES OF CERVIX: Primary | ICD-10-CM

## 2025-01-30 DIAGNOSIS — Z85.41 ENCOUNTER FOR FOLLOW-UP SURVEILLANCE OF CERVICAL CANCER: ICD-10-CM

## 2025-01-30 PROCEDURE — 3077F SYST BP >= 140 MM HG: CPT | Mod: CPTII,S$GLB,, | Performed by: OBSTETRICS & GYNECOLOGY

## 2025-01-30 PROCEDURE — 99999 PR PBB SHADOW E&M-EST. PATIENT-LVL III: CPT | Mod: PBBFAC,,, | Performed by: OBSTETRICS & GYNECOLOGY

## 2025-01-30 PROCEDURE — 1159F MED LIST DOCD IN RCRD: CPT | Mod: CPTII,S$GLB,, | Performed by: OBSTETRICS & GYNECOLOGY

## 2025-01-30 PROCEDURE — 99214 OFFICE O/P EST MOD 30 MIN: CPT | Mod: S$GLB,,, | Performed by: OBSTETRICS & GYNECOLOGY

## 2025-01-30 PROCEDURE — 3008F BODY MASS INDEX DOCD: CPT | Mod: CPTII,S$GLB,, | Performed by: OBSTETRICS & GYNECOLOGY

## 2025-01-30 PROCEDURE — 3080F DIAST BP >= 90 MM HG: CPT | Mod: CPTII,S$GLB,, | Performed by: OBSTETRICS & GYNECOLOGY

## 2025-01-30 NOTE — PROGRESS NOTES
Subjective:      Patient ID: Regina Brown is a 65 y.o. female.    Chief Complaint: 3 Month Follow Up (Cervical/)    HPI  PET 2023  - Circumferential hypermetabolism of the cervix consistent with the patient's known squamous cell carcinoma.  There is no significant extension of abnormal hypermetabolism into the surrounding soft tissues and there is no evidence of locoregional lymphadenopathy or distant metastatic disease..    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   Meets Sedlis criteria. Recommend adjuvant EBRT.      Completed EBRT 2024    Today's visit:  Presents today for surveillance visit. Doing well and without complaints. Specifically denies changes In her bowel or bladder habits, N/V, pain, or vaginal bleeding.    CT CAP 2024 obtained by Dr. Ernst shows no obvious evidence of disease.     Disease free interval 9 months.      ____________________________  Referred by Dr. Fragoso for newly diagnosed squamous cell carcinoma of the cervix.      Pap smear from 2023 noted atypical squamous cells can not exclude a high-grade intraepithelial lesion. HPV was positive for high-risk type 16. Negative for type 18.      Colposcopic biopsies 10/4/2023  Final Pathologic Diagnosis 1.  Endocervical curettage: Fragments of ectocervix with severe squamous dysplasia (GIA 3/HSIL) admixed with scant benign endocervix   2. Cervix at 5, biopsy:     Severe squamous dysplasia (GIA 3/HSIL)       Underwent LEEP 2023   1. CERVIX, CONIZATION:   - INVASIVE, MODERATE TO POORLY DIFFERENTIATED SQUAMOUS CELL CARCINOMA   - MARGINS POSITIVE FOR CARCINOMA     2. ENDOCERVIX, CURETTAGE:   - FRAGMENTS OF SQUAMOUS CELL CARCINOMA      Prior abdominal surgeries include open appendectomy as a child.  x 3.   Review of Systems    Constitutional:  Negative for appetite change, chills, fatigue and fever.   HENT:  Negative for mouth sores.    Respiratory:  Negative for cough and shortness of breath.    Cardiovascular:  Negative for leg swelling.   Gastrointestinal:  Negative for abdominal pain, blood in stool, constipation and diarrhea.   Endocrine: Negative for cold intolerance.   Genitourinary:  Negative for dysuria and vaginal bleeding.   Musculoskeletal:  Negative for myalgias.   Skin:  Negative for rash.   Allergic/Immunologic: Negative.    Neurological:  Negative for weakness and numbness.   Hematological:  Negative for adenopathy. Does not bruise/bleed easily.   Psychiatric/Behavioral:  Negative for confusion.        Objective:   Physical Exam:   Constitutional: She is oriented to person, place, and time. She appears well-developed and well-nourished.    HENT:   Head: Normocephalic and atraumatic.    Eyes: Pupils are equal, round, and reactive to light. EOM are normal.    Neck: No thyromegaly present.    Cardiovascular:  Normal rate, regular rhythm and intact distal pulses.             Pulmonary/Chest: Effort normal and breath sounds normal. No respiratory distress. She has no wheezes.        Abdominal: Soft. Bowel sounds are normal. She exhibits no distension and no mass. There is no abdominal tenderness.     Genitourinary:    Vagina normal.      Pelvic exam was performed with patient supine.   There is no lesion on the right labia. There is no lesion on the left labia. Right adnexum displays no mass. Left adnexum displays no mass. Vagina exhibits no lesion. No bleeding in the vagina. Cervix is absent.Uterus is absent.           Musculoskeletal: Normal range of motion and moves all extremeties.      Lymphadenopathy:     She has no cervical adenopathy. No inguinal adenopathy noted on the right or left side.        Right: No supraclavicular adenopathy present.        Left: No supraclavicular adenopathy present.    Neurological: She is  alert and oriented to person, place, and time.    Skin: Skin is warm and dry. No rash noted.    Psychiatric: She has a normal mood and affect.       Assessment:     1. Malignant neoplasm of overlapping sites of cervix    2. Encounter for follow-up surveillance of cervical cancer          Plan:     No orders of the defined types were placed in this encounter.    No evidence of disease on today's exam  Feels well, no new symptoms  Disease free interval 9 months.     RTC 3 months or sooner if needed.     I spent approximately 30 minutes reviewing the available records and evaluating the patient, out of which over 50% of the time was spent face to face with the patient in counseling and coordinating this patient's care.

## 2025-04-04 DIAGNOSIS — M62.838 MUSCLE SPASMS OF NECK: ICD-10-CM

## 2025-04-04 RX ORDER — CYCLOBENZAPRINE HCL 10 MG
10 TABLET ORAL NIGHTLY
Qty: 90 TABLET | Refills: 0 | Status: SHIPPED | OUTPATIENT
Start: 2025-04-04

## 2025-04-04 NOTE — TELEPHONE ENCOUNTER
No care due was identified.  [facility] Embedded Care Due Messages. Reference number: [id number].   4/04/2025 7:05:58 AM CDT

## 2025-04-25 ENCOUNTER — PATIENT MESSAGE (OUTPATIENT)
Dept: ADMINISTRATIVE | Facility: HOSPITAL | Age: 65
End: 2025-04-25
Payer: MEDICARE

## 2025-05-11 DIAGNOSIS — G89.29 CHRONIC BILATERAL BACK PAIN, UNSPECIFIED BACK LOCATION: ICD-10-CM

## 2025-05-11 DIAGNOSIS — M54.9 CHRONIC BILATERAL BACK PAIN, UNSPECIFIED BACK LOCATION: ICD-10-CM

## 2025-05-11 RX ORDER — ROSUVASTATIN CALCIUM 20 MG/1
20 TABLET, COATED ORAL NIGHTLY
Qty: 90 TABLET | Refills: 1 | Status: SHIPPED | OUTPATIENT
Start: 2025-05-11

## 2025-05-11 NOTE — TELEPHONE ENCOUNTER
Refill Decision Note   Regina Kevin  is requesting a refill authorization.  Brief Assessment and Rationale for Refill:  Approve     Medication Therapy Plan:        Comments:     Note composed:8:08 AM 05/11/2025

## 2025-05-11 NOTE — TELEPHONE ENCOUNTER
No care due was identified.  Health system Embedded Care Due Messages. Reference number: [id number].   5/11/2025 8:05:07 AM CDT

## 2025-05-30 ENCOUNTER — PATIENT MESSAGE (OUTPATIENT)
Dept: FAMILY MEDICINE | Facility: CLINIC | Age: 65
End: 2025-05-30
Payer: COMMERCIAL

## 2025-06-12 ENCOUNTER — HOSPITAL ENCOUNTER (OUTPATIENT)
Dept: RADIOLOGY | Facility: HOSPITAL | Age: 65
Discharge: HOME | End: 2025-06-12
Attending: FAMILY MEDICINE
Payer: MEDICARE

## 2025-06-12 ENCOUNTER — RESULTS FOLLOW-UP (OUTPATIENT)
Dept: FAMILY MEDICINE | Facility: CLINIC | Age: 65
End: 2025-06-12
Payer: COMMERCIAL

## 2025-06-12 DIAGNOSIS — N63.14 MASS OF LOWER INNER QUADRANT OF RIGHT BREAST: ICD-10-CM

## 2025-06-12 DIAGNOSIS — N63.10 MASS OF RIGHT BREAST, UNSPECIFIED QUADRANT: Primary | ICD-10-CM

## 2025-06-12 PROCEDURE — 77065 DX MAMMO INCL CAD UNI: CPT | Mod: 26,RT,, | Performed by: RADIOLOGY

## 2025-06-12 PROCEDURE — 77061 BREAST TOMOSYNTHESIS UNI: CPT | Mod: 26,RT,, | Performed by: RADIOLOGY

## 2025-06-12 PROCEDURE — 76642 ULTRASOUND BREAST LIMITED: CPT | Mod: TC,RT

## 2025-06-12 PROCEDURE — 76642 ULTRASOUND BREAST LIMITED: CPT | Mod: 26,RT,, | Performed by: RADIOLOGY

## 2025-06-12 PROCEDURE — 77061 BREAST TOMOSYNTHESIS UNI: CPT | Mod: TC,RT

## 2025-06-13 ENCOUNTER — PATIENT MESSAGE (OUTPATIENT)
Dept: ADMINISTRATIVE | Facility: HOSPITAL | Age: 65
End: 2025-06-13
Payer: COMMERCIAL

## 2025-06-20 ENCOUNTER — HOSPITAL ENCOUNTER (OUTPATIENT)
Dept: RADIOLOGY | Facility: HOSPITAL | Age: 65
Discharge: HOME | End: 2025-06-20
Attending: FAMILY MEDICINE
Payer: MEDICARE

## 2025-06-20 ENCOUNTER — RESULTS FOLLOW-UP (OUTPATIENT)
Dept: FAMILY MEDICINE | Facility: CLINIC | Age: 65
End: 2025-06-20
Payer: COMMERCIAL

## 2025-06-20 DIAGNOSIS — N63.10 MASS OF RIGHT BREAST, UNSPECIFIED QUADRANT: ICD-10-CM

## 2025-06-20 PROCEDURE — 88305 TISSUE EXAM BY PATHOLOGIST: CPT | Mod: TC | Performed by: PATHOLOGY

## 2025-06-20 PROCEDURE — 77065 DX MAMMO INCL CAD UNI: CPT | Mod: 26,RT,, | Performed by: RADIOLOGY

## 2025-06-20 PROCEDURE — 19083 BX BREAST 1ST LESION US IMAG: CPT | Mod: RT

## 2025-06-20 PROCEDURE — 63600175 PHARM REV CODE 636 W HCPCS

## 2025-06-20 PROCEDURE — 77061 BREAST TOMOSYNTHESIS UNI: CPT | Mod: 26,RT,, | Performed by: RADIOLOGY

## 2025-06-20 PROCEDURE — 19083 BX BREAST 1ST LESION US IMAG: CPT | Mod: RT,,, | Performed by: RADIOLOGY

## 2025-06-20 PROCEDURE — 77065 DX MAMMO INCL CAD UNI: CPT | Mod: TC,RT

## 2025-06-20 RX ADMIN — LIDOCAINE HYDROCHLORIDE 30 ML: 10; .005 INJECTION, SOLUTION EPIDURAL; INFILTRATION; INTRACAUDAL; PERINEURAL at 03:06

## 2025-06-24 ENCOUNTER — TELEPHONE (OUTPATIENT)
Facility: CLINIC | Age: 65
End: 2025-06-24
Payer: COMMERCIAL

## 2025-06-24 NOTE — TELEPHONE ENCOUNTER
RIGHT BREAST, 6:00, 2 CM FROM NIPPLE, ULTRASOUND GUIDED CORE NEEDLE    BIOPSY:     --FIBROADENOMA.    Spoke with pt. She verbalized understanding.

## 2025-07-05 DIAGNOSIS — M62.838 MUSCLE SPASMS OF NECK: ICD-10-CM

## 2025-07-05 NOTE — TELEPHONE ENCOUNTER
Care Due:                  Date            Visit Type   Department     Provider  --------------------------------------------------------------------------------                                [place] -                              [place]      [facility] [place]  Last Visit: 10-      [facility]  Next Visit: None Scheduled  None         None Found                                                            Last  Test          Frequency    Reason                     Performed    Due Date  --------------------------------------------------------------------------------    CMP.........  12 months..  ergocalciferol,            09- 09-                             rosuvastatin.............    Lipid Panel.  12 months..  rosuvastatin.............  09- 09-    Vitamin D...  12 months..  ergocalciferol...........  09- 09-    [employer] Embedded Care Due Messages. Reference number: [id number].   7/05/2025 7:05:40 AM CDT

## 2025-07-07 RX ORDER — CYCLOBENZAPRINE HCL 10 MG
10 TABLET ORAL NIGHTLY
Qty: 90 TABLET | Refills: 0 | Status: SHIPPED | OUTPATIENT
Start: 2025-07-07

## 2025-07-09 ENCOUNTER — PATIENT MESSAGE (OUTPATIENT)
Dept: ADMINISTRATIVE | Facility: HOSPITAL | Age: 65
End: 2025-07-09
Payer: COMMERCIAL

## 2025-07-24 ENCOUNTER — PATIENT MESSAGE (OUTPATIENT)
Dept: ADMINISTRATIVE | Facility: HOSPITAL | Age: 65
End: 2025-07-24
Payer: COMMERCIAL

## 2025-08-19 ENCOUNTER — PATIENT MESSAGE (OUTPATIENT)
Dept: ADMINISTRATIVE | Facility: HOSPITAL | Age: 65
End: 2025-08-19
Payer: MEDICARE

## (undated) DEVICE — BNDG COFLEX FOAM LF2 ST 3X5YD

## (undated) DEVICE — TRAY SKIN SCRUB WET PREMIUM

## (undated) DEVICE — PACK SPY-PHI DRUG DRAPE

## (undated) DEVICE — SUT MCRYL PLUS 4-0 PS2 27IN

## (undated) DEVICE — ELECTRODE LOOP 20MMX12MM

## (undated) DEVICE — JELLY KY LUBRICATING 5G PACKET

## (undated) DEVICE — YANKAUER OPEN TIP W/O VENT

## (undated) DEVICE — SEALER LIGASURE IMPACT 18CM

## (undated) DEVICE — SYR 10CC LUER LOCK

## (undated) DEVICE — SUT CTD VICRYL 0 VIL BR/CT

## (undated) DEVICE — CATH RED RUBBER LATEX 14F 16IN

## (undated) DEVICE — BANDAGE ESMARK ELASTIC ST 4X9

## (undated) DEVICE — DRESSING XEROFORM NONADH 1X8IN

## (undated) DEVICE — BLADE SURG #15 CARBON STEEL

## (undated) DEVICE — SUT 0 54IN COATED VICRYL U

## (undated) DEVICE — SYS LABEL CORRECT MED

## (undated) DEVICE — DRAPE UNDERBUTTOCKS PCH STRL

## (undated) DEVICE — GOWN SURGICAL X-LARGE

## (undated) DEVICE — DRAPE THREE-QTR REINF 53X77IN

## (undated) DEVICE — DRAPE HAND STERILE

## (undated) DEVICE — TIP YANKAUERS BULB NO VENT

## (undated) DEVICE — GLOVE SENSICARE PI ALOE 7.5

## (undated) DEVICE — PAD CURAD NONADH 3X4IN

## (undated) DEVICE — DRAPE U SPLIT SHEET 54X76IN

## (undated) DEVICE — SUT 4/0 18IN ETHILON BL P3

## (undated) DEVICE — PACK SET UP 190 OMC-NS

## (undated) DEVICE — APPLICATOR CHLORAPREP ORN 26ML

## (undated) DEVICE — SPONGE DERMACEA 4X4IN 12PLY

## (undated) DEVICE — TUBE CONNECTING 3/16INX6FT

## (undated) DEVICE — NDL SAFETY 21G X 1 1/2 ECLPSE

## (undated) DEVICE — COVER SURG LIGHT HANDLE

## (undated) DEVICE — TRAY CATH FOL SIL URIMTR 16FR

## (undated) DEVICE — NDL 20GX1-1/2IN IB

## (undated) DEVICE — DRAPE CORETEMP FLD WRM 56X62IN

## (undated) DEVICE — DRAPE STERI INSTRUMENT 1018

## (undated) DEVICE — ELECTRODE EXTENDED BLADE

## (undated) DEVICE — SOL 9P NACL IRR PIC IL

## (undated) DEVICE — DRESSING ABSRBNT ISLAND 3.6X8

## (undated) DEVICE — SEE MEDLINE ITEM 156902

## (undated) DEVICE — SUT 1 48IN PDS II VIO MONO

## (undated) DEVICE — CONTAINER SPECIMEN STRL 4OZ

## (undated) DEVICE — DRAPE LAP T SHT W/ INSTR PAD

## (undated) DEVICE — SYR 30CC LUER LOCK

## (undated) DEVICE — LEGGING CLEAR POLY 2/PACK

## (undated) DEVICE — TOWEL OR DISP STRL BLUE 4/PK

## (undated) DEVICE — CAUTERY BOVIE PENCIL

## (undated) DEVICE — GOWN SMARTSLEEVE AAMI LVL4 XL

## (undated) DEVICE — ELECTRODE REM PLYHSV RETURN 9

## (undated) DEVICE — Device

## (undated) DEVICE — DRAPE STERI-DRAPE 1000 17X11IN

## (undated) DEVICE — TRAY DRY SPONGE SCRUB W FOAM

## (undated) DEVICE — PAD CAST 2 IN X 4YDS STERILE

## (undated) DEVICE — PACK SIRUS BASIC V SURG STRL

## (undated) DEVICE — CLIPPER BLADE MOD 4406 (CAREF)

## (undated) DEVICE — IMMOBILIZER HAND

## (undated) DEVICE — SPONGE GAUZE 16PLY 4X4

## (undated) DEVICE — UNDERPAD ULTRASORB 300LB 30X36

## (undated) DEVICE — DRAPE UINDERBUT GRAD PCH

## (undated) DEVICE — UNDERGLOVES BIOGEL PI SIZE 8

## (undated) DEVICE — NDL 22GA X1 1/2 REG BEVEL

## (undated) DEVICE — SUT 0 18IN COATED VICRYL V

## (undated) DEVICE — GLOVE SURG ULTRA TOUCH 7.5

## (undated) DEVICE — SPONGE LAP 18X18 PREWASHED

## (undated) DEVICE — STRAP OR TABLE 5IN X 72IN

## (undated) DEVICE — TOURNIQUET SB QC DP 18X4IN

## (undated) DEVICE — CORD BIPOLAR 12 FOOT

## (undated) DEVICE — BANDAGE MATRIX HK LOOP 2IN 5YD

## (undated) DEVICE — ELECTRODE BALL DISP 5MM

## (undated) DEVICE — LUBRICANT SURGILUBE 2 OZ

## (undated) DEVICE — GOWN POLY REINF BRTH SLV XL

## (undated) DEVICE — SYR LUER LOCK STERILE 10ML

## (undated) DEVICE — UNDERGLOVES BIOGEL PI SZ 7 LF

## (undated) DEVICE — CLIP LIGATION MEDIUM CLIPS 1

## (undated) DEVICE — GLOVE SURGEONS ULTRA TOUCH 6.5

## (undated) DEVICE — SUT CTD VICRYL 0 UND BR CT

## (undated) DEVICE — LOOP VESSEL YELLOW MAXI